# Patient Record
Sex: MALE | Race: WHITE | NOT HISPANIC OR LATINO | URBAN - METROPOLITAN AREA
[De-identification: names, ages, dates, MRNs, and addresses within clinical notes are randomized per-mention and may not be internally consistent; named-entity substitution may affect disease eponyms.]

---

## 2020-01-01 ENCOUNTER — INPATIENT (INPATIENT)
Facility: HOSPITAL | Age: 80
LOS: 17 days | End: 2020-08-22
Attending: INTERNAL MEDICINE | Admitting: INTERNAL MEDICINE
Payer: MEDICARE

## 2020-01-01 VITALS
RESPIRATION RATE: 40 BRPM | WEIGHT: 164.91 LBS | HEART RATE: 115 BPM | SYSTOLIC BLOOD PRESSURE: 128 MMHG | TEMPERATURE: 103 F | OXYGEN SATURATION: 97 % | DIASTOLIC BLOOD PRESSURE: 69 MMHG

## 2020-01-01 VITALS — OXYGEN SATURATION: 100 %

## 2020-01-01 DIAGNOSIS — Z95.0 PRESENCE OF CARDIAC PACEMAKER: Chronic | ICD-10-CM

## 2020-01-01 LAB
A1C WITH ESTIMATED AVERAGE GLUCOSE RESULT: 6.6 % — HIGH (ref 4–5.6)
ABO RH CONFIRMATION: SIGNIFICANT CHANGE UP
ALBUMIN SERPL ELPH-MCNC: 2.6 G/DL — LOW (ref 3.5–5.2)
ALBUMIN SERPL ELPH-MCNC: 2.7 G/DL — LOW (ref 3.5–5.2)
ALBUMIN SERPL ELPH-MCNC: 2.7 G/DL — LOW (ref 3.5–5.2)
ALBUMIN SERPL ELPH-MCNC: 2.8 G/DL — LOW (ref 3.5–5.2)
ALBUMIN SERPL ELPH-MCNC: 2.8 G/DL — LOW (ref 3.5–5.2)
ALBUMIN SERPL ELPH-MCNC: 2.9 G/DL — LOW (ref 3.5–5.2)
ALBUMIN SERPL ELPH-MCNC: 2.9 G/DL — LOW (ref 3.5–5.2)
ALBUMIN SERPL ELPH-MCNC: 3 G/DL — LOW (ref 3.5–5.2)
ALBUMIN SERPL ELPH-MCNC: 3.1 G/DL — LOW (ref 3.5–5.2)
ALBUMIN SERPL ELPH-MCNC: 3.2 G/DL — LOW (ref 3.5–5.2)
ALBUMIN SERPL ELPH-MCNC: 3.3 G/DL — LOW (ref 3.5–5.2)
ALBUMIN SERPL ELPH-MCNC: 3.4 G/DL — LOW (ref 3.5–5.2)
ALP SERPL-CCNC: 57 U/L — SIGNIFICANT CHANGE UP (ref 30–115)
ALP SERPL-CCNC: 60 U/L — SIGNIFICANT CHANGE UP (ref 30–115)
ALP SERPL-CCNC: 61 U/L — SIGNIFICANT CHANGE UP (ref 30–115)
ALP SERPL-CCNC: 62 U/L — SIGNIFICANT CHANGE UP (ref 30–115)
ALP SERPL-CCNC: 63 U/L — SIGNIFICANT CHANGE UP (ref 30–115)
ALP SERPL-CCNC: 65 U/L — SIGNIFICANT CHANGE UP (ref 30–115)
ALP SERPL-CCNC: 67 U/L — SIGNIFICANT CHANGE UP (ref 30–115)
ALP SERPL-CCNC: 68 U/L — SIGNIFICANT CHANGE UP (ref 30–115)
ALP SERPL-CCNC: 68 U/L — SIGNIFICANT CHANGE UP (ref 30–115)
ALP SERPL-CCNC: 71 U/L — SIGNIFICANT CHANGE UP (ref 30–115)
ALP SERPL-CCNC: 71 U/L — SIGNIFICANT CHANGE UP (ref 30–115)
ALP SERPL-CCNC: 72 U/L — SIGNIFICANT CHANGE UP (ref 30–115)
ALP SERPL-CCNC: 72 U/L — SIGNIFICANT CHANGE UP (ref 30–115)
ALP SERPL-CCNC: 73 U/L — SIGNIFICANT CHANGE UP (ref 30–115)
ALP SERPL-CCNC: 74 U/L — SIGNIFICANT CHANGE UP (ref 30–115)
ALP SERPL-CCNC: 74 U/L — SIGNIFICANT CHANGE UP (ref 30–115)
ALP SERPL-CCNC: 75 U/L — SIGNIFICANT CHANGE UP (ref 30–115)
ALP SERPL-CCNC: 76 U/L — SIGNIFICANT CHANGE UP (ref 30–115)
ALP SERPL-CCNC: 76 U/L — SIGNIFICANT CHANGE UP (ref 30–115)
ALP SERPL-CCNC: 78 U/L — SIGNIFICANT CHANGE UP (ref 30–115)
ALP SERPL-CCNC: 78 U/L — SIGNIFICANT CHANGE UP (ref 30–115)
ALP SERPL-CCNC: 79 U/L — SIGNIFICANT CHANGE UP (ref 30–115)
ALP SERPL-CCNC: 79 U/L — SIGNIFICANT CHANGE UP (ref 30–115)
ALP SERPL-CCNC: 80 U/L — SIGNIFICANT CHANGE UP (ref 30–115)
ALP SERPL-CCNC: 81 U/L — SIGNIFICANT CHANGE UP (ref 30–115)
ALP SERPL-CCNC: 89 U/L — SIGNIFICANT CHANGE UP (ref 30–115)
ALT FLD-CCNC: 16 U/L — SIGNIFICANT CHANGE UP (ref 0–41)
ALT FLD-CCNC: 18 U/L — SIGNIFICANT CHANGE UP (ref 0–41)
ALT FLD-CCNC: 19 U/L — SIGNIFICANT CHANGE UP (ref 0–41)
ALT FLD-CCNC: 21 U/L — SIGNIFICANT CHANGE UP (ref 0–41)
ALT FLD-CCNC: 23 U/L — SIGNIFICANT CHANGE UP (ref 0–41)
ALT FLD-CCNC: 25 U/L — SIGNIFICANT CHANGE UP (ref 0–41)
ALT FLD-CCNC: 28 U/L — SIGNIFICANT CHANGE UP (ref 0–41)
ALT FLD-CCNC: 31 U/L — SIGNIFICANT CHANGE UP (ref 0–41)
ALT FLD-CCNC: 31 U/L — SIGNIFICANT CHANGE UP (ref 0–41)
ALT FLD-CCNC: 32 U/L — SIGNIFICANT CHANGE UP (ref 0–41)
ALT FLD-CCNC: 32 U/L — SIGNIFICANT CHANGE UP (ref 0–41)
ALT FLD-CCNC: 33 U/L — SIGNIFICANT CHANGE UP (ref 0–41)
ALT FLD-CCNC: 34 U/L — SIGNIFICANT CHANGE UP (ref 0–41)
ALT FLD-CCNC: 38 U/L — SIGNIFICANT CHANGE UP (ref 0–41)
ALT FLD-CCNC: 38 U/L — SIGNIFICANT CHANGE UP (ref 0–41)
ALT FLD-CCNC: 41 U/L — SIGNIFICANT CHANGE UP (ref 0–41)
ALT FLD-CCNC: 41 U/L — SIGNIFICANT CHANGE UP (ref 0–41)
ALT FLD-CCNC: 42 U/L — HIGH (ref 0–41)
ALT FLD-CCNC: 43 U/L — HIGH (ref 0–41)
ALT FLD-CCNC: 45 U/L — HIGH (ref 0–41)
ALT FLD-CCNC: 47 U/L — HIGH (ref 0–41)
ALT FLD-CCNC: 49 U/L — HIGH (ref 0–41)
ALT FLD-CCNC: 4935 U/L — HIGH (ref 0–41)
ALT FLD-CCNC: 50 U/L — HIGH (ref 0–41)
ALT FLD-CCNC: 50 U/L — HIGH (ref 0–41)
ALT FLD-CCNC: 51 U/L — HIGH (ref 0–41)
ANION GAP SERPL CALC-SCNC: 10 MMOL/L — SIGNIFICANT CHANGE UP (ref 7–14)
ANION GAP SERPL CALC-SCNC: 11 MMOL/L — SIGNIFICANT CHANGE UP (ref 7–14)
ANION GAP SERPL CALC-SCNC: 12 MMOL/L — SIGNIFICANT CHANGE UP (ref 7–14)
ANION GAP SERPL CALC-SCNC: 13 MMOL/L — SIGNIFICANT CHANGE UP (ref 7–14)
ANION GAP SERPL CALC-SCNC: 14 MMOL/L — SIGNIFICANT CHANGE UP (ref 7–14)
ANION GAP SERPL CALC-SCNC: 16 MMOL/L — HIGH (ref 7–14)
ANION GAP SERPL CALC-SCNC: 27 MMOL/L — HIGH (ref 7–14)
ANION GAP SERPL CALC-SCNC: 4 MMOL/L — LOW (ref 7–14)
ANION GAP SERPL CALC-SCNC: 6 MMOL/L — LOW (ref 7–14)
ANION GAP SERPL CALC-SCNC: 8 MMOL/L — SIGNIFICANT CHANGE UP (ref 7–14)
ANISOCYTOSIS BLD QL: SLIGHT — SIGNIFICANT CHANGE UP
APTT BLD: 36.8 SEC — SIGNIFICANT CHANGE UP (ref 27–39.2)
APTT BLD: 47.6 SEC — HIGH (ref 27–39.2)
AST SERPL-CCNC: 14 U/L — SIGNIFICANT CHANGE UP (ref 0–41)
AST SERPL-CCNC: 15 U/L — SIGNIFICANT CHANGE UP (ref 0–41)
AST SERPL-CCNC: 16 U/L — SIGNIFICANT CHANGE UP (ref 0–41)
AST SERPL-CCNC: 19 U/L — SIGNIFICANT CHANGE UP (ref 0–41)
AST SERPL-CCNC: 20 U/L — SIGNIFICANT CHANGE UP (ref 0–41)
AST SERPL-CCNC: 21 U/L — SIGNIFICANT CHANGE UP (ref 0–41)
AST SERPL-CCNC: 21 U/L — SIGNIFICANT CHANGE UP (ref 0–41)
AST SERPL-CCNC: 24 U/L — SIGNIFICANT CHANGE UP (ref 0–41)
AST SERPL-CCNC: 25 U/L — SIGNIFICANT CHANGE UP (ref 0–41)
AST SERPL-CCNC: 27 U/L — SIGNIFICANT CHANGE UP (ref 0–41)
AST SERPL-CCNC: 28 U/L — SIGNIFICANT CHANGE UP (ref 0–41)
AST SERPL-CCNC: 29 U/L — SIGNIFICANT CHANGE UP (ref 0–41)
AST SERPL-CCNC: 31 U/L — SIGNIFICANT CHANGE UP (ref 0–41)
AST SERPL-CCNC: 33 U/L — SIGNIFICANT CHANGE UP (ref 0–41)
AST SERPL-CCNC: 34 U/L — SIGNIFICANT CHANGE UP (ref 0–41)
AST SERPL-CCNC: 3556 U/L — HIGH (ref 0–41)
AST SERPL-CCNC: 49 U/L — HIGH (ref 0–41)
AST SERPL-CCNC: 49 U/L — HIGH (ref 0–41)
AST SERPL-CCNC: 51 U/L — HIGH (ref 0–41)
AST SERPL-CCNC: 54 U/L — HIGH (ref 0–41)
AST SERPL-CCNC: 54 U/L — HIGH (ref 0–41)
BASOPHILS # BLD AUTO: 0.02 K/UL — SIGNIFICANT CHANGE UP (ref 0–0.2)
BASOPHILS # BLD AUTO: 0.03 K/UL — SIGNIFICANT CHANGE UP (ref 0–0.2)
BASOPHILS # BLD AUTO: 0.04 K/UL — SIGNIFICANT CHANGE UP (ref 0–0.2)
BASOPHILS # BLD AUTO: 0.04 K/UL — SIGNIFICANT CHANGE UP (ref 0–0.2)
BASOPHILS # BLD AUTO: 0.08 K/UL — SIGNIFICANT CHANGE UP (ref 0–0.2)
BASOPHILS # BLD AUTO: 0.2 K/UL — SIGNIFICANT CHANGE UP (ref 0–0.2)
BASOPHILS # BLD AUTO: 0.31 K/UL — HIGH (ref 0–0.2)
BASOPHILS NFR BLD AUTO: 0.1 % — SIGNIFICANT CHANGE UP (ref 0–1)
BASOPHILS NFR BLD AUTO: 0.1 % — SIGNIFICANT CHANGE UP (ref 0–1)
BASOPHILS NFR BLD AUTO: 0.2 % — SIGNIFICANT CHANGE UP (ref 0–1)
BASOPHILS NFR BLD AUTO: 0.3 % — SIGNIFICANT CHANGE UP (ref 0–1)
BASOPHILS NFR BLD AUTO: 0.3 % — SIGNIFICANT CHANGE UP (ref 0–1)
BASOPHILS NFR BLD AUTO: 0.5 % — SIGNIFICANT CHANGE UP (ref 0–1)
BASOPHILS NFR BLD AUTO: 0.9 % — SIGNIFICANT CHANGE UP (ref 0–1)
BASOPHILS NFR BLD AUTO: 1.7 % — HIGH (ref 0–1)
BILIRUB DIRECT SERPL-MCNC: 0.2 MG/DL — SIGNIFICANT CHANGE UP (ref 0–0.2)
BILIRUB INDIRECT FLD-MCNC: 0.1 MG/DL — LOW (ref 0.2–1.2)
BILIRUB INDIRECT FLD-MCNC: 0.2 MG/DL — SIGNIFICANT CHANGE UP (ref 0.2–1.2)
BILIRUB INDIRECT FLD-MCNC: 0.2 MG/DL — SIGNIFICANT CHANGE UP (ref 0.2–1.2)
BILIRUB INDIRECT FLD-MCNC: 0.3 MG/DL — SIGNIFICANT CHANGE UP (ref 0.2–1.2)
BILIRUB INDIRECT FLD-MCNC: 0.4 MG/DL — SIGNIFICANT CHANGE UP (ref 0.2–1.2)
BILIRUB INDIRECT FLD-MCNC: 0.6 MG/DL — SIGNIFICANT CHANGE UP (ref 0.2–1.2)
BILIRUB INDIRECT FLD-MCNC: 0.6 MG/DL — SIGNIFICANT CHANGE UP (ref 0.2–1.2)
BILIRUB SERPL-MCNC: 0.3 MG/DL — SIGNIFICANT CHANGE UP (ref 0.2–1.2)
BILIRUB SERPL-MCNC: 0.4 MG/DL — SIGNIFICANT CHANGE UP (ref 0.2–1.2)
BILIRUB SERPL-MCNC: 0.5 MG/DL — SIGNIFICANT CHANGE UP (ref 0.2–1.2)
BILIRUB SERPL-MCNC: 0.6 MG/DL — SIGNIFICANT CHANGE UP (ref 0.2–1.2)
BILIRUB SERPL-MCNC: 0.7 MG/DL — SIGNIFICANT CHANGE UP (ref 0.2–1.2)
BILIRUB SERPL-MCNC: 0.7 MG/DL — SIGNIFICANT CHANGE UP (ref 0.2–1.2)
BILIRUB SERPL-MCNC: 0.8 MG/DL — SIGNIFICANT CHANGE UP (ref 0.2–1.2)
BILIRUB SERPL-MCNC: 0.9 MG/DL — SIGNIFICANT CHANGE UP (ref 0.2–1.2)
BILIRUB SERPL-MCNC: 1 MG/DL — SIGNIFICANT CHANGE UP (ref 0.2–1.2)
BILIRUB SERPL-MCNC: 1 MG/DL — SIGNIFICANT CHANGE UP (ref 0.2–1.2)
BILIRUB SERPL-MCNC: 1.1 MG/DL — SIGNIFICANT CHANGE UP (ref 0.2–1.2)
BILIRUB SERPL-MCNC: 1.3 MG/DL — HIGH (ref 0.2–1.2)
BLD GP AB SCN SERPL QL: SIGNIFICANT CHANGE UP
BUN SERPL-MCNC: 14 MG/DL — SIGNIFICANT CHANGE UP (ref 10–20)
BUN SERPL-MCNC: 15 MG/DL — SIGNIFICANT CHANGE UP (ref 10–20)
BUN SERPL-MCNC: 18 MG/DL — SIGNIFICANT CHANGE UP (ref 10–20)
BUN SERPL-MCNC: 21 MG/DL — HIGH (ref 10–20)
BUN SERPL-MCNC: 21 MG/DL — HIGH (ref 10–20)
BUN SERPL-MCNC: 22 MG/DL — HIGH (ref 10–20)
BUN SERPL-MCNC: 23 MG/DL — HIGH (ref 10–20)
BUN SERPL-MCNC: 24 MG/DL — HIGH (ref 10–20)
BUN SERPL-MCNC: 25 MG/DL — HIGH (ref 10–20)
BUN SERPL-MCNC: 28 MG/DL — HIGH (ref 10–20)
BUN SERPL-MCNC: 31 MG/DL — HIGH (ref 10–20)
BUN SERPL-MCNC: 32 MG/DL — HIGH (ref 10–20)
BURR CELLS BLD QL SMEAR: PRESENT — SIGNIFICANT CHANGE UP
BURR CELLS BLD QL SMEAR: PRESENT — SIGNIFICANT CHANGE UP
CALCIUM SERPL-MCNC: 7.7 MG/DL — LOW (ref 8.5–10.1)
CALCIUM SERPL-MCNC: 8.2 MG/DL — LOW (ref 8.5–10.1)
CALCIUM SERPL-MCNC: 8.4 MG/DL — LOW (ref 8.5–10.1)
CALCIUM SERPL-MCNC: 8.5 MG/DL — SIGNIFICANT CHANGE UP (ref 8.5–10.1)
CALCIUM SERPL-MCNC: 8.6 MG/DL — SIGNIFICANT CHANGE UP (ref 8.5–10.1)
CALCIUM SERPL-MCNC: 8.7 MG/DL — SIGNIFICANT CHANGE UP (ref 8.5–10.1)
CALCIUM SERPL-MCNC: 8.8 MG/DL — SIGNIFICANT CHANGE UP (ref 8.5–10.1)
CHLORIDE SERPL-SCNC: 101 MMOL/L — SIGNIFICANT CHANGE UP (ref 98–110)
CHLORIDE SERPL-SCNC: 102 MMOL/L — SIGNIFICANT CHANGE UP (ref 98–110)
CHLORIDE SERPL-SCNC: 104 MMOL/L — SIGNIFICANT CHANGE UP (ref 98–110)
CHLORIDE SERPL-SCNC: 104 MMOL/L — SIGNIFICANT CHANGE UP (ref 98–110)
CHLORIDE SERPL-SCNC: 105 MMOL/L — SIGNIFICANT CHANGE UP (ref 98–110)
CHLORIDE SERPL-SCNC: 106 MMOL/L — SIGNIFICANT CHANGE UP (ref 98–110)
CHLORIDE SERPL-SCNC: 106 MMOL/L — SIGNIFICANT CHANGE UP (ref 98–110)
CHLORIDE SERPL-SCNC: 107 MMOL/L — SIGNIFICANT CHANGE UP (ref 98–110)
CHLORIDE SERPL-SCNC: 108 MMOL/L — SIGNIFICANT CHANGE UP (ref 98–110)
CHLORIDE SERPL-SCNC: 108 MMOL/L — SIGNIFICANT CHANGE UP (ref 98–110)
CHLORIDE SERPL-SCNC: 110 MMOL/L — SIGNIFICANT CHANGE UP (ref 98–110)
CHLORIDE SERPL-SCNC: 111 MMOL/L — HIGH (ref 98–110)
CHLORIDE SERPL-SCNC: 92 MMOL/L — LOW (ref 98–110)
CHLORIDE SERPL-SCNC: 94 MMOL/L — LOW (ref 98–110)
CHLORIDE SERPL-SCNC: 98 MMOL/L — SIGNIFICANT CHANGE UP (ref 98–110)
CHLORIDE SERPL-SCNC: 99 MMOL/L — SIGNIFICANT CHANGE UP (ref 98–110)
CHOLEST SERPL-MCNC: 149 MG/DL — SIGNIFICANT CHANGE UP (ref 100–200)
CO2 SERPL-SCNC: 11 MMOL/L — LOW (ref 17–32)
CO2 SERPL-SCNC: 17 MMOL/L — SIGNIFICANT CHANGE UP (ref 17–32)
CO2 SERPL-SCNC: 18 MMOL/L — SIGNIFICANT CHANGE UP (ref 17–32)
CO2 SERPL-SCNC: 19 MMOL/L — SIGNIFICANT CHANGE UP (ref 17–32)
CO2 SERPL-SCNC: 19 MMOL/L — SIGNIFICANT CHANGE UP (ref 17–32)
CO2 SERPL-SCNC: 20 MMOL/L — SIGNIFICANT CHANGE UP (ref 17–32)
CO2 SERPL-SCNC: 20 MMOL/L — SIGNIFICANT CHANGE UP (ref 17–32)
CO2 SERPL-SCNC: 21 MMOL/L — SIGNIFICANT CHANGE UP (ref 17–32)
CO2 SERPL-SCNC: 21 MMOL/L — SIGNIFICANT CHANGE UP (ref 17–32)
CO2 SERPL-SCNC: 22 MMOL/L — SIGNIFICANT CHANGE UP (ref 17–32)
CO2 SERPL-SCNC: 23 MMOL/L — SIGNIFICANT CHANGE UP (ref 17–32)
CO2 SERPL-SCNC: 25 MMOL/L — SIGNIFICANT CHANGE UP (ref 17–32)
CO2 SERPL-SCNC: 27 MMOL/L — SIGNIFICANT CHANGE UP (ref 17–32)
CO2 SERPL-SCNC: 28 MMOL/L — SIGNIFICANT CHANGE UP (ref 17–32)
CO2 SERPL-SCNC: 29 MMOL/L — SIGNIFICANT CHANGE UP (ref 17–32)
CREAT SERPL-MCNC: 0.8 MG/DL — SIGNIFICANT CHANGE UP (ref 0.7–1.5)
CREAT SERPL-MCNC: 0.9 MG/DL — SIGNIFICANT CHANGE UP (ref 0.7–1.5)
CREAT SERPL-MCNC: 1 MG/DL — SIGNIFICANT CHANGE UP (ref 0.7–1.5)
CREAT SERPL-MCNC: 1.1 MG/DL — SIGNIFICANT CHANGE UP (ref 0.7–1.5)
CREAT SERPL-MCNC: 2.4 MG/DL — HIGH (ref 0.7–1.5)
CRP SERPL-MCNC: 0.11 MG/DL — SIGNIFICANT CHANGE UP (ref 0–0.4)
CRP SERPL-MCNC: 0.31 MG/DL — SIGNIFICANT CHANGE UP (ref 0–0.4)
CRP SERPL-MCNC: 0.4 MG/DL — SIGNIFICANT CHANGE UP (ref 0–0.4)
CRP SERPL-MCNC: 0.93 MG/DL — HIGH (ref 0–0.4)
CRP SERPL-MCNC: 19.62 MG/DL — HIGH (ref 0–0.4)
CRP SERPL-MCNC: 21.96 MG/DL — HIGH (ref 0–0.4)
CRP SERPL-MCNC: 23.41 MG/DL — HIGH (ref 0–0.4)
CULTURE RESULTS: SIGNIFICANT CHANGE UP
CULTURE RESULTS: SIGNIFICANT CHANGE UP
D DIMER BLD IA.RAPID-MCNC: 1132 NG/ML DDU — HIGH (ref 0–230)
D DIMER BLD IA.RAPID-MCNC: 1960 NG/ML DDU — HIGH (ref 0–230)
D DIMER BLD IA.RAPID-MCNC: 3614 NG/ML DDU — HIGH (ref 0–230)
D DIMER BLD IA.RAPID-MCNC: 374 NG/ML DDU — HIGH (ref 0–230)
D DIMER BLD IA.RAPID-MCNC: 414 NG/ML DDU — HIGH (ref 0–230)
D DIMER BLD IA.RAPID-MCNC: 435 NG/ML DDU — HIGH (ref 0–230)
D DIMER BLD IA.RAPID-MCNC: HIGH NG/ML DDU (ref 0–230)
EOSINOPHIL # BLD AUTO: 0 K/UL — SIGNIFICANT CHANGE UP (ref 0–0.7)
EOSINOPHIL # BLD AUTO: 0.04 K/UL — SIGNIFICANT CHANGE UP (ref 0–0.7)
EOSINOPHIL # BLD AUTO: 0.08 K/UL — SIGNIFICANT CHANGE UP (ref 0–0.7)
EOSINOPHIL # BLD AUTO: 0.09 K/UL — SIGNIFICANT CHANGE UP (ref 0–0.7)
EOSINOPHIL # BLD AUTO: 0.1 K/UL — SIGNIFICANT CHANGE UP (ref 0–0.7)
EOSINOPHIL # BLD AUTO: 0.13 K/UL — SIGNIFICANT CHANGE UP (ref 0–0.7)
EOSINOPHIL # BLD AUTO: 0.2 K/UL — SIGNIFICANT CHANGE UP (ref 0–0.7)
EOSINOPHIL # BLD AUTO: 0.22 K/UL — SIGNIFICANT CHANGE UP (ref 0–0.7)
EOSINOPHIL # BLD AUTO: 0.39 K/UL — SIGNIFICANT CHANGE UP (ref 0–0.7)
EOSINOPHIL # BLD AUTO: 0.46 K/UL — SIGNIFICANT CHANGE UP (ref 0–0.7)
EOSINOPHIL NFR BLD AUTO: 0 % — SIGNIFICANT CHANGE UP (ref 0–8)
EOSINOPHIL NFR BLD AUTO: 0.2 % — SIGNIFICANT CHANGE UP (ref 0–8)
EOSINOPHIL NFR BLD AUTO: 0.6 % — SIGNIFICANT CHANGE UP (ref 0–8)
EOSINOPHIL NFR BLD AUTO: 0.7 % — SIGNIFICANT CHANGE UP (ref 0–8)
EOSINOPHIL NFR BLD AUTO: 0.8 % — SIGNIFICANT CHANGE UP (ref 0–8)
EOSINOPHIL NFR BLD AUTO: 0.8 % — SIGNIFICANT CHANGE UP (ref 0–8)
EOSINOPHIL NFR BLD AUTO: 1.1 % — SIGNIFICANT CHANGE UP (ref 0–8)
EOSINOPHIL NFR BLD AUTO: 1.2 % — SIGNIFICANT CHANGE UP (ref 0–8)
EOSINOPHIL NFR BLD AUTO: 3.6 % — SIGNIFICANT CHANGE UP (ref 0–8)
EOSINOPHIL NFR BLD AUTO: 3.9 % — SIGNIFICANT CHANGE UP (ref 0–8)
ERYTHROCYTE [SEDIMENTATION RATE] IN BLOOD: 58 MM/HR — HIGH (ref 0–10)
ERYTHROCYTE [SEDIMENTATION RATE] IN BLOOD: 7 MM/HR — SIGNIFICANT CHANGE UP (ref 0–10)
ESTIMATED AVERAGE GLUCOSE: 143 MG/DL — HIGH (ref 68–114)
FERRITIN SERPL-MCNC: 1318 NG/ML — HIGH (ref 30–400)
FERRITIN SERPL-MCNC: 1570 NG/ML — HIGH (ref 30–400)
FERRITIN SERPL-MCNC: 1625 NG/ML — HIGH (ref 30–400)
FERRITIN SERPL-MCNC: 1716 NG/ML — HIGH (ref 30–400)
FIBRINOGEN PPP-MCNC: 216 MG/DL — SIGNIFICANT CHANGE UP (ref 204.4–570.6)
FIBRINOGEN PPP-MCNC: 328 MG/DL — SIGNIFICANT CHANGE UP (ref 204.4–570.6)
FIBRINOGEN PPP-MCNC: 339 MG/DL — SIGNIFICANT CHANGE UP (ref 204.4–570.6)
FIBRINOGEN PPP-MCNC: 345 MG/DL — SIGNIFICANT CHANGE UP (ref 204.4–570.6)
FIBRINOGEN PPP-MCNC: 691 MG/DL — HIGH (ref 204.4–570.6)
GAS PNL BLDA: SIGNIFICANT CHANGE UP
GIANT PLATELETS BLD QL SMEAR: PRESENT — SIGNIFICANT CHANGE UP
GIANT PLATELETS BLD QL SMEAR: PRESENT — SIGNIFICANT CHANGE UP
GLUCOSE BLDC GLUCOMTR-MCNC: 103 MG/DL — HIGH (ref 70–99)
GLUCOSE BLDC GLUCOMTR-MCNC: 105 MG/DL — HIGH (ref 70–99)
GLUCOSE BLDC GLUCOMTR-MCNC: 105 MG/DL — HIGH (ref 70–99)
GLUCOSE BLDC GLUCOMTR-MCNC: 106 MG/DL — HIGH (ref 70–99)
GLUCOSE BLDC GLUCOMTR-MCNC: 111 MG/DL — HIGH (ref 70–99)
GLUCOSE BLDC GLUCOMTR-MCNC: 111 MG/DL — HIGH (ref 70–99)
GLUCOSE BLDC GLUCOMTR-MCNC: 114 MG/DL — HIGH (ref 70–99)
GLUCOSE BLDC GLUCOMTR-MCNC: 115 MG/DL — HIGH (ref 70–99)
GLUCOSE BLDC GLUCOMTR-MCNC: 115 MG/DL — HIGH (ref 70–99)
GLUCOSE BLDC GLUCOMTR-MCNC: 116 MG/DL — HIGH (ref 70–99)
GLUCOSE BLDC GLUCOMTR-MCNC: 117 MG/DL — HIGH (ref 70–99)
GLUCOSE BLDC GLUCOMTR-MCNC: 117 MG/DL — HIGH (ref 70–99)
GLUCOSE BLDC GLUCOMTR-MCNC: 120 MG/DL — HIGH (ref 70–99)
GLUCOSE BLDC GLUCOMTR-MCNC: 121 MG/DL — HIGH (ref 70–99)
GLUCOSE BLDC GLUCOMTR-MCNC: 124 MG/DL — HIGH (ref 70–99)
GLUCOSE BLDC GLUCOMTR-MCNC: 124 MG/DL — HIGH (ref 70–99)
GLUCOSE BLDC GLUCOMTR-MCNC: 125 MG/DL — HIGH (ref 70–99)
GLUCOSE BLDC GLUCOMTR-MCNC: 127 MG/DL — HIGH (ref 70–99)
GLUCOSE BLDC GLUCOMTR-MCNC: 129 MG/DL — HIGH (ref 70–99)
GLUCOSE BLDC GLUCOMTR-MCNC: 132 MG/DL — HIGH (ref 70–99)
GLUCOSE BLDC GLUCOMTR-MCNC: 133 MG/DL — HIGH (ref 70–99)
GLUCOSE BLDC GLUCOMTR-MCNC: 135 MG/DL — HIGH (ref 70–99)
GLUCOSE BLDC GLUCOMTR-MCNC: 139 MG/DL — HIGH (ref 70–99)
GLUCOSE BLDC GLUCOMTR-MCNC: 139 MG/DL — HIGH (ref 70–99)
GLUCOSE BLDC GLUCOMTR-MCNC: 140 MG/DL — HIGH (ref 70–99)
GLUCOSE BLDC GLUCOMTR-MCNC: 140 MG/DL — HIGH (ref 70–99)
GLUCOSE BLDC GLUCOMTR-MCNC: 151 MG/DL — HIGH (ref 70–99)
GLUCOSE BLDC GLUCOMTR-MCNC: 152 MG/DL — HIGH (ref 70–99)
GLUCOSE BLDC GLUCOMTR-MCNC: 155 MG/DL — HIGH (ref 70–99)
GLUCOSE BLDC GLUCOMTR-MCNC: 159 MG/DL — HIGH (ref 70–99)
GLUCOSE BLDC GLUCOMTR-MCNC: 161 MG/DL — HIGH (ref 70–99)
GLUCOSE BLDC GLUCOMTR-MCNC: 162 MG/DL — HIGH (ref 70–99)
GLUCOSE BLDC GLUCOMTR-MCNC: 162 MG/DL — HIGH (ref 70–99)
GLUCOSE BLDC GLUCOMTR-MCNC: 164 MG/DL — HIGH (ref 70–99)
GLUCOSE BLDC GLUCOMTR-MCNC: 166 MG/DL — HIGH (ref 70–99)
GLUCOSE BLDC GLUCOMTR-MCNC: 167 MG/DL — HIGH (ref 70–99)
GLUCOSE BLDC GLUCOMTR-MCNC: 168 MG/DL — HIGH (ref 70–99)
GLUCOSE BLDC GLUCOMTR-MCNC: 172 MG/DL — HIGH (ref 70–99)
GLUCOSE BLDC GLUCOMTR-MCNC: 177 MG/DL — HIGH (ref 70–99)
GLUCOSE BLDC GLUCOMTR-MCNC: 178 MG/DL — HIGH (ref 70–99)
GLUCOSE BLDC GLUCOMTR-MCNC: 179 MG/DL — HIGH (ref 70–99)
GLUCOSE BLDC GLUCOMTR-MCNC: 184 MG/DL — HIGH (ref 70–99)
GLUCOSE BLDC GLUCOMTR-MCNC: 185 MG/DL — HIGH (ref 70–99)
GLUCOSE BLDC GLUCOMTR-MCNC: 187 MG/DL — HIGH (ref 70–99)
GLUCOSE BLDC GLUCOMTR-MCNC: 189 MG/DL — HIGH (ref 70–99)
GLUCOSE BLDC GLUCOMTR-MCNC: 191 MG/DL — HIGH (ref 70–99)
GLUCOSE BLDC GLUCOMTR-MCNC: 193 MG/DL — HIGH (ref 70–99)
GLUCOSE BLDC GLUCOMTR-MCNC: 200 MG/DL — HIGH (ref 70–99)
GLUCOSE BLDC GLUCOMTR-MCNC: 204 MG/DL — HIGH (ref 70–99)
GLUCOSE BLDC GLUCOMTR-MCNC: 206 MG/DL — HIGH (ref 70–99)
GLUCOSE BLDC GLUCOMTR-MCNC: 206 MG/DL — HIGH (ref 70–99)
GLUCOSE BLDC GLUCOMTR-MCNC: 208 MG/DL — HIGH (ref 70–99)
GLUCOSE BLDC GLUCOMTR-MCNC: 210 MG/DL — HIGH (ref 70–99)
GLUCOSE BLDC GLUCOMTR-MCNC: 215 MG/DL — HIGH (ref 70–99)
GLUCOSE BLDC GLUCOMTR-MCNC: 215 MG/DL — HIGH (ref 70–99)
GLUCOSE BLDC GLUCOMTR-MCNC: 219 MG/DL — HIGH (ref 70–99)
GLUCOSE BLDC GLUCOMTR-MCNC: 226 MG/DL — HIGH (ref 70–99)
GLUCOSE BLDC GLUCOMTR-MCNC: 229 MG/DL — HIGH (ref 70–99)
GLUCOSE BLDC GLUCOMTR-MCNC: 241 MG/DL — HIGH (ref 70–99)
GLUCOSE BLDC GLUCOMTR-MCNC: 244 MG/DL — HIGH (ref 70–99)
GLUCOSE BLDC GLUCOMTR-MCNC: 258 MG/DL — HIGH (ref 70–99)
GLUCOSE BLDC GLUCOMTR-MCNC: 287 MG/DL — HIGH (ref 70–99)
GLUCOSE BLDC GLUCOMTR-MCNC: 378 MG/DL — HIGH (ref 70–99)
GLUCOSE BLDC GLUCOMTR-MCNC: 82 MG/DL — SIGNIFICANT CHANGE UP (ref 70–99)
GLUCOSE SERPL-MCNC: 109 MG/DL — HIGH (ref 70–99)
GLUCOSE SERPL-MCNC: 113 MG/DL — HIGH (ref 70–99)
GLUCOSE SERPL-MCNC: 115 MG/DL — HIGH (ref 70–99)
GLUCOSE SERPL-MCNC: 133 MG/DL — HIGH (ref 70–99)
GLUCOSE SERPL-MCNC: 137 MG/DL — HIGH (ref 70–99)
GLUCOSE SERPL-MCNC: 139 MG/DL — HIGH (ref 70–99)
GLUCOSE SERPL-MCNC: 143 MG/DL — HIGH (ref 70–99)
GLUCOSE SERPL-MCNC: 143 MG/DL — HIGH (ref 70–99)
GLUCOSE SERPL-MCNC: 150 MG/DL — HIGH (ref 70–99)
GLUCOSE SERPL-MCNC: 154 MG/DL — HIGH (ref 70–99)
GLUCOSE SERPL-MCNC: 157 MG/DL — HIGH (ref 70–99)
GLUCOSE SERPL-MCNC: 161 MG/DL — HIGH (ref 70–99)
GLUCOSE SERPL-MCNC: 165 MG/DL — HIGH (ref 70–99)
GLUCOSE SERPL-MCNC: 186 MG/DL — HIGH (ref 70–99)
GLUCOSE SERPL-MCNC: 186 MG/DL — HIGH (ref 70–99)
GLUCOSE SERPL-MCNC: 188 MG/DL — HIGH (ref 70–99)
GLUCOSE SERPL-MCNC: 194 MG/DL — HIGH (ref 70–99)
GLUCOSE SERPL-MCNC: 223 MG/DL — HIGH (ref 70–99)
GLUCOSE SERPL-MCNC: 224 MG/DL — HIGH (ref 70–99)
GLUCOSE SERPL-MCNC: 232 MG/DL — HIGH (ref 70–99)
HCT VFR BLD CALC: 24.6 % — LOW (ref 42–52)
HCT VFR BLD CALC: 34.2 % — LOW (ref 42–52)
HCT VFR BLD CALC: 38 % — LOW (ref 42–52)
HCT VFR BLD CALC: 41.6 % — LOW (ref 42–52)
HCT VFR BLD CALC: 41.8 % — LOW (ref 42–52)
HCT VFR BLD CALC: 42.2 % — SIGNIFICANT CHANGE UP (ref 42–52)
HCT VFR BLD CALC: 42.6 % — SIGNIFICANT CHANGE UP (ref 42–52)
HCT VFR BLD CALC: 42.8 % — SIGNIFICANT CHANGE UP (ref 42–52)
HCT VFR BLD CALC: 43.1 % — SIGNIFICANT CHANGE UP (ref 42–52)
HCT VFR BLD CALC: 43.2 % — SIGNIFICANT CHANGE UP (ref 42–52)
HCT VFR BLD CALC: 43.5 % — SIGNIFICANT CHANGE UP (ref 42–52)
HCT VFR BLD CALC: 43.9 % — SIGNIFICANT CHANGE UP (ref 42–52)
HCT VFR BLD CALC: 45.4 % — SIGNIFICANT CHANGE UP (ref 42–52)
HCT VFR BLD CALC: 46 % — SIGNIFICANT CHANGE UP (ref 42–52)
HCT VFR BLD CALC: 46.5 % — SIGNIFICANT CHANGE UP (ref 42–52)
HCT VFR BLD CALC: 47.5 % — SIGNIFICANT CHANGE UP (ref 42–52)
HDLC SERPL-MCNC: 24 MG/DL — LOW
HGB BLD-MCNC: 11.6 G/DL — LOW (ref 14–18)
HGB BLD-MCNC: 12.5 G/DL — LOW (ref 14–18)
HGB BLD-MCNC: 13.3 G/DL — LOW (ref 14–18)
HGB BLD-MCNC: 13.5 G/DL — LOW (ref 14–18)
HGB BLD-MCNC: 13.9 G/DL — LOW (ref 14–18)
HGB BLD-MCNC: 14 G/DL — SIGNIFICANT CHANGE UP (ref 14–18)
HGB BLD-MCNC: 14.1 G/DL — SIGNIFICANT CHANGE UP (ref 14–18)
HGB BLD-MCNC: 14.3 G/DL — SIGNIFICANT CHANGE UP (ref 14–18)
HGB BLD-MCNC: 14.3 G/DL — SIGNIFICANT CHANGE UP (ref 14–18)
HGB BLD-MCNC: 14.7 G/DL — SIGNIFICANT CHANGE UP (ref 14–18)
HGB BLD-MCNC: 14.9 G/DL — SIGNIFICANT CHANGE UP (ref 14–18)
HGB BLD-MCNC: 15.1 G/DL — SIGNIFICANT CHANGE UP (ref 14–18)
HGB BLD-MCNC: 15.1 G/DL — SIGNIFICANT CHANGE UP (ref 14–18)
HGB BLD-MCNC: 15.2 G/DL — SIGNIFICANT CHANGE UP (ref 14–18)
HGB BLD-MCNC: 15.5 G/DL — SIGNIFICANT CHANGE UP (ref 14–18)
HGB BLD-MCNC: 8 G/DL — LOW (ref 14–18)
IMM GRANULOCYTES NFR BLD AUTO: 0.5 % — HIGH (ref 0.1–0.3)
IMM GRANULOCYTES NFR BLD AUTO: 0.6 % — HIGH (ref 0.1–0.3)
IMM GRANULOCYTES NFR BLD AUTO: 0.7 % — HIGH (ref 0.1–0.3)
IMM GRANULOCYTES NFR BLD AUTO: 0.8 % — HIGH (ref 0.1–0.3)
IMM GRANULOCYTES NFR BLD AUTO: 0.8 % — HIGH (ref 0.1–0.3)
IMM GRANULOCYTES NFR BLD AUTO: 1 % — HIGH (ref 0.1–0.3)
IMM GRANULOCYTES NFR BLD AUTO: 1.1 % — HIGH (ref 0.1–0.3)
IMM GRANULOCYTES NFR BLD AUTO: 1.5 % — HIGH (ref 0.1–0.3)
IMM GRANULOCYTES NFR BLD AUTO: 1.5 % — HIGH (ref 0.1–0.3)
IMM GRANULOCYTES NFR BLD AUTO: 1.8 % — HIGH (ref 0.1–0.3)
IMM GRANULOCYTES NFR BLD AUTO: 3.3 % — HIGH (ref 0.1–0.3)
INR BLD: 1.53 RATIO — HIGH (ref 0.65–1.3)
INR BLD: 1.86 RATIO — HIGH (ref 0.65–1.3)
LACTATE SERPL-SCNC: 17.3 MMOL/L — CRITICAL HIGH (ref 0.7–2)
LACTATE SERPL-SCNC: 2.2 MMOL/L — HIGH (ref 0.7–2)
LDH SERPL L TO P-CCNC: 337 U/L — HIGH (ref 50–242)
LDH SERPL L TO P-CCNC: 367 U/L — HIGH (ref 50–242)
LDH SERPL L TO P-CCNC: 416 U/L — HIGH (ref 50–242)
LEGIONELLA AG UR QL: NEGATIVE — SIGNIFICANT CHANGE UP
LIPID PNL WITH DIRECT LDL SERPL: 88 MG/DL — SIGNIFICANT CHANGE UP (ref 4–129)
LYMPHOCYTES # BLD AUTO: 0.74 K/UL — LOW (ref 1.2–3.4)
LYMPHOCYTES # BLD AUTO: 1.06 K/UL — LOW (ref 1.2–3.4)
LYMPHOCYTES # BLD AUTO: 1.07 K/UL — LOW (ref 1.2–3.4)
LYMPHOCYTES # BLD AUTO: 1.14 K/UL — LOW (ref 1.2–3.4)
LYMPHOCYTES # BLD AUTO: 1.16 K/UL — LOW (ref 1.2–3.4)
LYMPHOCYTES # BLD AUTO: 1.17 K/UL — LOW (ref 1.2–3.4)
LYMPHOCYTES # BLD AUTO: 1.17 K/UL — LOW (ref 1.2–3.4)
LYMPHOCYTES # BLD AUTO: 1.22 K/UL — SIGNIFICANT CHANGE UP (ref 1.2–3.4)
LYMPHOCYTES # BLD AUTO: 1.23 K/UL — SIGNIFICANT CHANGE UP (ref 1.2–3.4)
LYMPHOCYTES # BLD AUTO: 1.38 K/UL — SIGNIFICANT CHANGE UP (ref 1.2–3.4)
LYMPHOCYTES # BLD AUTO: 1.44 K/UL — SIGNIFICANT CHANGE UP (ref 1.2–3.4)
LYMPHOCYTES # BLD AUTO: 10.5 % — LOW (ref 20.5–51.1)
LYMPHOCYTES # BLD AUTO: 10.8 % — LOW (ref 20.5–51.1)
LYMPHOCYTES # BLD AUTO: 10.8 % — LOW (ref 20.5–51.1)
LYMPHOCYTES # BLD AUTO: 14.8 % — LOW (ref 20.5–51.1)
LYMPHOCYTES # BLD AUTO: 2.57 K/UL — SIGNIFICANT CHANGE UP (ref 1.2–3.4)
LYMPHOCYTES # BLD AUTO: 3.63 K/UL — HIGH (ref 1.2–3.4)
LYMPHOCYTES # BLD AUTO: 5.5 % — LOW (ref 20.5–51.1)
LYMPHOCYTES # BLD AUTO: 6.2 % — LOW (ref 20.5–51.1)
LYMPHOCYTES # BLD AUTO: 6.9 % — LOW (ref 20.5–51.1)
LYMPHOCYTES # BLD AUTO: 6.9 % — LOW (ref 20.5–51.1)
LYMPHOCYTES # BLD AUTO: 7.5 % — LOW (ref 20.5–51.1)
LYMPHOCYTES # BLD AUTO: 8 % — LOW (ref 20.5–51.1)
LYMPHOCYTES # BLD AUTO: 8.8 % — LOW (ref 20.5–51.1)
LYMPHOCYTES # BLD AUTO: 9.3 % — LOW (ref 20.5–51.1)
LYMPHOCYTES # BLD AUTO: 9.6 % — LOW (ref 20.5–51.1)
MACROCYTES BLD QL: SLIGHT — SIGNIFICANT CHANGE UP
MACROCYTES BLD QL: SLIGHT — SIGNIFICANT CHANGE UP
MAGNESIUM SERPL-MCNC: 2.2 MG/DL — SIGNIFICANT CHANGE UP (ref 1.8–2.4)
MAGNESIUM SERPL-MCNC: 2.3 MG/DL — SIGNIFICANT CHANGE UP (ref 1.8–2.4)
MAGNESIUM SERPL-MCNC: 2.4 MG/DL — SIGNIFICANT CHANGE UP (ref 1.8–2.4)
MAGNESIUM SERPL-MCNC: 2.6 MG/DL — HIGH (ref 1.8–2.4)
MAGNESIUM SERPL-MCNC: 2.9 MG/DL — HIGH (ref 1.8–2.4)
MANUAL SMEAR VERIFICATION: SIGNIFICANT CHANGE UP
MCHC RBC-ENTMCNC: 28.8 PG — SIGNIFICANT CHANGE UP (ref 27–31)
MCHC RBC-ENTMCNC: 29.1 PG — SIGNIFICANT CHANGE UP (ref 27–31)
MCHC RBC-ENTMCNC: 29.1 PG — SIGNIFICANT CHANGE UP (ref 27–31)
MCHC RBC-ENTMCNC: 29.2 PG — SIGNIFICANT CHANGE UP (ref 27–31)
MCHC RBC-ENTMCNC: 29.3 PG — SIGNIFICANT CHANGE UP (ref 27–31)
MCHC RBC-ENTMCNC: 29.3 PG — SIGNIFICANT CHANGE UP (ref 27–31)
MCHC RBC-ENTMCNC: 29.4 PG — SIGNIFICANT CHANGE UP (ref 27–31)
MCHC RBC-ENTMCNC: 29.5 PG — SIGNIFICANT CHANGE UP (ref 27–31)
MCHC RBC-ENTMCNC: 29.5 PG — SIGNIFICANT CHANGE UP (ref 27–31)
MCHC RBC-ENTMCNC: 29.6 PG — SIGNIFICANT CHANGE UP (ref 27–31)
MCHC RBC-ENTMCNC: 29.7 PG — SIGNIFICANT CHANGE UP (ref 27–31)
MCHC RBC-ENTMCNC: 29.8 PG — SIGNIFICANT CHANGE UP (ref 27–31)
MCHC RBC-ENTMCNC: 29.8 PG — SIGNIFICANT CHANGE UP (ref 27–31)
MCHC RBC-ENTMCNC: 30 PG — SIGNIFICANT CHANGE UP (ref 27–31)
MCHC RBC-ENTMCNC: 30.1 PG — SIGNIFICANT CHANGE UP (ref 27–31)
MCHC RBC-ENTMCNC: 30.8 PG — SIGNIFICANT CHANGE UP (ref 27–31)
MCHC RBC-ENTMCNC: 31.9 G/DL — LOW (ref 32–37)
MCHC RBC-ENTMCNC: 32 G/DL — SIGNIFICANT CHANGE UP (ref 32–37)
MCHC RBC-ENTMCNC: 32.3 G/DL — SIGNIFICANT CHANGE UP (ref 32–37)
MCHC RBC-ENTMCNC: 32.4 G/DL — SIGNIFICANT CHANGE UP (ref 32–37)
MCHC RBC-ENTMCNC: 32.5 G/DL — SIGNIFICANT CHANGE UP (ref 32–37)
MCHC RBC-ENTMCNC: 32.6 G/DL — SIGNIFICANT CHANGE UP (ref 32–37)
MCHC RBC-ENTMCNC: 32.6 G/DL — SIGNIFICANT CHANGE UP (ref 32–37)
MCHC RBC-ENTMCNC: 32.8 G/DL — SIGNIFICANT CHANGE UP (ref 32–37)
MCHC RBC-ENTMCNC: 32.8 G/DL — SIGNIFICANT CHANGE UP (ref 32–37)
MCHC RBC-ENTMCNC: 32.9 G/DL — SIGNIFICANT CHANGE UP (ref 32–37)
MCHC RBC-ENTMCNC: 33.2 G/DL — SIGNIFICANT CHANGE UP (ref 32–37)
MCHC RBC-ENTMCNC: 33.2 G/DL — SIGNIFICANT CHANGE UP (ref 32–37)
MCHC RBC-ENTMCNC: 33.5 G/DL — SIGNIFICANT CHANGE UP (ref 32–37)
MCHC RBC-ENTMCNC: 33.9 G/DL — SIGNIFICANT CHANGE UP (ref 32–37)
MCV RBC AUTO: 87.8 FL — SIGNIFICANT CHANGE UP (ref 80–94)
MCV RBC AUTO: 88.7 FL — SIGNIFICANT CHANGE UP (ref 80–94)
MCV RBC AUTO: 88.8 FL — SIGNIFICANT CHANGE UP (ref 80–94)
MCV RBC AUTO: 89 FL — SIGNIFICANT CHANGE UP (ref 80–94)
MCV RBC AUTO: 89.1 FL — SIGNIFICANT CHANGE UP (ref 80–94)
MCV RBC AUTO: 89.2 FL — SIGNIFICANT CHANGE UP (ref 80–94)
MCV RBC AUTO: 89.7 FL — SIGNIFICANT CHANGE UP (ref 80–94)
MCV RBC AUTO: 89.8 FL — SIGNIFICANT CHANGE UP (ref 80–94)
MCV RBC AUTO: 89.9 FL — SIGNIFICANT CHANGE UP (ref 80–94)
MCV RBC AUTO: 90.3 FL — SIGNIFICANT CHANGE UP (ref 80–94)
MCV RBC AUTO: 90.5 FL — SIGNIFICANT CHANGE UP (ref 80–94)
MCV RBC AUTO: 90.7 FL — SIGNIFICANT CHANGE UP (ref 80–94)
MCV RBC AUTO: 91.1 FL — SIGNIFICANT CHANGE UP (ref 80–94)
MCV RBC AUTO: 91.2 FL — SIGNIFICANT CHANGE UP (ref 80–94)
MCV RBC AUTO: 91.4 FL — SIGNIFICANT CHANGE UP (ref 80–94)
MCV RBC AUTO: 91.4 FL — SIGNIFICANT CHANGE UP (ref 80–94)
MCV RBC AUTO: 91.6 FL — SIGNIFICANT CHANGE UP (ref 80–94)
MCV RBC AUTO: 94.6 FL — HIGH (ref 80–94)
METAMYELOCYTES # FLD: 1 % — HIGH (ref 0–0)
MONOCYTES # BLD AUTO: 0.05 K/UL — LOW (ref 0.1–0.6)
MONOCYTES # BLD AUTO: 0.11 K/UL — SIGNIFICANT CHANGE UP (ref 0.1–0.6)
MONOCYTES # BLD AUTO: 0.35 K/UL — SIGNIFICANT CHANGE UP (ref 0.1–0.6)
MONOCYTES # BLD AUTO: 0.44 K/UL — SIGNIFICANT CHANGE UP (ref 0.1–0.6)
MONOCYTES # BLD AUTO: 0.48 K/UL — SIGNIFICANT CHANGE UP (ref 0.1–0.6)
MONOCYTES # BLD AUTO: 0.54 K/UL — SIGNIFICANT CHANGE UP (ref 0.1–0.6)
MONOCYTES # BLD AUTO: 0.68 K/UL — HIGH (ref 0.1–0.6)
MONOCYTES # BLD AUTO: 0.69 K/UL — HIGH (ref 0.1–0.6)
MONOCYTES # BLD AUTO: 0.69 K/UL — HIGH (ref 0.1–0.6)
MONOCYTES # BLD AUTO: 0.84 K/UL — HIGH (ref 0.1–0.6)
MONOCYTES # BLD AUTO: 0.88 K/UL — HIGH (ref 0.1–0.6)
MONOCYTES # BLD AUTO: 0.97 K/UL — HIGH (ref 0.1–0.6)
MONOCYTES # BLD AUTO: 1.52 K/UL — HIGH (ref 0.1–0.6)
MONOCYTES NFR BLD AUTO: 0.4 % — LOW (ref 1.7–9.3)
MONOCYTES NFR BLD AUTO: 0.9 % — LOW (ref 1.7–9.3)
MONOCYTES NFR BLD AUTO: 2.5 % — SIGNIFICANT CHANGE UP (ref 1.7–9.3)
MONOCYTES NFR BLD AUTO: 3 % — SIGNIFICANT CHANGE UP (ref 1.7–9.3)
MONOCYTES NFR BLD AUTO: 4 % — SIGNIFICANT CHANGE UP (ref 1.7–9.3)
MONOCYTES NFR BLD AUTO: 4.1 % — SIGNIFICANT CHANGE UP (ref 1.7–9.3)
MONOCYTES NFR BLD AUTO: 4.2 % — SIGNIFICANT CHANGE UP (ref 1.7–9.3)
MONOCYTES NFR BLD AUTO: 4.3 % — SIGNIFICANT CHANGE UP (ref 1.7–9.3)
MONOCYTES NFR BLD AUTO: 4.4 % — SIGNIFICANT CHANGE UP (ref 1.7–9.3)
MONOCYTES NFR BLD AUTO: 4.6 % — SIGNIFICANT CHANGE UP (ref 1.7–9.3)
MONOCYTES NFR BLD AUTO: 5 % — SIGNIFICANT CHANGE UP (ref 1.7–9.3)
MONOCYTES NFR BLD AUTO: 5.4 % — SIGNIFICANT CHANGE UP (ref 1.7–9.3)
MONOCYTES NFR BLD AUTO: 5.7 % — SIGNIFICANT CHANGE UP (ref 1.7–9.3)
MRSA PCR RESULT.: NEGATIVE — SIGNIFICANT CHANGE UP
MYELOCYTES NFR BLD: 0.9 % — HIGH (ref 0–0)
MYELOCYTES NFR BLD: 4.4 % — HIGH (ref 0–0)
NEUTROPHILS # BLD AUTO: 10.41 K/UL — HIGH (ref 1.4–6.5)
NEUTROPHILS # BLD AUTO: 11 K/UL — HIGH (ref 1.4–6.5)
NEUTROPHILS # BLD AUTO: 13.27 K/UL — HIGH (ref 1.4–6.5)
NEUTROPHILS # BLD AUTO: 14.71 K/UL — HIGH (ref 1.4–6.5)
NEUTROPHILS # BLD AUTO: 15.14 K/UL — HIGH (ref 1.4–6.5)
NEUTROPHILS # BLD AUTO: 15.7 K/UL — HIGH (ref 1.4–6.5)
NEUTROPHILS # BLD AUTO: 15.71 K/UL — HIGH (ref 1.4–6.5)
NEUTROPHILS # BLD AUTO: 18.73 K/UL — HIGH (ref 1.4–6.5)
NEUTROPHILS # BLD AUTO: 27.56 K/UL — HIGH (ref 1.4–6.5)
NEUTROPHILS # BLD AUTO: 8.64 K/UL — HIGH (ref 1.4–6.5)
NEUTROPHILS # BLD AUTO: 9.32 K/UL — HIGH (ref 1.4–6.5)
NEUTROPHILS # BLD AUTO: 9.48 K/UL — HIGH (ref 1.4–6.5)
NEUTROPHILS # BLD AUTO: 9.83 K/UL — HIGH (ref 1.4–6.5)
NEUTROPHILS NFR BLD AUTO: 76.6 % — HIGH (ref 42.2–75.2)
NEUTROPHILS NFR BLD AUTO: 79.7 % — HIGH (ref 42.2–75.2)
NEUTROPHILS NFR BLD AUTO: 79.8 % — HIGH (ref 42.2–75.2)
NEUTROPHILS NFR BLD AUTO: 80.1 % — HIGH (ref 42.2–75.2)
NEUTROPHILS NFR BLD AUTO: 82.8 % — HIGH (ref 42.2–75.2)
NEUTROPHILS NFR BLD AUTO: 84.6 % — HIGH (ref 42.2–75.2)
NEUTROPHILS NFR BLD AUTO: 85.2 % — HIGH (ref 42.2–75.2)
NEUTROPHILS NFR BLD AUTO: 85.5 % — HIGH (ref 42.2–75.2)
NEUTROPHILS NFR BLD AUTO: 86.7 % — HIGH (ref 42.2–75.2)
NEUTROPHILS NFR BLD AUTO: 86.8 % — HIGH (ref 42.2–75.2)
NEUTROPHILS NFR BLD AUTO: 88.6 % — HIGH (ref 42.2–75.2)
NEUTROPHILS NFR BLD AUTO: 89.3 % — HIGH (ref 42.2–75.2)
NEUTROPHILS NFR BLD AUTO: 92.4 % — HIGH (ref 42.2–75.2)
NEUTS BAND # BLD: 1 % — SIGNIFICANT CHANGE UP (ref 0–6)
NRBC # BLD: 0 /100 WBCS — SIGNIFICANT CHANGE UP (ref 0–0)
NRBC # BLD: 0 /100 — SIGNIFICANT CHANGE UP (ref 0–0)
NT-PROBNP SERPL-SCNC: 1361 PG/ML — HIGH (ref 0–300)
PHOSPHATE SERPL-MCNC: 12.1 MG/DL — HIGH (ref 2.1–4.9)
PHOSPHATE SERPL-MCNC: 2.6 MG/DL — SIGNIFICANT CHANGE UP (ref 2.1–4.9)
PHOSPHATE SERPL-MCNC: 2.8 MG/DL — SIGNIFICANT CHANGE UP (ref 2.1–4.9)
PHOSPHATE SERPL-MCNC: 3.2 MG/DL — SIGNIFICANT CHANGE UP (ref 2.1–4.9)
PHOSPHATE SERPL-MCNC: 3.2 MG/DL — SIGNIFICANT CHANGE UP (ref 2.1–4.9)
PHOSPHATE SERPL-MCNC: 3.4 MG/DL — SIGNIFICANT CHANGE UP (ref 2.1–4.9)
PLAT MORPH BLD: NORMAL — SIGNIFICANT CHANGE UP
PLATELET # BLD AUTO: 157 K/UL — SIGNIFICANT CHANGE UP (ref 130–400)
PLATELET # BLD AUTO: 187 K/UL — SIGNIFICANT CHANGE UP (ref 130–400)
PLATELET # BLD AUTO: 191 K/UL — SIGNIFICANT CHANGE UP (ref 130–400)
PLATELET # BLD AUTO: 192 K/UL — SIGNIFICANT CHANGE UP (ref 130–400)
PLATELET # BLD AUTO: 195 K/UL — SIGNIFICANT CHANGE UP (ref 130–400)
PLATELET # BLD AUTO: 218 K/UL — SIGNIFICANT CHANGE UP (ref 130–400)
PLATELET # BLD AUTO: 219 K/UL — SIGNIFICANT CHANGE UP (ref 130–400)
PLATELET # BLD AUTO: 226 K/UL — SIGNIFICANT CHANGE UP (ref 130–400)
PLATELET # BLD AUTO: 254 K/UL — SIGNIFICANT CHANGE UP (ref 130–400)
PLATELET # BLD AUTO: 291 K/UL — SIGNIFICANT CHANGE UP (ref 130–400)
PLATELET # BLD AUTO: 308 K/UL — SIGNIFICANT CHANGE UP (ref 130–400)
PLATELET # BLD AUTO: 308 K/UL — SIGNIFICANT CHANGE UP (ref 130–400)
PLATELET # BLD AUTO: 330 K/UL — SIGNIFICANT CHANGE UP (ref 130–400)
PLATELET # BLD AUTO: 350 K/UL — SIGNIFICANT CHANGE UP (ref 130–400)
PLATELET # BLD AUTO: 371 K/UL — SIGNIFICANT CHANGE UP (ref 130–400)
PLATELET # BLD AUTO: 377 K/UL — SIGNIFICANT CHANGE UP (ref 130–400)
PLATELET # BLD AUTO: 378 K/UL — SIGNIFICANT CHANGE UP (ref 130–400)
PLATELET # BLD AUTO: 380 K/UL — SIGNIFICANT CHANGE UP (ref 130–400)
POIKILOCYTOSIS BLD QL AUTO: SIGNIFICANT CHANGE UP
POIKILOCYTOSIS BLD QL AUTO: SLIGHT — SIGNIFICANT CHANGE UP
POLYCHROMASIA BLD QL SMEAR: SIGNIFICANT CHANGE UP
POTASSIUM SERPL-MCNC: 4.3 MMOL/L — SIGNIFICANT CHANGE UP (ref 3.5–5)
POTASSIUM SERPL-MCNC: 4.4 MMOL/L — SIGNIFICANT CHANGE UP (ref 3.5–5)
POTASSIUM SERPL-MCNC: 4.4 MMOL/L — SIGNIFICANT CHANGE UP (ref 3.5–5)
POTASSIUM SERPL-MCNC: 4.5 MMOL/L — SIGNIFICANT CHANGE UP (ref 3.5–5)
POTASSIUM SERPL-MCNC: 4.6 MMOL/L — SIGNIFICANT CHANGE UP (ref 3.5–5)
POTASSIUM SERPL-MCNC: 4.6 MMOL/L — SIGNIFICANT CHANGE UP (ref 3.5–5)
POTASSIUM SERPL-MCNC: 4.7 MMOL/L — SIGNIFICANT CHANGE UP (ref 3.5–5)
POTASSIUM SERPL-MCNC: 4.8 MMOL/L — SIGNIFICANT CHANGE UP (ref 3.5–5)
POTASSIUM SERPL-MCNC: 4.9 MMOL/L — SIGNIFICANT CHANGE UP (ref 3.5–5)
POTASSIUM SERPL-MCNC: 5 MMOL/L — SIGNIFICANT CHANGE UP (ref 3.5–5)
POTASSIUM SERPL-MCNC: 5.1 MMOL/L — HIGH (ref 3.5–5)
POTASSIUM SERPL-MCNC: 5.1 MMOL/L — HIGH (ref 3.5–5)
POTASSIUM SERPL-MCNC: 5.2 MMOL/L — HIGH (ref 3.5–5)
POTASSIUM SERPL-MCNC: 5.3 MMOL/L — HIGH (ref 3.5–5)
POTASSIUM SERPL-MCNC: 5.4 MMOL/L — HIGH (ref 3.5–5)
POTASSIUM SERPL-SCNC: 4.3 MMOL/L — SIGNIFICANT CHANGE UP (ref 3.5–5)
POTASSIUM SERPL-SCNC: 4.4 MMOL/L — SIGNIFICANT CHANGE UP (ref 3.5–5)
POTASSIUM SERPL-SCNC: 4.4 MMOL/L — SIGNIFICANT CHANGE UP (ref 3.5–5)
POTASSIUM SERPL-SCNC: 4.5 MMOL/L — SIGNIFICANT CHANGE UP (ref 3.5–5)
POTASSIUM SERPL-SCNC: 4.6 MMOL/L — SIGNIFICANT CHANGE UP (ref 3.5–5)
POTASSIUM SERPL-SCNC: 4.6 MMOL/L — SIGNIFICANT CHANGE UP (ref 3.5–5)
POTASSIUM SERPL-SCNC: 4.7 MMOL/L — SIGNIFICANT CHANGE UP (ref 3.5–5)
POTASSIUM SERPL-SCNC: 4.8 MMOL/L — SIGNIFICANT CHANGE UP (ref 3.5–5)
POTASSIUM SERPL-SCNC: 4.9 MMOL/L — SIGNIFICANT CHANGE UP (ref 3.5–5)
POTASSIUM SERPL-SCNC: 5 MMOL/L — SIGNIFICANT CHANGE UP (ref 3.5–5)
POTASSIUM SERPL-SCNC: 5.1 MMOL/L — HIGH (ref 3.5–5)
POTASSIUM SERPL-SCNC: 5.1 MMOL/L — HIGH (ref 3.5–5)
POTASSIUM SERPL-SCNC: 5.2 MMOL/L — HIGH (ref 3.5–5)
POTASSIUM SERPL-SCNC: 5.3 MMOL/L — HIGH (ref 3.5–5)
POTASSIUM SERPL-SCNC: 5.4 MMOL/L — HIGH (ref 3.5–5)
PROCALCITONIN SERPL-MCNC: 0.02 NG/ML — SIGNIFICANT CHANGE UP (ref 0.02–0.1)
PROCALCITONIN SERPL-MCNC: 0.03 NG/ML — SIGNIFICANT CHANGE UP (ref 0.02–0.1)
PROCALCITONIN SERPL-MCNC: 0.03 NG/ML — SIGNIFICANT CHANGE UP (ref 0.02–0.1)
PROCALCITONIN SERPL-MCNC: 0.04 NG/ML — SIGNIFICANT CHANGE UP (ref 0.02–0.1)
PROCALCITONIN SERPL-MCNC: 0.06 NG/ML — SIGNIFICANT CHANGE UP (ref 0.02–0.1)
PROCALCITONIN SERPL-MCNC: 0.13 NG/ML — HIGH (ref 0.02–0.1)
PROCALCITONIN SERPL-MCNC: 0.14 NG/ML — HIGH (ref 0.02–0.1)
PROCALCITONIN SERPL-MCNC: 0.16 NG/ML — HIGH (ref 0.02–0.1)
PROCALCITONIN SERPL-MCNC: 0.18 NG/ML — HIGH (ref 0.02–0.1)
PROCALCITONIN SERPL-MCNC: 0.24 NG/ML — HIGH (ref 0.02–0.1)
PROT SERPL-MCNC: 4.2 G/DL — LOW (ref 6–8)
PROT SERPL-MCNC: 5.3 G/DL — LOW (ref 6–8)
PROT SERPL-MCNC: 5.3 G/DL — LOW (ref 6–8)
PROT SERPL-MCNC: 5.5 G/DL — LOW (ref 6–8)
PROT SERPL-MCNC: 5.6 G/DL — LOW (ref 6–8)
PROT SERPL-MCNC: 5.7 G/DL — LOW (ref 6–8)
PROT SERPL-MCNC: 5.7 G/DL — LOW (ref 6–8)
PROT SERPL-MCNC: 5.8 G/DL — LOW (ref 6–8)
PROT SERPL-MCNC: 5.8 G/DL — LOW (ref 6–8)
PROT SERPL-MCNC: 5.9 G/DL — LOW (ref 6–8)
PROT SERPL-MCNC: 5.9 G/DL — LOW (ref 6–8)
PROT SERPL-MCNC: 6 G/DL — SIGNIFICANT CHANGE UP (ref 6–8)
PROT SERPL-MCNC: 6.1 G/DL — SIGNIFICANT CHANGE UP (ref 6–8)
PROT SERPL-MCNC: 6.2 G/DL — SIGNIFICANT CHANGE UP (ref 6–8)
PROT SERPL-MCNC: 6.3 G/DL — SIGNIFICANT CHANGE UP (ref 6–8)
PROT SERPL-MCNC: 6.4 G/DL — SIGNIFICANT CHANGE UP (ref 6–8)
PROT SERPL-MCNC: 6.8 G/DL — SIGNIFICANT CHANGE UP (ref 6–8)
PROTHROM AB SERPL-ACNC: 17.6 SEC — HIGH (ref 9.95–12.87)
PROTHROM AB SERPL-ACNC: 21.4 SEC — HIGH (ref 9.95–12.87)
RBC # BLD: 2.6 M/UL — LOW (ref 4.7–6.1)
RBC # BLD: 3.85 M/UL — LOW (ref 4.7–6.1)
RBC # BLD: 4.2 M/UL — LOW (ref 4.7–6.1)
RBC # BLD: 4.54 M/UL — LOW (ref 4.7–6.1)
RBC # BLD: 4.59 M/UL — LOW (ref 4.7–6.1)
RBC # BLD: 4.74 M/UL — SIGNIFICANT CHANGE UP (ref 4.7–6.1)
RBC # BLD: 4.76 M/UL — SIGNIFICANT CHANGE UP (ref 4.7–6.1)
RBC # BLD: 4.76 M/UL — SIGNIFICANT CHANGE UP (ref 4.7–6.1)
RBC # BLD: 4.77 M/UL — SIGNIFICANT CHANGE UP (ref 4.7–6.1)
RBC # BLD: 4.83 M/UL — SIGNIFICANT CHANGE UP (ref 4.7–6.1)
RBC # BLD: 4.85 M/UL — SIGNIFICANT CHANGE UP (ref 4.7–6.1)
RBC # BLD: 4.86 M/UL — SIGNIFICANT CHANGE UP (ref 4.7–6.1)
RBC # BLD: 4.98 M/UL — SIGNIFICANT CHANGE UP (ref 4.7–6.1)
RBC # BLD: 5.07 M/UL — SIGNIFICANT CHANGE UP (ref 4.7–6.1)
RBC # BLD: 5.09 M/UL — SIGNIFICANT CHANGE UP (ref 4.7–6.1)
RBC # BLD: 5.1 M/UL — SIGNIFICANT CHANGE UP (ref 4.7–6.1)
RBC # BLD: 5.17 M/UL — SIGNIFICANT CHANGE UP (ref 4.7–6.1)
RBC # BLD: 5.29 M/UL — SIGNIFICANT CHANGE UP (ref 4.7–6.1)
RBC # FLD: 13.2 % — SIGNIFICANT CHANGE UP (ref 11.5–14.5)
RBC # FLD: 13.3 % — SIGNIFICANT CHANGE UP (ref 11.5–14.5)
RBC # FLD: 13.4 % — SIGNIFICANT CHANGE UP (ref 11.5–14.5)
RBC # FLD: 13.5 % — SIGNIFICANT CHANGE UP (ref 11.5–14.5)
RBC # FLD: 13.6 % — SIGNIFICANT CHANGE UP (ref 11.5–14.5)
RBC # FLD: 13.7 % — SIGNIFICANT CHANGE UP (ref 11.5–14.5)
RBC # FLD: 13.8 % — SIGNIFICANT CHANGE UP (ref 11.5–14.5)
RBC # FLD: 13.8 % — SIGNIFICANT CHANGE UP (ref 11.5–14.5)
RBC # FLD: 13.9 % — SIGNIFICANT CHANGE UP (ref 11.5–14.5)
RBC BLD AUTO: ABNORMAL
RBC BLD AUTO: ABNORMAL
RBC BLD AUTO: NORMAL — SIGNIFICANT CHANGE UP
S PNEUM AG UR QL: NEGATIVE — SIGNIFICANT CHANGE UP
SARS-COV-2 IGG SERPL QL IA: POSITIVE
SARS-COV-2 IGM SERPL IA-ACNC: 10.62 RATIO — HIGH
SARS-COV-2 RNA SPEC QL NAA+PROBE: DETECTED
SARS-COV-2 RNA SPEC QL NAA+PROBE: SIGNIFICANT CHANGE UP
SODIUM SERPL-SCNC: 129 MMOL/L — LOW (ref 135–146)
SODIUM SERPL-SCNC: 130 MMOL/L — LOW (ref 135–146)
SODIUM SERPL-SCNC: 134 MMOL/L — LOW (ref 135–146)
SODIUM SERPL-SCNC: 135 MMOL/L — SIGNIFICANT CHANGE UP (ref 135–146)
SODIUM SERPL-SCNC: 136 MMOL/L — SIGNIFICANT CHANGE UP (ref 135–146)
SODIUM SERPL-SCNC: 137 MMOL/L — SIGNIFICANT CHANGE UP (ref 135–146)
SODIUM SERPL-SCNC: 138 MMOL/L — SIGNIFICANT CHANGE UP (ref 135–146)
SODIUM SERPL-SCNC: 139 MMOL/L — SIGNIFICANT CHANGE UP (ref 135–146)
SODIUM SERPL-SCNC: 139 MMOL/L — SIGNIFICANT CHANGE UP (ref 135–146)
SODIUM SERPL-SCNC: 140 MMOL/L — SIGNIFICANT CHANGE UP (ref 135–146)
SODIUM SERPL-SCNC: 141 MMOL/L — SIGNIFICANT CHANGE UP (ref 135–146)
SODIUM SERPL-SCNC: 143 MMOL/L — SIGNIFICANT CHANGE UP (ref 135–146)
SPECIMEN SOURCE: SIGNIFICANT CHANGE UP
SPECIMEN SOURCE: SIGNIFICANT CHANGE UP
TOTAL CHOLESTEROL/HDL RATIO MEASUREMENT: 6.2 RATIO — HIGH (ref 4–5.5)
TRIGL SERPL-MCNC: 132 MG/DL — SIGNIFICANT CHANGE UP (ref 10–149)
TSH SERPL-MCNC: 3.43 UIU/ML — SIGNIFICANT CHANGE UP (ref 0.27–4.2)
VARIANT LYMPHS # BLD: 0.9 % — SIGNIFICANT CHANGE UP (ref 0–5)
WBC # BLD: 10.84 K/UL — HIGH (ref 4.8–10.8)
WBC # BLD: 10.86 K/UL — HIGH (ref 4.8–10.8)
WBC # BLD: 11.25 K/UL — HIGH (ref 4.8–10.8)
WBC # BLD: 11.53 K/UL — HIGH (ref 4.8–10.8)
WBC # BLD: 11.75 K/UL — HIGH (ref 4.8–10.8)
WBC # BLD: 11.84 K/UL — HIGH (ref 4.8–10.8)
WBC # BLD: 11.9 K/UL — HIGH (ref 4.8–10.8)
WBC # BLD: 11.99 K/UL — HIGH (ref 4.8–10.8)
WBC # BLD: 12.13 K/UL — HIGH (ref 4.8–10.8)
WBC # BLD: 15.95 K/UL — HIGH (ref 4.8–10.8)
WBC # BLD: 16.97 K/UL — HIGH (ref 4.8–10.8)
WBC # BLD: 17.32 K/UL — HIGH (ref 4.8–10.8)
WBC # BLD: 17.71 K/UL — HIGH (ref 4.8–10.8)
WBC # BLD: 17.91 K/UL — HIGH (ref 4.8–10.8)
WBC # BLD: 18.37 K/UL — HIGH (ref 4.8–10.8)
WBC # BLD: 20.99 K/UL — HIGH (ref 4.8–10.8)
WBC # BLD: 34.54 K/UL — HIGH (ref 4.8–10.8)
WBC # BLD: 6.64 K/UL — SIGNIFICANT CHANGE UP (ref 4.8–10.8)
WBC # FLD AUTO: 10.84 K/UL — HIGH (ref 4.8–10.8)
WBC # FLD AUTO: 10.86 K/UL — HIGH (ref 4.8–10.8)
WBC # FLD AUTO: 11.25 K/UL — HIGH (ref 4.8–10.8)
WBC # FLD AUTO: 11.53 K/UL — HIGH (ref 4.8–10.8)
WBC # FLD AUTO: 11.75 K/UL — HIGH (ref 4.8–10.8)
WBC # FLD AUTO: 11.84 K/UL — HIGH (ref 4.8–10.8)
WBC # FLD AUTO: 11.9 K/UL — HIGH (ref 4.8–10.8)
WBC # FLD AUTO: 11.99 K/UL — HIGH (ref 4.8–10.8)
WBC # FLD AUTO: 12.13 K/UL — HIGH (ref 4.8–10.8)
WBC # FLD AUTO: 15.95 K/UL — HIGH (ref 4.8–10.8)
WBC # FLD AUTO: 16.97 K/UL — HIGH (ref 4.8–10.8)
WBC # FLD AUTO: 17.32 K/UL — HIGH (ref 4.8–10.8)
WBC # FLD AUTO: 17.71 K/UL — HIGH (ref 4.8–10.8)
WBC # FLD AUTO: 17.91 K/UL — HIGH (ref 4.8–10.8)
WBC # FLD AUTO: 18.37 K/UL — HIGH (ref 4.8–10.8)
WBC # FLD AUTO: 20.99 K/UL — HIGH (ref 4.8–10.8)
WBC # FLD AUTO: 34.54 K/UL — HIGH (ref 4.8–10.8)
WBC # FLD AUTO: 6.64 K/UL — SIGNIFICANT CHANGE UP (ref 4.8–10.8)

## 2020-01-01 PROCEDURE — 93306 TTE W/DOPPLER COMPLETE: CPT | Mod: 26

## 2020-01-01 PROCEDURE — 93970 EXTREMITY STUDY: CPT | Mod: 26

## 2020-01-01 PROCEDURE — 71045 X-RAY EXAM CHEST 1 VIEW: CPT | Mod: 26

## 2020-01-01 PROCEDURE — 99222 1ST HOSP IP/OBS MODERATE 55: CPT

## 2020-01-01 PROCEDURE — 37191 INS ENDOVAS VENA CAVA FILTR: CPT

## 2020-01-01 PROCEDURE — 99233 SBSQ HOSP IP/OBS HIGH 50: CPT

## 2020-01-01 PROCEDURE — 71045 X-RAY EXAM CHEST 1 VIEW: CPT | Mod: 26,77

## 2020-01-01 PROCEDURE — 99223 1ST HOSP IP/OBS HIGH 75: CPT

## 2020-01-01 PROCEDURE — 99291 CRITICAL CARE FIRST HOUR: CPT

## 2020-01-01 PROCEDURE — 93010 ELECTROCARDIOGRAM REPORT: CPT

## 2020-01-01 RX ORDER — SODIUM ZIRCONIUM CYCLOSILICATE 10 G/10G
5 POWDER, FOR SUSPENSION ORAL DAILY
Refills: 0 | Status: DISCONTINUED | OUTPATIENT
Start: 2020-01-01 | End: 2020-01-01

## 2020-01-01 RX ORDER — DEXTROSE 50 % IN WATER 50 %
25 SYRINGE (ML) INTRAVENOUS ONCE
Refills: 0 | Status: COMPLETED | OUTPATIENT
Start: 2020-01-01 | End: 2020-01-01

## 2020-01-01 RX ORDER — VANCOMYCIN HCL 1 G
VIAL (EA) INTRAVENOUS
Refills: 0 | Status: DISCONTINUED | OUTPATIENT
Start: 2020-01-01 | End: 2020-01-01

## 2020-01-01 RX ORDER — VANCOMYCIN HCL 1 G
1000 VIAL (EA) INTRAVENOUS EVERY 8 HOURS
Refills: 0 | Status: DISCONTINUED | OUTPATIENT
Start: 2020-01-01 | End: 2020-01-01

## 2020-01-01 RX ORDER — TOCILIZUMAB 20 MG/ML
400 INJECTION, SOLUTION, CONCENTRATE INTRAVENOUS ONCE
Refills: 0 | Status: COMPLETED | OUTPATIENT
Start: 2020-01-01 | End: 2020-01-01

## 2020-01-01 RX ORDER — CLONAZEPAM 1 MG
0.5 TABLET ORAL ONCE
Refills: 0 | Status: DISCONTINUED | OUTPATIENT
Start: 2020-01-01 | End: 2020-01-01

## 2020-01-01 RX ORDER — ALPRAZOLAM 0.25 MG
0.25 TABLET ORAL ONCE
Refills: 0 | Status: DISCONTINUED | OUTPATIENT
Start: 2020-01-01 | End: 2020-01-01

## 2020-01-01 RX ORDER — MORPHINE SULFATE 50 MG/1
2 CAPSULE, EXTENDED RELEASE ORAL ONCE
Refills: 0 | Status: DISCONTINUED | OUTPATIENT
Start: 2020-01-01 | End: 2020-01-01

## 2020-01-01 RX ORDER — NOREPINEPHRINE BITARTRATE/D5W 8 MG/250ML
0.05 PLASTIC BAG, INJECTION (ML) INTRAVENOUS
Qty: 8 | Refills: 0 | Status: DISCONTINUED | OUTPATIENT
Start: 2020-01-01 | End: 2020-01-01

## 2020-01-01 RX ORDER — VANCOMYCIN HCL 1 G
1000 VIAL (EA) INTRAVENOUS ONCE
Refills: 0 | Status: COMPLETED | OUTPATIENT
Start: 2020-01-01 | End: 2020-01-01

## 2020-01-01 RX ORDER — SODIUM CHLORIDE 9 MG/ML
1000 INJECTION, SOLUTION INTRAVENOUS
Refills: 0 | Status: DISCONTINUED | OUTPATIENT
Start: 2020-01-01 | End: 2020-01-01

## 2020-01-01 RX ORDER — CEFTRIAXONE 500 MG/1
1000 INJECTION, POWDER, FOR SOLUTION INTRAMUSCULAR; INTRAVENOUS ONCE
Refills: 0 | Status: COMPLETED | OUTPATIENT
Start: 2020-01-01 | End: 2020-01-01

## 2020-01-01 RX ORDER — REMDESIVIR 5 MG/ML
200 INJECTION INTRAVENOUS EVERY 24 HOURS
Refills: 0 | Status: COMPLETED | OUTPATIENT
Start: 2020-01-01 | End: 2020-01-01

## 2020-01-01 RX ORDER — CASPOFUNGIN ACETATE 7 MG/ML
INJECTION, POWDER, LYOPHILIZED, FOR SOLUTION INTRAVENOUS
Refills: 0 | Status: DISCONTINUED | OUTPATIENT
Start: 2020-01-01 | End: 2020-01-01

## 2020-01-01 RX ORDER — MEROPENEM 1 G/30ML
1000 INJECTION INTRAVENOUS ONCE
Refills: 0 | Status: COMPLETED | OUTPATIENT
Start: 2020-01-01 | End: 2020-01-01

## 2020-01-01 RX ORDER — INSULIN LISPRO 100/ML
VIAL (ML) SUBCUTANEOUS
Refills: 0 | Status: DISCONTINUED | OUTPATIENT
Start: 2020-01-01 | End: 2020-01-01

## 2020-01-01 RX ORDER — CEFTRIAXONE 500 MG/1
1000 INJECTION, POWDER, FOR SOLUTION INTRAMUSCULAR; INTRAVENOUS EVERY 24 HOURS
Refills: 0 | Status: DISCONTINUED | OUTPATIENT
Start: 2020-01-01 | End: 2020-01-01

## 2020-01-01 RX ORDER — POLYETHYLENE GLYCOL 3350 17 G/17G
17 POWDER, FOR SOLUTION ORAL DAILY
Refills: 0 | Status: DISCONTINUED | OUTPATIENT
Start: 2020-01-01 | End: 2020-01-01

## 2020-01-01 RX ORDER — REMDESIVIR 5 MG/ML
INJECTION INTRAVENOUS
Refills: 0 | Status: COMPLETED | OUTPATIENT
Start: 2020-01-01 | End: 2020-01-01

## 2020-01-01 RX ORDER — VANCOMYCIN HCL 1 G
1000 VIAL (EA) INTRAVENOUS EVERY 12 HOURS
Refills: 0 | Status: DISCONTINUED | OUTPATIENT
Start: 2020-01-01 | End: 2020-01-01

## 2020-01-01 RX ORDER — MEROPENEM 1 G/30ML
1000 INJECTION INTRAVENOUS EVERY 8 HOURS
Refills: 0 | Status: DISCONTINUED | OUTPATIENT
Start: 2020-01-01 | End: 2020-01-01

## 2020-01-01 RX ORDER — FENTANYL CITRATE 50 UG/ML
0.5 INJECTION INTRAVENOUS
Qty: 2500 | Refills: 0 | Status: DISCONTINUED | OUTPATIENT
Start: 2020-01-01 | End: 2020-01-01

## 2020-01-01 RX ORDER — FUROSEMIDE 40 MG
40 TABLET ORAL ONCE
Refills: 0 | Status: COMPLETED | OUTPATIENT
Start: 2020-01-01 | End: 2020-01-01

## 2020-01-01 RX ORDER — DEXAMETHASONE 0.5 MG/5ML
6 ELIXIR ORAL DAILY
Refills: 0 | Status: DISCONTINUED | OUTPATIENT
Start: 2020-01-01 | End: 2020-01-01

## 2020-01-01 RX ORDER — ENOXAPARIN SODIUM 100 MG/ML
70 INJECTION SUBCUTANEOUS EVERY 12 HOURS
Refills: 0 | Status: DISCONTINUED | OUTPATIENT
Start: 2020-01-01 | End: 2020-01-01

## 2020-01-01 RX ORDER — ACETAMINOPHEN 500 MG
650 TABLET ORAL ONCE
Refills: 0 | Status: COMPLETED | OUTPATIENT
Start: 2020-01-01 | End: 2020-01-01

## 2020-01-01 RX ORDER — ONDANSETRON 8 MG/1
4 TABLET, FILM COATED ORAL ONCE
Refills: 0 | Status: COMPLETED | OUTPATIENT
Start: 2020-01-01 | End: 2020-01-01

## 2020-01-01 RX ORDER — REMDESIVIR 5 MG/ML
100 INJECTION INTRAVENOUS EVERY 24 HOURS
Refills: 0 | Status: COMPLETED | OUTPATIENT
Start: 2020-01-01 | End: 2020-01-01

## 2020-01-01 RX ORDER — DEXTROSE 50 % IN WATER 50 %
12.5 SYRINGE (ML) INTRAVENOUS ONCE
Refills: 0 | Status: DISCONTINUED | OUTPATIENT
Start: 2020-01-01 | End: 2020-01-01

## 2020-01-01 RX ORDER — PANTOPRAZOLE SODIUM 20 MG/1
0 TABLET, DELAYED RELEASE ORAL
Qty: 0 | Refills: 0 | DISCHARGE

## 2020-01-01 RX ORDER — ATENOLOL 25 MG/1
1 TABLET ORAL
Qty: 0 | Refills: 0 | DISCHARGE

## 2020-01-01 RX ORDER — HYDROCORTISONE 20 MG
100 TABLET ORAL EVERY 8 HOURS
Refills: 0 | Status: DISCONTINUED | OUTPATIENT
Start: 2020-01-01 | End: 2020-01-01

## 2020-01-01 RX ORDER — HYDROMORPHONE HYDROCHLORIDE 2 MG/ML
2 INJECTION INTRAMUSCULAR; INTRAVENOUS; SUBCUTANEOUS ONCE
Refills: 0 | Status: DISCONTINUED | OUTPATIENT
Start: 2020-01-01 | End: 2020-01-01

## 2020-01-01 RX ORDER — MEROPENEM 1 G/30ML
INJECTION INTRAVENOUS
Refills: 0 | Status: DISCONTINUED | OUTPATIENT
Start: 2020-01-01 | End: 2020-01-01

## 2020-01-01 RX ORDER — ACETAMINOPHEN 500 MG
975 TABLET ORAL ONCE
Refills: 0 | Status: COMPLETED | OUTPATIENT
Start: 2020-01-01 | End: 2020-01-01

## 2020-01-01 RX ORDER — CEFTRIAXONE 500 MG/1
INJECTION, POWDER, FOR SOLUTION INTRAMUSCULAR; INTRAVENOUS
Refills: 0 | Status: DISCONTINUED | OUTPATIENT
Start: 2020-01-01 | End: 2020-01-01

## 2020-01-01 RX ORDER — INSULIN HUMAN 100 [IU]/ML
10 INJECTION, SOLUTION SUBCUTANEOUS ONCE
Refills: 0 | Status: COMPLETED | OUTPATIENT
Start: 2020-01-01 | End: 2020-01-01

## 2020-01-01 RX ORDER — REMDESIVIR 5 MG/ML
INJECTION INTRAVENOUS
Refills: 0 | Status: DISCONTINUED | OUTPATIENT
Start: 2020-01-01 | End: 2020-01-01

## 2020-01-01 RX ORDER — GUAIFENESIN/DEXTROMETHORPHAN 600MG-30MG
5 TABLET, EXTENDED RELEASE 12 HR ORAL EVERY 6 HOURS
Refills: 0 | Status: DISCONTINUED | OUTPATIENT
Start: 2020-01-01 | End: 2020-01-01

## 2020-01-01 RX ORDER — CASPOFUNGIN ACETATE 7 MG/ML
50 INJECTION, POWDER, LYOPHILIZED, FOR SOLUTION INTRAVENOUS EVERY 24 HOURS
Refills: 0 | Status: DISCONTINUED | OUTPATIENT
Start: 2020-08-23 | End: 2020-01-01

## 2020-01-01 RX ORDER — CASPOFUNGIN ACETATE 7 MG/ML
50 INJECTION, POWDER, LYOPHILIZED, FOR SOLUTION INTRAVENOUS ONCE
Refills: 0 | Status: COMPLETED | OUTPATIENT
Start: 2020-01-01 | End: 2020-01-01

## 2020-01-01 RX ORDER — DEXTROSE 50 % IN WATER 50 %
15 SYRINGE (ML) INTRAVENOUS ONCE
Refills: 0 | Status: DISCONTINUED | OUTPATIENT
Start: 2020-01-01 | End: 2020-01-01

## 2020-01-01 RX ORDER — PANTOPRAZOLE SODIUM 20 MG/1
40 TABLET, DELAYED RELEASE ORAL
Refills: 0 | Status: DISCONTINUED | OUTPATIENT
Start: 2020-01-01 | End: 2020-01-01

## 2020-01-01 RX ORDER — MIDAZOLAM HYDROCHLORIDE 1 MG/ML
0.02 INJECTION, SOLUTION INTRAMUSCULAR; INTRAVENOUS
Qty: 100 | Refills: 0 | Status: DISCONTINUED | OUTPATIENT
Start: 2020-01-01 | End: 2020-01-01

## 2020-01-01 RX ORDER — SODIUM BICARBONATE 1 MEQ/ML
150 SYRINGE (ML) INTRAVENOUS ONCE
Refills: 0 | Status: DISCONTINUED | OUTPATIENT
Start: 2020-01-01 | End: 2020-01-01

## 2020-01-01 RX ORDER — CHOLECALCIFEROL (VITAMIN D3) 125 MCG
0 CAPSULE ORAL
Qty: 0 | Refills: 0 | DISCHARGE

## 2020-01-01 RX ORDER — DEXTROSE 50 % IN WATER 50 %
25 SYRINGE (ML) INTRAVENOUS ONCE
Refills: 0 | Status: DISCONTINUED | OUTPATIENT
Start: 2020-01-01 | End: 2020-01-01

## 2020-01-01 RX ORDER — DEXAMETHASONE 0.5 MG/5ML
12 ELIXIR ORAL ONCE
Refills: 0 | Status: COMPLETED | OUTPATIENT
Start: 2020-01-01 | End: 2020-01-01

## 2020-01-01 RX ORDER — ALBUTEROL 90 UG/1
2 AEROSOL, METERED ORAL EVERY 6 HOURS
Refills: 0 | Status: DISCONTINUED | OUTPATIENT
Start: 2020-01-01 | End: 2020-01-01

## 2020-01-01 RX ORDER — ATENOLOL 25 MG/1
25 TABLET ORAL DAILY
Refills: 0 | Status: DISCONTINUED | OUTPATIENT
Start: 2020-01-01 | End: 2020-01-01

## 2020-01-01 RX ORDER — GLUCAGON INJECTION, SOLUTION 0.5 MG/.1ML
1 INJECTION, SOLUTION SUBCUTANEOUS ONCE
Refills: 0 | Status: DISCONTINUED | OUTPATIENT
Start: 2020-01-01 | End: 2020-01-01

## 2020-01-01 RX ORDER — HYDROMORPHONE HYDROCHLORIDE 2 MG/ML
1 INJECTION INTRAMUSCULAR; INTRAVENOUS; SUBCUTANEOUS ONCE
Refills: 0 | Status: DISCONTINUED | OUTPATIENT
Start: 2020-01-01 | End: 2020-01-01

## 2020-01-01 RX ORDER — SODIUM CHLORIDE 9 MG/ML
500 INJECTION, SOLUTION INTRAVENOUS ONCE
Refills: 0 | Status: COMPLETED | OUTPATIENT
Start: 2020-01-01 | End: 2020-01-01

## 2020-01-01 RX ORDER — ENOXAPARIN SODIUM 100 MG/ML
75 INJECTION SUBCUTANEOUS ONCE
Refills: 0 | Status: COMPLETED | OUTPATIENT
Start: 2020-01-01 | End: 2020-01-01

## 2020-01-01 RX ORDER — APIXABAN 2.5 MG/1
0 TABLET, FILM COATED ORAL
Qty: 0 | Refills: 0 | DISCHARGE

## 2020-01-01 RX ADMIN — Medication 250 MILLIGRAM(S): at 06:07

## 2020-01-01 RX ADMIN — MORPHINE SULFATE 2 MILLIGRAM(S): 50 CAPSULE, EXTENDED RELEASE ORAL at 04:24

## 2020-01-01 RX ADMIN — Medication 2: at 05:14

## 2020-01-01 RX ADMIN — ATENOLOL 25 MILLIGRAM(S): 25 TABLET ORAL at 06:32

## 2020-01-01 RX ADMIN — ENOXAPARIN SODIUM 70 MILLIGRAM(S): 100 INJECTION SUBCUTANEOUS at 20:58

## 2020-01-01 RX ADMIN — MEROPENEM 100 MILLIGRAM(S): 1 INJECTION INTRAVENOUS at 14:33

## 2020-01-01 RX ADMIN — ENOXAPARIN SODIUM 70 MILLIGRAM(S): 100 INJECTION SUBCUTANEOUS at 11:53

## 2020-01-01 RX ADMIN — Medication 650 MILLIGRAM(S): at 09:30

## 2020-01-01 RX ADMIN — Medication 6 MILLIGRAM(S): at 05:51

## 2020-01-01 RX ADMIN — Medication 2: at 07:39

## 2020-01-01 RX ADMIN — ATENOLOL 25 MILLIGRAM(S): 25 TABLET ORAL at 11:53

## 2020-01-01 RX ADMIN — PANTOPRAZOLE SODIUM 40 MILLIGRAM(S): 20 TABLET, DELAYED RELEASE ORAL at 05:07

## 2020-01-01 RX ADMIN — Medication 4: at 12:44

## 2020-01-01 RX ADMIN — Medication 5 MILLILITER(S): at 12:52

## 2020-01-01 RX ADMIN — Medication 5 MILLILITER(S): at 04:25

## 2020-01-01 RX ADMIN — Medication 6 MILLIGRAM(S): at 05:08

## 2020-01-01 RX ADMIN — Medication 6 MILLIGRAM(S): at 06:02

## 2020-01-01 RX ADMIN — ENOXAPARIN SODIUM 75 MILLIGRAM(S): 100 INJECTION SUBCUTANEOUS at 22:54

## 2020-01-01 RX ADMIN — Medication 5 MILLILITER(S): at 22:26

## 2020-01-01 RX ADMIN — POLYETHYLENE GLYCOL 3350 17 GRAM(S): 17 POWDER, FOR SOLUTION ORAL at 11:46

## 2020-01-01 RX ADMIN — Medication 5 MILLILITER(S): at 22:24

## 2020-01-01 RX ADMIN — Medication 0.25 MILLIGRAM(S): at 02:07

## 2020-01-01 RX ADMIN — Medication 110 MILLIGRAM(S): at 06:05

## 2020-01-01 RX ADMIN — REMDESIVIR 500 MILLIGRAM(S): 5 INJECTION INTRAVENOUS at 05:51

## 2020-01-01 RX ADMIN — Medication 110 MILLIGRAM(S): at 17:49

## 2020-01-01 RX ADMIN — ENOXAPARIN SODIUM 70 MILLIGRAM(S): 100 INJECTION SUBCUTANEOUS at 11:50

## 2020-01-01 RX ADMIN — Medication 6 MILLIGRAM(S): at 05:47

## 2020-01-01 RX ADMIN — Medication 110 MILLIGRAM(S): at 11:40

## 2020-01-01 RX ADMIN — Medication 5 MILLILITER(S): at 11:04

## 2020-01-01 RX ADMIN — Medication 975 MILLIGRAM(S): at 03:00

## 2020-01-01 RX ADMIN — Medication 4: at 11:58

## 2020-01-01 RX ADMIN — Medication 5 MILLILITER(S): at 23:00

## 2020-01-01 RX ADMIN — Medication 5 MILLILITER(S): at 05:49

## 2020-01-01 RX ADMIN — ENOXAPARIN SODIUM 70 MILLIGRAM(S): 100 INJECTION SUBCUTANEOUS at 06:31

## 2020-01-01 RX ADMIN — Medication 100 MILLIGRAM(S): at 12:54

## 2020-01-01 RX ADMIN — Medication 25 MILLILITER(S): at 09:00

## 2020-01-01 RX ADMIN — PANTOPRAZOLE SODIUM 40 MILLIGRAM(S): 20 TABLET, DELAYED RELEASE ORAL at 05:51

## 2020-01-01 RX ADMIN — PANTOPRAZOLE SODIUM 40 MILLIGRAM(S): 20 TABLET, DELAYED RELEASE ORAL at 06:23

## 2020-01-01 RX ADMIN — Medication 650 MILLIGRAM(S): at 06:18

## 2020-01-01 RX ADMIN — ENOXAPARIN SODIUM 70 MILLIGRAM(S): 100 INJECTION SUBCUTANEOUS at 12:52

## 2020-01-01 RX ADMIN — Medication 2: at 17:14

## 2020-01-01 RX ADMIN — ENOXAPARIN SODIUM 70 MILLIGRAM(S): 100 INJECTION SUBCUTANEOUS at 22:14

## 2020-01-01 RX ADMIN — ENOXAPARIN SODIUM 70 MILLIGRAM(S): 100 INJECTION SUBCUTANEOUS at 23:14

## 2020-01-01 RX ADMIN — Medication 5 MILLILITER(S): at 06:22

## 2020-01-01 RX ADMIN — Medication 2: at 18:34

## 2020-01-01 RX ADMIN — Medication 110 MILLIGRAM(S): at 05:47

## 2020-01-01 RX ADMIN — MEROPENEM 100 MILLIGRAM(S): 1 INJECTION INTRAVENOUS at 05:15

## 2020-01-01 RX ADMIN — ATENOLOL 25 MILLIGRAM(S): 25 TABLET ORAL at 05:26

## 2020-01-01 RX ADMIN — ATENOLOL 25 MILLIGRAM(S): 25 TABLET ORAL at 04:49

## 2020-01-01 RX ADMIN — PANTOPRAZOLE SODIUM 40 MILLIGRAM(S): 20 TABLET, DELAYED RELEASE ORAL at 05:05

## 2020-01-01 RX ADMIN — HYDROMORPHONE HYDROCHLORIDE 2 MILLIGRAM(S): 2 INJECTION INTRAMUSCULAR; INTRAVENOUS; SUBCUTANEOUS at 14:38

## 2020-01-01 RX ADMIN — ATENOLOL 25 MILLIGRAM(S): 25 TABLET ORAL at 05:47

## 2020-01-01 RX ADMIN — ATENOLOL 25 MILLIGRAM(S): 25 TABLET ORAL at 06:07

## 2020-01-01 RX ADMIN — Medication 2: at 11:51

## 2020-01-01 RX ADMIN — Medication 6: at 12:01

## 2020-01-01 RX ADMIN — Medication 4: at 12:09

## 2020-01-01 RX ADMIN — Medication 5 MILLILITER(S): at 06:06

## 2020-01-01 RX ADMIN — ONDANSETRON 4 MILLIGRAM(S): 8 TABLET, FILM COATED ORAL at 04:19

## 2020-01-01 RX ADMIN — Medication 4: at 11:39

## 2020-01-01 RX ADMIN — Medication 5 MILLILITER(S): at 11:46

## 2020-01-01 RX ADMIN — Medication 250 MILLIGRAM(S): at 21:22

## 2020-01-01 RX ADMIN — Medication 2: at 11:49

## 2020-01-01 RX ADMIN — ENOXAPARIN SODIUM 70 MILLIGRAM(S): 100 INJECTION SUBCUTANEOUS at 11:33

## 2020-01-01 RX ADMIN — Medication 5 MILLILITER(S): at 17:02

## 2020-01-01 RX ADMIN — ENOXAPARIN SODIUM 70 MILLIGRAM(S): 100 INJECTION SUBCUTANEOUS at 11:56

## 2020-01-01 RX ADMIN — Medication 5 MILLILITER(S): at 23:14

## 2020-01-01 RX ADMIN — ENOXAPARIN SODIUM 70 MILLIGRAM(S): 100 INJECTION SUBCUTANEOUS at 23:12

## 2020-01-01 RX ADMIN — ENOXAPARIN SODIUM 70 MILLIGRAM(S): 100 INJECTION SUBCUTANEOUS at 11:45

## 2020-01-01 RX ADMIN — SODIUM CHLORIDE 500 MILLILITER(S): 9 INJECTION, SOLUTION INTRAVENOUS at 09:57

## 2020-01-01 RX ADMIN — PANTOPRAZOLE SODIUM 40 MILLIGRAM(S): 20 TABLET, DELAYED RELEASE ORAL at 06:32

## 2020-01-01 RX ADMIN — Medication 5 MILLILITER(S): at 12:30

## 2020-01-01 RX ADMIN — ENOXAPARIN SODIUM 70 MILLIGRAM(S): 100 INJECTION SUBCUTANEOUS at 17:14

## 2020-01-01 RX ADMIN — POLYETHYLENE GLYCOL 3350 17 GRAM(S): 17 POWDER, FOR SOLUTION ORAL at 12:30

## 2020-01-01 RX ADMIN — ENOXAPARIN SODIUM 70 MILLIGRAM(S): 100 INJECTION SUBCUTANEOUS at 18:24

## 2020-01-01 RX ADMIN — REMDESIVIR 500 MILLIGRAM(S): 5 INJECTION INTRAVENOUS at 06:04

## 2020-01-01 RX ADMIN — Medication 6 MILLIGRAM(S): at 04:22

## 2020-01-01 RX ADMIN — CEFTRIAXONE 100 MILLIGRAM(S): 500 INJECTION, POWDER, FOR SOLUTION INTRAMUSCULAR; INTRAVENOUS at 13:40

## 2020-01-01 RX ADMIN — ALBUTEROL 2 PUFF(S): 90 AEROSOL, METERED ORAL at 00:42

## 2020-01-01 RX ADMIN — POLYETHYLENE GLYCOL 3350 17 GRAM(S): 17 POWDER, FOR SOLUTION ORAL at 12:52

## 2020-01-01 RX ADMIN — Medication 5 MILLILITER(S): at 17:04

## 2020-01-01 RX ADMIN — PANTOPRAZOLE SODIUM 40 MILLIGRAM(S): 20 TABLET, DELAYED RELEASE ORAL at 05:15

## 2020-01-01 RX ADMIN — POLYETHYLENE GLYCOL 3350 17 GRAM(S): 17 POWDER, FOR SOLUTION ORAL at 11:45

## 2020-01-01 RX ADMIN — Medication 5 MILLILITER(S): at 23:02

## 2020-01-01 RX ADMIN — ATENOLOL 25 MILLIGRAM(S): 25 TABLET ORAL at 06:02

## 2020-01-01 RX ADMIN — Medication 60 MILLIGRAM(S): at 18:42

## 2020-01-01 RX ADMIN — ENOXAPARIN SODIUM 70 MILLIGRAM(S): 100 INJECTION SUBCUTANEOUS at 12:10

## 2020-01-01 RX ADMIN — ENOXAPARIN SODIUM 70 MILLIGRAM(S): 100 INJECTION SUBCUTANEOUS at 12:43

## 2020-01-01 RX ADMIN — Medication 6 MILLIGRAM(S): at 08:59

## 2020-01-01 RX ADMIN — REMDESIVIR 500 MILLIGRAM(S): 5 INJECTION INTRAVENOUS at 06:19

## 2020-01-01 RX ADMIN — Medication 2: at 17:42

## 2020-01-01 RX ADMIN — ATENOLOL 25 MILLIGRAM(S): 25 TABLET ORAL at 06:06

## 2020-01-01 RX ADMIN — MORPHINE SULFATE 2 MILLIGRAM(S): 50 CAPSULE, EXTENDED RELEASE ORAL at 04:00

## 2020-01-01 RX ADMIN — Medication 2: at 17:00

## 2020-01-01 RX ADMIN — Medication 5 MILLILITER(S): at 06:31

## 2020-01-01 RX ADMIN — POLYETHYLENE GLYCOL 3350 17 GRAM(S): 17 POWDER, FOR SOLUTION ORAL at 11:04

## 2020-01-01 RX ADMIN — ENOXAPARIN SODIUM 70 MILLIGRAM(S): 100 INJECTION SUBCUTANEOUS at 21:42

## 2020-01-01 RX ADMIN — PANTOPRAZOLE SODIUM 40 MILLIGRAM(S): 20 TABLET, DELAYED RELEASE ORAL at 05:03

## 2020-01-01 RX ADMIN — ATENOLOL 25 MILLIGRAM(S): 25 TABLET ORAL at 05:51

## 2020-01-01 RX ADMIN — ALBUTEROL 2 PUFF(S): 90 AEROSOL, METERED ORAL at 00:07

## 2020-01-01 RX ADMIN — Medication 2: at 07:55

## 2020-01-01 RX ADMIN — Medication 5 MILLILITER(S): at 05:05

## 2020-01-01 RX ADMIN — INSULIN HUMAN 10 UNIT(S): 100 INJECTION, SOLUTION SUBCUTANEOUS at 09:00

## 2020-01-01 RX ADMIN — Medication 2: at 12:29

## 2020-01-01 RX ADMIN — Medication 650 MILLIGRAM(S): at 06:40

## 2020-01-01 RX ADMIN — ENOXAPARIN SODIUM 70 MILLIGRAM(S): 100 INJECTION SUBCUTANEOUS at 10:26

## 2020-01-01 RX ADMIN — ENOXAPARIN SODIUM 70 MILLIGRAM(S): 100 INJECTION SUBCUTANEOUS at 21:24

## 2020-01-01 RX ADMIN — ATENOLOL 25 MILLIGRAM(S): 25 TABLET ORAL at 05:15

## 2020-01-01 RX ADMIN — MEROPENEM 100 MILLIGRAM(S): 1 INJECTION INTRAVENOUS at 19:28

## 2020-01-01 RX ADMIN — ENOXAPARIN SODIUM 70 MILLIGRAM(S): 100 INJECTION SUBCUTANEOUS at 11:40

## 2020-01-01 RX ADMIN — PANTOPRAZOLE SODIUM 40 MILLIGRAM(S): 20 TABLET, DELAYED RELEASE ORAL at 06:19

## 2020-01-01 RX ADMIN — ENOXAPARIN SODIUM 70 MILLIGRAM(S): 100 INJECTION SUBCUTANEOUS at 11:58

## 2020-01-01 RX ADMIN — ATENOLOL 25 MILLIGRAM(S): 25 TABLET ORAL at 06:23

## 2020-01-01 RX ADMIN — Medication 110 MILLIGRAM(S): at 17:11

## 2020-01-01 RX ADMIN — SODIUM ZIRCONIUM CYCLOSILICATE 5 GRAM(S): 10 POWDER, FOR SUSPENSION ORAL at 11:49

## 2020-01-01 RX ADMIN — ENOXAPARIN SODIUM 70 MILLIGRAM(S): 100 INJECTION SUBCUTANEOUS at 05:48

## 2020-01-01 RX ADMIN — Medication 5 MILLILITER(S): at 17:42

## 2020-01-01 RX ADMIN — Medication 4: at 11:56

## 2020-01-01 RX ADMIN — ENOXAPARIN SODIUM 70 MILLIGRAM(S): 100 INJECTION SUBCUTANEOUS at 22:15

## 2020-01-01 RX ADMIN — REMDESIVIR 500 MILLIGRAM(S): 5 INJECTION INTRAVENOUS at 05:25

## 2020-01-01 RX ADMIN — PANTOPRAZOLE SODIUM 40 MILLIGRAM(S): 20 TABLET, DELAYED RELEASE ORAL at 06:31

## 2020-01-01 RX ADMIN — Medication 5 MILLILITER(S): at 17:37

## 2020-01-01 RX ADMIN — CEFTRIAXONE 100 MILLIGRAM(S): 500 INJECTION, POWDER, FOR SOLUTION INTRAMUSCULAR; INTRAVENOUS at 13:11

## 2020-01-01 RX ADMIN — Medication 0.5 MILLIGRAM(S): at 07:55

## 2020-01-01 RX ADMIN — ATENOLOL 25 MILLIGRAM(S): 25 TABLET ORAL at 05:50

## 2020-01-01 RX ADMIN — Medication 12 MILLIGRAM(S): at 22:53

## 2020-01-01 RX ADMIN — TOCILIZUMAB 100 MILLIGRAM(S): 20 INJECTION, SOLUTION, CONCENTRATE INTRAVENOUS at 18:24

## 2020-01-01 RX ADMIN — REMDESIVIR 500 MILLIGRAM(S): 5 INJECTION INTRAVENOUS at 05:27

## 2020-01-01 RX ADMIN — ENOXAPARIN SODIUM 70 MILLIGRAM(S): 100 INJECTION SUBCUTANEOUS at 23:02

## 2020-01-01 RX ADMIN — CASPOFUNGIN ACETATE 260 MILLIGRAM(S): 7 INJECTION, POWDER, LYOPHILIZED, FOR SOLUTION INTRAVENOUS at 12:52

## 2020-01-01 RX ADMIN — Medication 2: at 17:40

## 2020-01-01 RX ADMIN — ENOXAPARIN SODIUM 70 MILLIGRAM(S): 100 INJECTION SUBCUTANEOUS at 12:21

## 2020-01-01 RX ADMIN — Medication 2: at 12:20

## 2020-01-01 RX ADMIN — PANTOPRAZOLE SODIUM 40 MILLIGRAM(S): 20 TABLET, DELAYED RELEASE ORAL at 05:48

## 2020-01-01 RX ADMIN — Medication 5 MILLILITER(S): at 23:01

## 2020-01-01 RX ADMIN — Medication 5 MILLILITER(S): at 12:01

## 2020-01-01 RX ADMIN — Medication 250 MILLIGRAM(S): at 05:46

## 2020-01-01 RX ADMIN — Medication 2: at 11:33

## 2020-01-01 RX ADMIN — ATENOLOL 25 MILLIGRAM(S): 25 TABLET ORAL at 06:31

## 2020-01-01 RX ADMIN — Medication 650 MILLIGRAM(S): at 10:00

## 2020-01-01 RX ADMIN — Medication 4: at 13:27

## 2020-01-01 RX ADMIN — Medication 40 MILLIGRAM(S): at 15:58

## 2020-01-01 RX ADMIN — Medication 110 MILLIGRAM(S): at 04:22

## 2020-01-01 RX ADMIN — Medication 5 MILLILITER(S): at 18:42

## 2020-01-01 RX ADMIN — Medication 6 MILLIGRAM(S): at 06:05

## 2020-01-01 RX ADMIN — Medication 5 MILLILITER(S): at 11:58

## 2020-01-01 RX ADMIN — Medication 250 MILLIGRAM(S): at 17:13

## 2020-01-01 RX ADMIN — Medication 250 MILLIGRAM(S): at 18:24

## 2020-01-01 RX ADMIN — Medication 2: at 09:00

## 2020-01-01 RX ADMIN — ENOXAPARIN SODIUM 70 MILLIGRAM(S): 100 INJECTION SUBCUTANEOUS at 21:27

## 2020-01-01 RX ADMIN — ENOXAPARIN SODIUM 70 MILLIGRAM(S): 100 INJECTION SUBCUTANEOUS at 22:44

## 2020-01-01 RX ADMIN — PANTOPRAZOLE SODIUM 40 MILLIGRAM(S): 20 TABLET, DELAYED RELEASE ORAL at 12:01

## 2020-01-01 RX ADMIN — Medication 5 MILLILITER(S): at 00:24

## 2020-01-01 RX ADMIN — Medication 4: at 11:46

## 2020-01-01 RX ADMIN — Medication 5 MILLILITER(S): at 05:03

## 2020-01-01 RX ADMIN — REMDESIVIR 500 MILLIGRAM(S): 5 INJECTION INTRAVENOUS at 05:46

## 2020-01-01 RX ADMIN — ENOXAPARIN SODIUM 70 MILLIGRAM(S): 100 INJECTION SUBCUTANEOUS at 22:26

## 2020-01-01 RX ADMIN — Medication 975 MILLIGRAM(S): at 22:04

## 2020-01-01 RX ADMIN — Medication 6 MILLIGRAM(S): at 05:50

## 2020-01-01 RX ADMIN — Medication 6 MILLIGRAM(S): at 05:15

## 2020-01-01 RX ADMIN — PANTOPRAZOLE SODIUM 40 MILLIGRAM(S): 20 TABLET, DELAYED RELEASE ORAL at 06:02

## 2020-01-01 RX ADMIN — Medication 60 MILLIGRAM(S): at 05:03

## 2020-01-01 RX ADMIN — ENOXAPARIN SODIUM 70 MILLIGRAM(S): 100 INJECTION SUBCUTANEOUS at 12:30

## 2020-01-01 RX ADMIN — ATENOLOL 25 MILLIGRAM(S): 25 TABLET ORAL at 05:02

## 2020-01-01 RX ADMIN — PANTOPRAZOLE SODIUM 40 MILLIGRAM(S): 20 TABLET, DELAYED RELEASE ORAL at 04:24

## 2020-01-01 RX ADMIN — ATENOLOL 25 MILLIGRAM(S): 25 TABLET ORAL at 04:24

## 2020-01-01 RX ADMIN — REMDESIVIR 500 MILLIGRAM(S): 5 INJECTION INTRAVENOUS at 03:28

## 2020-01-01 RX ADMIN — ATENOLOL 25 MILLIGRAM(S): 25 TABLET ORAL at 05:05

## 2020-01-01 RX ADMIN — ATENOLOL 25 MILLIGRAM(S): 25 TABLET ORAL at 05:08

## 2020-01-01 RX ADMIN — Medication 10: at 12:31

## 2020-01-01 RX ADMIN — REMDESIVIR 500 MILLIGRAM(S): 5 INJECTION INTRAVENOUS at 06:02

## 2020-01-01 RX ADMIN — HYDROMORPHONE HYDROCHLORIDE 2 MILLIGRAM(S): 2 INJECTION INTRAMUSCULAR; INTRAVENOUS; SUBCUTANEOUS at 15:05

## 2020-01-01 RX ADMIN — REMDESIVIR 500 MILLIGRAM(S): 5 INJECTION INTRAVENOUS at 05:52

## 2020-01-01 RX ADMIN — Medication 6 MILLIGRAM(S): at 06:20

## 2020-01-01 RX ADMIN — ENOXAPARIN SODIUM 70 MILLIGRAM(S): 100 INJECTION SUBCUTANEOUS at 23:40

## 2020-01-01 RX ADMIN — ENOXAPARIN SODIUM 70 MILLIGRAM(S): 100 INJECTION SUBCUTANEOUS at 12:01

## 2020-01-01 RX ADMIN — Medication 4: at 17:37

## 2020-01-01 RX ADMIN — ENOXAPARIN SODIUM 70 MILLIGRAM(S): 100 INJECTION SUBCUTANEOUS at 21:53

## 2020-01-01 RX ADMIN — CEFTRIAXONE 100 MILLIGRAM(S): 500 INJECTION, POWDER, FOR SOLUTION INTRAMUSCULAR; INTRAVENOUS at 13:01

## 2020-01-01 RX ADMIN — PANTOPRAZOLE SODIUM 40 MILLIGRAM(S): 20 TABLET, DELAYED RELEASE ORAL at 05:26

## 2020-01-01 RX ADMIN — ENOXAPARIN SODIUM 70 MILLIGRAM(S): 100 INJECTION SUBCUTANEOUS at 12:20

## 2020-01-01 RX ADMIN — ATENOLOL 25 MILLIGRAM(S): 25 TABLET ORAL at 06:19

## 2020-01-01 RX ADMIN — Medication 6 MILLIGRAM(S): at 05:27

## 2020-01-01 RX ADMIN — Medication 4: at 07:40

## 2020-01-01 RX ADMIN — POLYETHYLENE GLYCOL 3350 17 GRAM(S): 17 POWDER, FOR SOLUTION ORAL at 12:02

## 2020-01-01 RX ADMIN — PANTOPRAZOLE SODIUM 40 MILLIGRAM(S): 20 TABLET, DELAYED RELEASE ORAL at 04:50

## 2020-01-01 RX ADMIN — ENOXAPARIN SODIUM 70 MILLIGRAM(S): 100 INJECTION SUBCUTANEOUS at 21:23

## 2020-01-01 RX ADMIN — PANTOPRAZOLE SODIUM 40 MILLIGRAM(S): 20 TABLET, DELAYED RELEASE ORAL at 06:06

## 2020-01-01 RX ADMIN — REMDESIVIR 500 MILLIGRAM(S): 5 INJECTION INTRAVENOUS at 06:16

## 2020-01-01 RX ADMIN — ENOXAPARIN SODIUM 70 MILLIGRAM(S): 100 INJECTION SUBCUTANEOUS at 22:11

## 2020-08-04 NOTE — CONSULT NOTE ADULT - SUBJECTIVE AND OBJECTIVE BOX
Patient is a 80y old  Male who presents with a chief complaint of progressive SOB x 10 days. Pt was diagnosed COVID positive 10 days ago w/ progressive SOB w/ fever. CXR shows bilateral   infiltrates.           PAST MEDICAL & SURGICAL HISTORY:  Bradycardia  Hypertension  Artificial pacemaker    Allergies    No Known Allergies    Intolerances      FAMILY HISTORY:    Home Medications:  atenolol 25 mg oral tablet: 1 tab(s) orally once a day (04 Aug 2020 21:58)  doxycycline hyclate 100 mg oral capsule: 1 cap(s) orally 2 times a day (04 Aug 2020 21:58)  Eliquis 5 mg oral tablet:  (04 Aug 2020 21:58)  pantoprazole 40 mg oral delayed release tablet:  (04 Aug 2020 21:58)  Vitamin D3:  (04 Aug 2020 21:58)    Occupation:  Alochol: Denied  Smoking: Denied  Drug Use: Denied  Marital Status:         ROS: as in HPI; All other systems reviewed are negative    ICU Vital Signs Last 24 Hrs  T(C): 37.2 (04 Aug 2020 22:57), Max: 39.7 (04 Aug 2020 20:34)  T(F): 98.9 (04 Aug 2020 22:57), Max: 103.4 (04 Aug 2020 20:34)  HR: 101 (04 Aug 2020 22:57) (98 - 115)  BP: 101/69 (04 Aug 2020 22:57) (101/65 - 128/69)  BP(mean): --  ABP: --  ABP(mean): --  RR: 20 (04 Aug 2020 22:57) (20 - 40)  SpO2: 97% (04 Aug 2020 22:57) (97% - 98%)        Physical Examination:    General: elderly W male pt 's on hi flow O2 saturating in 92% , in mild dyspnea    HEENT: Pupils equal, reactive to light.  Symmetric.    PULM: diminished br so bilaterally, no significant sputum production    CVS: Regular rate and rhythm, no murmurs, rubs, or gallops    ABD: Soft, nondistended, nontender, normoactive bowel sounds, no masses    EXT: No edema, nontender    SKIN: Warm and well perfused, no rashes noted.          ABG - ( 04 Aug 2020 21:22 )  pH, Arterial: 7.46  pH, Blood: x     /  pCO2: 28    /  pO2: 142   / HCO3: 20    / Base Excess: -2.3  /  SaO2: 99                  I&O's Detail        LABS:                        14.0   17.32 )-----------( 308      ( 04 Aug 2020 21:00 )             42.2     04 Aug 2020 21:00    137    |  106    |  18     ----------------------------<  186    5.0     |  18     |  1.0      Ca    8.5        04 Aug 2020 21:00    TPro  6.8    /  Alb  2.7    /  TBili  0.7    /  DBili  x      /  AST  51     /  ALT  50     /  AlkPhos  89     04 Aug 2020 21:00  Amylase x     lipase x              CAPILLARY BLOOD GLUCOSE            Culture        MEDICATIONS  (STANDING):    MEDICATIONS  (PRN):        RADIOLOGY: ***     CXR bilat. patchy pneumonia          IMPRESSION:  COVID-19 infection      PLAN:  Discussed w/ ICU MD Dhaliwal, plan to transfer to ICU @ [facility].   start- IV steroids, full dose anticoag.   convalesant plasma, remdesivir         CRITICAL CARE TIME SPENT: ***

## 2020-08-04 NOTE — ED PROVIDER NOTE - CLINICAL SUMMARY MEDICAL DECISION MAKING FREE TEXT BOX
Patient doing much better after defervescence, high flow O2, Xray with extensive disease -- seen by ICU, accepted for admission. Received full dose lovenox, dex. Will admit to MICU at [place] Patient doing much better after defervescence, high flow O2, Xray with extensive disease -- seen by ICU, accepted for admission. Received full dose lovenox, dex. Will admit to MICU at [facility]

## 2020-08-04 NOTE — ED PROVIDER NOTE - ATTENDING CONTRIBUTION TO CARE
80 y M history of ppm placement, diagnosed with Covid on Friday July 25 after traveling to [place], [place] early July, here for assessment of acutely worsening dyspnea, elevated temp, persistent cough over the last 4 days.     Patient arrived hypoxic with RA O2 69%, RR 26, , T 103.4  Mild to moderate respiratory distress, speaking in full sentences, coarse crackles, tachy with regular rhythm, soft, NT, ND abdomen, no rash.    Patient placed on high flow O2 with improvement of sat, received rectal acetaminophen.  Labs notable for WBC 17, D dimer 14k, normal Cr, renal function.    Will consult ICU, will need transfer to [facility] for admission 80 y M history of ppm placement, diagnosed with Covid on Friday July 25 after traveling to [place], [place] early July, here for assessment of acutely worsening dyspnea, elevated temp, persistent cough over the last 4 days.     Patient arrived hypoxic with RA O2 69%, RR 26, , T 103.4  Mild to moderate respiratory distress, speaking in full sentences, coarse crackles, tachy with regular rhythm, soft, NT, ND abdomen, no rash.    Patient placed on high flow O2 with improvement of sat, received rectal acetaminophen.  Labs notable for WBC 17, D dimer 14k, normal Cr, liver function.    Will consult ICU, will need transfer to [facility] for admission 80 y M history of ppm placement, diagnosed with Covid on Sat July 25 after traveling to [place], [place] early July, here for assessment of acutely worsening dyspnea, elevated temp, persistent cough over the last 4 days.     Patient arrived hypoxic with RA O2 69%, RR 26, , T 103.4  Mild to moderate respiratory distress, speaking in full sentences, coarse crackles, tachy with regular rhythm, soft, NT, ND abdomen, no rash.    Patient placed on high flow O2 with improvement of sat, received rectal acetaminophen.  Labs notable for WBC 17, D dimer 14k, normal Cr, liver function.    Will consult ICU, will need transfer to [facility] for admission

## 2020-08-04 NOTE — ED PROVIDER NOTE - PHYSICAL EXAMINATION
I am unable to obtain a comprehensive history, review of systems, past medical history, and/or physical exam due to constraints imposed by the urgency of the patient's clinical condition and/or mental status.     Physical Exam    Vital Signs: I have reviewed the initial vital signs.  Constitutional: well-nourished, appears stated age, Severe Resp acute distress  Eyes: Conjunctiva pink, Sclera clear, PERRLA, EOMI.  Cardiovascular: S1 and S2, Tachy regular rate, regular rhythm, well-perfused extremities, radial pulses equal and 2+  Respiratory: +tachypnea with decrease breath sounds and crackles b/l Resp distress  Gastrointestinal: soft, non-tender abdomen, no pulsatile mass, normal bowl sounds  Musculoskeletal: supple neck, no lower extremity edema, no midline tenderness  Integumentary: warm, dry, no rash  Neurologic: awake, alert, cranial nerves II-XII grossly intact, extremities’ motor and sensory functions grossly intact

## 2020-08-04 NOTE — ED ADULT NURSE NOTE - PMH
Yuliya Granados, a  at 26w0d with an ANNA of 2017, by Last Menstrual Period, was seen at [facility] LABOR DELIVERY for a nonstress test.    Chief Complaint   Patient presents with   • Abdominal Pain     pt c/o upper r quadrant pain for the last few days, pt states she has been swollen for the past 4 days. pt denies any vaginal bleeding or leaking of fluids. pt reports positive fetal movement.                Reactive   Bradycardia    Hypertension

## 2020-08-04 NOTE — ED PROVIDER NOTE - CRITICAL CARE PROVIDED
consult w/ pt's family directly relating to pts condition/interpretation of diagnostic studies/consultation with other physicians/telephone consultation with the patient's family/direct patient care (not related to procedure)

## 2020-08-05 NOTE — CONSULT NOTE ADULT - ASSESSMENT
IMPRESSION:    Acute Hypoxemic Respiratory Failure 2/2 COVID-19 PNA  Elevated D-Dimer- R/O VTE On Lovenox   S/P PPM    PLAN:    CNS: Avoid CNS depressants.      HEENT: Oral care    PULMONARY: HFNC to keep sat >94%. Solumedrol 40 q12 HOB @ 45 degrees. Aspiration Precautions . CXR in am     CARDIOVASCULAR: CE x 1. BNP level. Avoid volume overload . Keep I O    GI: GI prophylaxis.  Feeding NPO    RENAL:  Follow up lytes.  Correct as needed    INFECTIOUS DISEASE:  Procalcitonin, Fibrinogen . Follow up cultures. ABX : Rocephin and Doxycyline. Remdesivir as per ID.Urine legionella and urine strep. Nasal MRSA. Covid-19 Abs.    HEMATOLOGICAL:  Lovenox q12, B/L Vascular venous LE duplex     ENDOCRINE:  Follow up FS.  Insulin protocol if needed.    MUSCULOSKELETAL: Bedrest    Saleem: No  Lines: Peripheral IV  Code status: Full code  Disposition: MICU monitoring  Plan of care d/w patient IMPRESSION:    Acute Hypoxemic Respiratory Failure   Sepsis present on admission   COVID-19 PNA  S/P PPM    PLAN:    CNS: Avoid CNS depressants.      HEENT: Oral care    PULMONARY: keep sat >88%.   HOB @ 45 degrees. Aspiration Precautions . CXR today.  HFNCO2 for now.      CARDIOVASCULAR: CE x 1. BNP level. Avoid volume overload . Keep I = O.  ECHO     GI: GI prophylaxis.  NPO for today     RENAL:  Follow up lytes.  Correct as needed.  Lokelma.  Lytes Q12     INFECTIOUS DISEASE:  Procalcitonin, Fibrinogen, Ferritin, ESR, CRP, Follow up cultures. ABX : Rocephin, VAnc, and Doxycyline. Nasal MRSA.  Remdesivir and TOCI.  Urine legionella and urine strep. Covid-19 Abs.  Repeat CIVID PCR.  Might benefit from plasma     HEMATOLOGICAL:  Lovenox q12, B/L Vascular venous LE duplex     ENDOCRINE:  Follow up FS.  Insulin protocol if needed.    MUSCULOSKELETAL: Bedrest    Saleem: No  Lines: Peripheral IV  Code status: Full code  Disposition: CEU   Plan of care d/w patient

## 2020-08-05 NOTE — CONSULT NOTE ADULT - ASSESSMENT
ASSESSMENT  80y M HTN, HLD, CAD?, conduction abnormality sp Dual chamber pacemaker admitted with COVID, tested + as an outpatient about 1 week prior.       IMPRESSION  # COVID-19 PNA with sepsis on admission    CXR bilateral airspace opacities     WBC 17 on admission  #Cytokine Release Syndrome   D-Dimer Assay, Quantitative: 00521 ng/mL DDU (08-04-20 @ 21:00)  #Transaminitis     RECOMMENDATIONS  - D/C VANC if MRSA PCR NEGATIVE   - if procalcitonin unremarkable then D/C antibiotics  - Remdesivir 200mg x1 then 100mg daily x 5 days pending clinical course. Monitor Cr, LFTS daily  - Decadron 6mg daily IV  - Tocilizumab 400mg x1, and recheck d-dimer, crp, ferritin in 48h   - Will consent for convalescent plasma, needs confirmatory T&S  - Therapeutic A/C  - Recommend prone positioning if possible     If any questions, please call or send a message on [employer] Teams  Spectra [phone]

## 2020-08-05 NOTE — H&P ADULT - NSHPPHYSICALEXAM_GEN_ALL_CORE
General: A/ox 3, Fatigued  Neck: Supple, NO JVD  Cardiac: S1 S2 heard regular, No audible murmurs  Pulmonary: Bilateral midlung field to base of lung dry crackles, no wheezing.   Abdomen: Soft, Non -tender, +BS   Extremities: No Rashes, No edema  Neuro: A/o x 3, No focal deficits  Psch: normal mood , normal affect

## 2020-08-05 NOTE — H&P ADULT - ATTENDING COMMENTS
80 year old male admitted for Acute hypoxic respiratory failure secondary to the Viral pneumonia secondary to the COVID - 19.   COVID positive inhouse and out patient also.   Currently requiring Oxygen via NRBM. In respiratory failure . At ICU care now  Remdesivir and Dexamethasone   ID to follow

## 2020-08-05 NOTE — H&P ADULT - HISTORY OF PRESENT ILLNESS
80 year old male patient, transferred from [facility] for COVID-19 LRTI.    Known hypertensive, dyslipidemic, type II diabetic, CAD?, conduction abnormality sp Dual chamber pacemaker.     Current history goes back to 8 days ago when the patient began experiencing fever, cough and worsening dyspnea at rest.   Patient diagnosed with COVID-19 LRTI as out patient. Presented 1 day ptp to [facility] for worsening dyspnea, admitted to [facility] for further management.

## 2020-08-05 NOTE — CONSULT NOTE ADULT - SUBJECTIVE AND OBJECTIVE BOX
JOURDAN, ISMET  80y, Male  Allergy: No Known Allergies      CHIEF COMPLAINT: COVID PNEUMONIA (05 Aug 2020 08:30)      LOS  1d    HPI:  80 year old male patient, transferred from [facility] for COVID-19.    Known hypertensive, dyslipidemic, type II diabetic, CAD?, conduction abnormality sp Dual chamber pacemaker.     Current history goes back to 8 days ago when the patient began experiencing fever, cough and worsening dyspnea at rest.   Patient diagnosed with COVID-19 LRTI as out patient. Presented 1 day ptp to [facility] for worsening dyspnea, admitted to [facility] for further management. (05 Aug 2020 03:14)      INFECTIOUS DISEASE HISTORY:  Currently reports cough  initial PCR NEGATIVE now POSITIVE  outpatient test POSITIVE    PAST MEDICAL & SURGICAL HISTORY:  Bradycardia  Hypertension  Artificial pacemaker      FAMILY HISTORY  non-contributory     SOCIAL HISTORY  Social History:        ROS  General: Denies rigors, nightsweats  HEENT: Denies headache, rhinorrhea, sore throat, eye pain  CV: Denies CP, palpitations  PULM: as noted above   GI: Denies hematemesis, hematochezia, melena  : Denies discharge, hematuria  MSK: Denies arthralgias, myalgias  SKIN: Denies rash, lesions  NEURO: Denies paresthesias, weakness  PSYCH: Denies depression, anxiety     VITALS:  T(F): 97.1, Max: 103.4 (08-04-20 @ 20:34)  HR: 75  BP: 105/62  RR: 17Vital Signs Last 24 Hrs  T(C): 36.2 (05 Aug 2020 08:00), Max: 39.7 (04 Aug 2020 20:34)  T(F): 97.1 (05 Aug 2020 08:00), Max: 103.4 (04 Aug 2020 20:34)  HR: 75 (05 Aug 2020 12:00) (72 - 115)  BP: 105/62 (05 Aug 2020 12:00) (90/51 - 128/69)  BP(mean): --  RR: 17 (05 Aug 2020 12:00) (17 - 40)  SpO2: 94% (05 Aug 2020 12:00) (94% - 100%)    PHYSICAL EXAM:  Gen: NRB  HEENT: Normocephalic, atraumatic  Neck: supple, no lymphadenopathy  CV: Regular rate & regular rhythm  Lungs: decreased BS at bases, no fremitus  Abdomen: Soft, BS present  Ext: Warm, well perfused  Neuro: non focal, awake  Skin: no rash, no erythema  Lines: no phlebitis     TESTS & MEASUREMENTS:                        13.5   11.99 )-----------( 254      ( 05 Aug 2020 05:24 )             41.8     08-05    139  |  108  |  25<H>  ----------------------------<  186<H>  4.4   |  19  |  0.9    Ca    8.5      05 Aug 2020 11:13    TPro  6.4  /  Alb  2.6<L>  /  TBili  0.5  /  DBili  0.2  /  AST  49<H>  /  ALT  43<H>  /  AlkPhos  80  08-05    eGFR if Non African American: 82 mL/min/1.73M2 (08-05-20 @ 11:13)  eGFR if : 94 mL/min/1.73M2 (08-05-20 @ 11:13)  eGFR if Non African American: 72 mL/min/1.73M2 (08-05-20 @ 07:00)  eGFR if : 83 mL/min/1.73M2 (08-05-20 @ 07:00)  eGFR if Non African American: 72 mL/min/1.73M2 (08-05-20 @ 05:24)  eGFR if : 83 mL/min/1.73M2 (08-05-20 @ 05:24)  eGFR if Non African American: 72 mL/min/1.73M2 (08-04-20 @ 21:00)  eGFR if : 83 mL/min/1.73M2 (08-04-20 @ 21:00)    LIVER FUNCTIONS - ( 05 Aug 2020 07:00 )  Alb: 2.6 g/dL / Pro: 6.4 g/dL / ALK PHOS: 80 U/L / ALT: 43 U/L / AST: 49 U/L / GGT: x                 Lactate, Blood: 2.2 mmol/L (08-05-20 @ 11:13)      INFECTIOUS DISEASES TESTING  COVID-19 PCR: Detected (08-05-20 @ 09:56)  COVID-19 PCR: NotDetec (08-04-20 @ 21:00)      RADIOLOGY & ADDITIONAL TESTS:  I have personally reviewed the last Chest xray  CXR      CT      CARDIOLOGY TESTING  12 Lead ECG:   Ventricular Rate 118 BPM    Atrial Rate 118 BPM    P-R Interval 126 ms    QRS Duration 90 ms    Q-T Interval 324 ms    QTC Calculation(Bezet) 454 ms    P Axis 43 degrees    R Axis 5 degrees    T Axis 18 degrees    Diagnosis Line Sinus tachycardia  Poor R wave progression  Confirmed by BRADEN GENTILE MD [id number]) on 8/5/2020 12:03:30 PM (08-04-20 @ 21:09)      MEDICATIONS  ATENolol  Tablet 25 Oral daily  cefTRIAXone   IVPB     dexAMETHasone  Injectable 6 IV Push daily  dextrose 5%. 1000 IV Continuous <Continuous>  dextrose 50% Injectable 12.5 IV Push once  dextrose 50% Injectable 25 IV Push once  dextrose 50% Injectable 25 IV Push once  doxycycline IVPB     doxycycline IVPB 100 IV Intermittent every 12 hours  enoxaparin Injectable 70 SubCutaneous every 12 hours  insulin lispro (HumaLOG) corrective regimen sliding scale  SubCutaneous three times a day before meals  pantoprazole    Tablet 40 Oral before breakfast  remdesivir  IVPB  IV Intermittent   sodium zirconium cyclosilicate 5 Oral daily  vancomycin  IVPB 1000 IV Intermittent every 12 hours      Weight  Weight (kg): 74.8 (08-04-20 @ 20:34)    ANTIBIOTICS:  cefTRIAXone   IVPB      doxycycline IVPB      doxycycline IVPB 100 milliGRAM(s) IV Intermittent every 12 hours  remdesivir  IVPB   IV Intermittent   vancomycin  IVPB 1000 milliGRAM(s) IV Intermittent every 12 hours      ALLERGIES:  No Known Allergies

## 2020-08-05 NOTE — CONSULT NOTE ADULT - SUBJECTIVE AND OBJECTIVE BOX
Patient is a 80y old  Male who presents with a chief complaint of COVID LRTI (05 Aug 2020 03:14)      HPI:  80 year old male patient, transferred from [facility] for COVID-19 LRTI.Known hypertensive, dyslipidemic, type II diabetic, CAD?, conduction abnormality sp Dual chamber pacemaker.   Current history goes back to 8 days ago when the patient began experiencing fever, cough and worsening dyspnea at rest.   Patient diagnosed with COVID-19 LRTI as out patient. Presented 1 day ptp to [facility] for worsening dyspnea, admitted to [facility] for further management. (05 Aug 2020 03:14)  This morning patient reports improvement in SOB .On NRB sat 94%      PAST MEDICAL & SURGICAL HISTORY:  Bradycardia  Hypertension  Artificial pacemaker      SOCIAL HX:   Smoking denies                         ETOH   denies                         Other    FAMILY HISTORY:  :  No known cardiovascular family hisotry     Review Of Systems:     All ROS are negative except per HPI       Allergies    No Known Allergies    Intolerances          PHYSICAL EXAM    ICU Vital Signs Last 24 Hrs  T(C): 36.7 (05 Aug 2020 05:30), Max: 39.7 (04 Aug 2020 20:34)  T(F): 98.1 (05 Aug 2020 05:30), Max: 103.4 (04 Aug 2020 20:34)  HR: 76 (05 Aug 2020 06:00) (72 - 115)  BP: 98/55 (05 Aug 2020 06:00) (90/51 - 128/69)  BP(mean): --  ABP: --  ABP(mean): --  RR: 20 (05 Aug 2020 06:00) (20 - 40)  SpO2: 100% on NRB (05 Aug 2020 06:00) (95% - 100%)      CONSTITUTIONAL:  Well nourished.   NAD    ENT:   Airway patent,   Mouth with normal mucosa.   No thrush      CARDIAC:   Normal rate,   Regular rhythm.    No edema      Vascular:   normal systolic impulse  no bruits    RESPIRATORY:   No wheezing  b/l crackles   Not tachypneic,  No use of accessory muscles    GASTROINTESTINAL:  Abdomen soft,   Non-tender,   No guarding,   + BS      NEUROLOGICAL:   Alert and oriented   No motor deficits.    SKIN:   Skin normal color for race,   No evidence of rash.              08-04-20 @ 07:01  -  08-05-20 @ 07:00  --------------------------------------------------------  IN:    IV PiggyBack: 250 mL    Oral Fluid: 100 mL  Total IN: 350 mL    OUT:    Voided: 250 mL  Total OUT: 250 mL    Total NET: 100 mL          LABS:                          13.5   11.99 )-----------( 254      ( 05 Aug 2020 05:24 )             41.8                                               08-05             13.5   11.99 )-----------( 254      ( 08-05 @ 05:24 )             41.8                14.0   17.32 )-----------( 308      ( 08-04 @ 21:00 )             42.2       143  |  111<H>  |  22<H>  ----------------------------<  224<H>  5.4<H>   |  20  |  1.0    05 Aug 2020 05:24    143    |  111<H>  |  22<H>  ----------------------------<  224<H>  5.4<H>   |  20     |  1.0    04 Aug 2020 21:00    137    |  106    |  18     ----------------------------<  186<H>  5.0     |  18     |  1.0      Ca    8.7        05 Aug 2020 05:24  Ca    8.5        04 Aug 2020 21:00    TPro  6.3    /  Alb  2.6<L>  /  TBili  0.5    /  DBili  x      /  AST  49<H>  /  ALT  43<H>  /  AlkPhos  78     05 Aug 2020 05:24  TPro  6.8    /  Alb  2.7<L>  /  TBili  0.7    /  DBili  x      /  AST  51<H>  /  ALT  50<H>  /  AlkPhos  89     04 Aug 2020 21:00    Ca    8.7      05 Aug 2020 05:24    TPro  6.3  /  Alb  2.6<L>  /  TBili  0.5  /  DBili  x   /  AST  49<H>  /  ALT  43<H>  /  AlkPhos  78  08-05      PT/INR - ( 05 Aug 2020 05:24 )   PT: 17.60 sec;   INR: 1.53 ratio         PTT - ( 05 Aug 2020 05:24 )  PTT:36.8 sec                                                                                     LIVER FUNCTIONS - ( 05 Aug 2020 05:24 )  Alb: 2.6 g/dL / Pro: 6.3 g/dL / ALK PHOS: 78 U/L / ALT: 43 U/L / AST: 49 U/L / GGT: x                                                                                                                                   ABG - ( 04 Aug 2020 21:22 )  pH, Arterial: 7.46  pH, Blood: x     /  pCO2: 28    /  pO2: 142   / HCO3: 20    / Base Excess: -2.3  /  SaO2: 99                                                                                              ECHO not done    CXR interpreted by me:  B/L Multifocal Consolidation  .PPM +    MEDICATIONS  (STANDING):  ATENolol  Tablet 25 milliGRAM(s) Oral daily  dexAMETHasone  Injectable 6 milliGRAM(s) IV Push daily  dextrose 5%. 1000 milliLiter(s) (50 mL/Hr) IV Continuous <Continuous>  dextrose 50% Injectable 12.5 Gram(s) IV Push once  dextrose 50% Injectable 25 Gram(s) IV Push once  dextrose 50% Injectable 25 Gram(s) IV Push once  dextrose 50% Injectable 25 milliLiter(s) IV Push once  enoxaparin Injectable 70 milliGRAM(s) SubCutaneous every 12 hours  insulin lispro (HumaLOG) corrective regimen sliding scale   SubCutaneous three times a day before meals  insulin regular  human recombinant. 10 Unit(s) IV Push once  pantoprazole    Tablet 40 milliGRAM(s) Oral before breakfast  remdesivir  IVPB   IV Intermittent     MEDICATIONS  (PRN):  dextrose 40% Gel 15 Gram(s) Oral once PRN Blood Glucose LESS THAN 70 milliGRAM(s)/deciliter  glucagon  Injectable 1 milliGRAM(s) IntraMuscular once PRN Glucose LESS THAN 70 milligrams/deciliter Patient is a 80y old  Male who presents with a chief complaint of COVID LRTI (05 Aug 2020 03:14)      HPI:  80 year old male patient, transferred from [facility] for COVID-19 LRTI.Known hypertensive, dyslipidemic, type II diabetic, CAD?, conduction abnormality sp Dual chamber pacemaker.   Current history goes back to 8 days ago when the patient began experiencing fever, cough and worsening dyspnea at rest.   Patient diagnosed with COVID-19 LRTI as out patient. Presented 1 day ptp to [facility] for worsening dyspnea, admitted to [facility] for further management. (05 Aug 2020 03:14)  This morning patient reports improvement in SOB .On NRB sat 94%      PAST MEDICAL & SURGICAL HISTORY:  Bradycardia  Hypertension  Artificial pacemaker      SOCIAL HX:   Smoking denies                         ETOH   denies                         Other    FAMILY HISTORY:  :  No known cardiovascular family hisotry     Review Of Systems:     All ROS are negative except per HPI       Allergies    No Known Allergies    Intolerances          PHYSICAL EXAM    ICU Vital Signs Last 24 Hrs  T(C): 36.7 (05 Aug 2020 05:30), Max: 39.7 (04 Aug 2020 20:34)  T(F): 98.1 (05 Aug 2020 05:30), Max: 103.4 (04 Aug 2020 20:34)  HR: 76 (05 Aug 2020 06:00) (72 - 115)  BP: 98/55 (05 Aug 2020 06:00) (90/51 - 128/69)  BP(mean): --  ABP: --  ABP(mean): --  RR: 20 (05 Aug 2020 06:00) (20 - 40)  SpO2: 100% on NRB (05 Aug 2020 06:00) (95% - 100%)      CONSTITUTIONAL:  Well nourished.   in Mild respiratory distress     ENT:   Airway patent,   Mouth with normal mucosa.   No thrush      CARDIAC:   Normal rate,   Regular rhythm.    No edema      Vascular:   normal systolic impulse  no bruits    RESPIRATORY:   No wheezing  b/l crackles   tachypneic,  No use of accessory muscles  Slight abdominal breathing     GASTROINTESTINAL:  Abdomen soft,   Non-tender,   No guarding,   + BS      NEUROLOGICAL:   Alert and oriented   No motor deficits.    SKIN:   Skin normal color for race,   No evidence of rash.    Psych  No risk to self or other           08-04-20 @ 07:01  -  08-05-20 @ 07:00  --------------------------------------------------------  IN:    IV PiggyBack: 250 mL    Oral Fluid: 100 mL  Total IN: 350 mL    OUT:    Voided: 250 mL  Total OUT: 250 mL    Total NET: 100 mL          LABS:                          13.5   11.99 )-----------( 254      ( 05 Aug 2020 05:24 )             41.8                                               08-05             13.5   11.99 )-----------( 254      ( 08-05 @ 05:24 )             41.8                14.0   17.32 )-----------( 308      ( 08-04 @ 21:00 )             42.2       143  |  111<H>  |  22<H>  ----------------------------<  224<H>  5.4<H>   |  20  |  1.0    05 Aug 2020 05:24    143    |  111<H>  |  22<H>  ----------------------------<  224<H>  5.4<H>   |  20     |  1.0    04 Aug 2020 21:00    137    |  106    |  18     ----------------------------<  186<H>  5.0     |  18     |  1.0      Ca    8.7        05 Aug 2020 05:24  Ca    8.5        04 Aug 2020 21:00    TPro  6.3    /  Alb  2.6<L>  /  TBili  0.5    /  DBili  x      /  AST  49<H>  /  ALT  43<H>  /  AlkPhos  78     05 Aug 2020 05:24  TPro  6.8    /  Alb  2.7<L>  /  TBili  0.7    /  DBili  x      /  AST  51<H>  /  ALT  50<H>  /  AlkPhos  89     04 Aug 2020 21:00    Ca    8.7      05 Aug 2020 05:24    TPro  6.3  /  Alb  2.6<L>  /  TBili  0.5  /  DBili  x   /  AST  49<H>  /  ALT  43<H>  /  AlkPhos  78  08-05      PT/INR - ( 05 Aug 2020 05:24 )   PT: 17.60 sec;   INR: 1.53 ratio         PTT - ( 05 Aug 2020 05:24 )  PTT:36.8 sec                                                                                     LIVER FUNCTIONS - ( 05 Aug 2020 05:24 )  Alb: 2.6 g/dL / Pro: 6.3 g/dL / ALK PHOS: 78 U/L / ALT: 43 U/L / AST: 49 U/L / GGT: x                                                                                                                                   ABG - ( 04 Aug 2020 21:22 )  pH, Arterial: 7.46  pH, Blood: x     /  pCO2: 28    /  pO2: 142   / HCO3: 20    / Base Excess: -2.3  /  SaO2: 99                                                                                              ECHO not done    CXR interpreted by me:  B/L Multifocal Consolidation  .PPM +    MEDICATIONS  (STANDING):  ATENolol  Tablet 25 milliGRAM(s) Oral daily  dexAMETHasone  Injectable 6 milliGRAM(s) IV Push daily  dextrose 5%. 1000 milliLiter(s) (50 mL/Hr) IV Continuous <Continuous>  dextrose 50% Injectable 12.5 Gram(s) IV Push once  dextrose 50% Injectable 25 Gram(s) IV Push once  dextrose 50% Injectable 25 Gram(s) IV Push once  dextrose 50% Injectable 25 milliLiter(s) IV Push once  enoxaparin Injectable 70 milliGRAM(s) SubCutaneous every 12 hours  insulin lispro (HumaLOG) corrective regimen sliding scale   SubCutaneous three times a day before meals  insulin regular  human recombinant. 10 Unit(s) IV Push once  pantoprazole    Tablet 40 milliGRAM(s) Oral before breakfast  remdesivir  IVPB   IV Intermittent     MEDICATIONS  (PRN):  dextrose 40% Gel 15 Gram(s) Oral once PRN Blood Glucose LESS THAN 70 milliGRAM(s)/deciliter  glucagon  Injectable 1 milliGRAM(s) IntraMuscular once PRN Glucose LESS THAN 70 milligrams/deciliter

## 2020-08-05 NOTE — H&P ADULT - NSHPLABSRESULTS_GEN_ALL_CORE
T(C): 35.8 (08-05-20 @ 03:00), Max: 39.7 (08-04-20 @ 20:34)  HR: 85 (08-05-20 @ 03:00) (85 - 115)  BP: 99/65 (08-05-20 @ 03:00) (98/56 - 128/69)  RR: 24 (08-05-20 @ 03:00) (20 - 40)  SpO2: 96% (08-05-20 @ 03:00) (95% - 98%)    LABS:                        14.0   17.32 )-----------( 308      ( 04 Aug 2020 21:00 )             42.2     08-04    137  |  106  |  18  ----------------------------<  186<H>  5.0   |  18  |  1.0    Ca    8.5      04 Aug 2020 21:00    TPro  6.8  /  Alb  2.7<L>  /  TBili  0.7  /  DBili  x   /  AST  51<H>  /  ALT  50<H>  /  AlkPhos  89  08-04      Alanine Aminotransferase (ALT/SGPT): 50 U/L (08-04-20 @ 21:00)  Aspartate Aminotransferase (AST/SGOT): 51 U/L (08-04-20 @ 21:00)

## 2020-08-05 NOTE — CHART NOTE - NSCHARTNOTEFT_GEN_A_CORE
Patient meets eligibility for convalescent plasma trial. An informed consent was obtained and will be kept in the research chart for the patient. Investigational convalescent plasma (2 units) to be infused over 2 hours each. No premedication is required unless the patient had a previous transfusion reaction to blood products. If you have any Qs you can call Dr Bobby ([phone]) or Research contacts: [phone]. A communication order to nursing staff to transfuse plasma is also placed.

## 2020-08-05 NOTE — CHART NOTE - NSCHARTNOTEFT_GEN_A_CORE
Spoke with Dr. Tara Gomes, patient's PMD, who confirmed that Mr. Valentine tested positive for SARS-COV-2 on 7/22/20 (document faxed and placed in chart).     Also, called patient's pharmacy: [facility] [place], and confirmed his medications:  1- Eliquis 5 mg BID  2- Pantoprazole 40 mg daily  3- Atenolol 5mg daily  4- Doxycycline 100 BID  5- Vitamin D Spoke with Dr. Tara Gomes, patient's PMD, who confirmed that Mr. Valentine tested positive for SARS-COV-2 on 7/22/20 (document faxed and placed in chart).   She also noted that patient moved from [place], and was diagnosed with DVT 9-12 months ago, and thus he is on Eliquis, that was not stopped as it is not clear if is provoked or not provoked DVT.     Also, called patient's pharmacy: [facility] [place], and confirmed his medications:  1- Eliquis 5 mg BID  2- Pantoprazole 40 mg daily  3- Atenolol 5mg daily  4- Doxycycline 100 BID  5- Vitamin D Spoke with Dr. Tara Gomes, patient's PMD, who confirmed that Mr. Valentine tested positive for SARS-COV-2 on 7/22/20 (document faxed and placed in chart).   She also noted that patient moved from [place], and was diagnosed with lower extremity DVT, 9-12 months ago, and was placed on Eliquis. Eliquis was not stopped as it is not clear if is provoked or unprovoked DVT.     Also, called patient's pharmacy: [facility] [place], and confirmed his medications:  1- Eliquis 5 mg BID  2- Pantoprazole 40 mg daily  3- Atenolol 5mg daily  4- Doxycycline 100 BID  5- Vitamin D

## 2020-08-05 NOTE — H&P ADULT - ASSESSMENT
80 year old male patient, transferred from [facility] for COVID-19 LRTI.    # COVID-19 LRTI  - Started on Remdesivir 200mg once and then 100mg daily for 9 days  - Follow morning labs in case they want to do plasma exchange  - On Lovenox 70mg bid  - On non-rebreather facemask satting at 96%    # Dyslipidemic  - Patient doesn't know his medications  - Mild LFT elevation  - Kept off statin for now  - Follow morning lipid panel    # Type II diabetic  - Patient doesn't know his medication  - On insulin scale  - Follow morning HbA1c    # Hypertensive  - Continue atenolol    # Dual chamber pacemaker  - Reason for implant uncertain  - ECG sinus rhythm, not requiring pace backup at time of admission    # Diet > DASH/TLC  # DVT > On lovenox therapeutic

## 2020-08-05 NOTE — PATIENT PROFILE ADULT - [facility]
"INTERNAL MEDICINE  Medical Weight Loss - Initial Evaluation    Primary Care Physician: Kehr, Kristen M    ASSESSMENT/PLAN:    1. BMI 50.0-59.9, adult (H)  She has been obese since early childhood complicated by childhood trauma and family stress.  She has significant maladaptive eating patterns which makes any significant changes in her nutritional content of her food difficult.  Will make small regular goals with diet and exercise.  meds to control binge eating.      She is on OCP and she is not sexually active.  Discussed with patient that pregnancy is contraindicated with these medications.      She is a candidate for bariatric surgery in the future if needed.  Patient is not interested at this time.     - Phentermine HCl 8 MG TABS; Take 4 mg by mouth daily Lomaira  Dispense: 30 tablet; Refill: 0  - topiramate (TOPAMAX) 25 MG tablet; Take 1/2 tab daily for 1 week and then 1 tab daily.  Dispense: 30 tablet; Refill: 3    2. Moderate major depression (H)  The current medical regimen is effective;  continue present plan and medications.     3. Other specified hypothyroidism  Not affecting weight     4. OSCAR (generalized anxiety disorder)  The current medical regimen is effective;  continue present plan and medications. Will need to be careful with stimulants.     Patient will begin medical weight loss program. We discussed the need for lifelong dietary changes and a regular exercise program in order to lose weight and in order to maintain weight loss long-term. We discussed a realistic weight goal which is  5-10% of the patients initial weight which has been shown to reduce medical risks and improve overall health. Additional goals will be make thereafter.      We discussed the role of pharmacological agents in the treatment of obesity and the \"off-label\" use of medications in this practice. Discussed that medications must always be used together with lifestyle changes such as improvements in diet choices, portion " control and establishing and maintaining a regular exercise program.     All of patients questions about the weight loss program were answered fully.     Patient Instructions     Call your insurance and ask if Saxenda is covered.    Start Lomaira (phentermine) 1/2 tablet. You can get a coupon on their website.  This has to be taken in the morning.    Start on Topamax, 1/2 tablet daily for 1 week and then 1 tab daily.  Take this at bedtime if it makes you tired.    Eat fresh fruits and vegetables at all meals.    Start doing the 7-minute workout (American [employer] Sports [employer])    Take your blood pressure and pulse in 1 week and send to me via [employer].    Follow up with myself or Bekah Doll in 1 month.      Chief Complaint:   Chief Complaint   Patient presents with     Weight Problem     Initial.     Wt Readings from Last 5 Encounters:   06/18/18 (!) 154 kg (339 lb 8 oz)   04/18/18 (!) (P) 154.7 kg (341 lb)   08/14/17 (!) 154.2 kg (340 lb)   08/08/17 (!) 153.3 kg (338 lb)   11/15/16 (!) 164 kg (361 lb 9.6 oz)     HISTORY OF PRESENT ILLNESS (HPI):    Patient is 28 year old year old female who is here for medical weight loss initial evaluation.       Weight history- When she was 8 years old, she gained 60lbs and grew a lot. Her thyroid tests were normal at the time and her doctor put her and her mother on the same diet. Her mom lost weight but she gained weight. Over the years, her weight has increased steadily. She has tried Tavala supplement for weight loss. Last year, her grandma passed away so she started eating healthier and going to the gym which helped her lose weight. She did not lose weight for the first 3 months due to decreased thyroid function but after increasing levothyroxine, she started losing weight. A couple years ago, she was 400lbs and after she lost weight last year, she was about 320lbs. She has gained about 30lbs back since then. This year, she tried the same diet but has not been  able to lose weight.    Diet - Her mother used to make healthy meals for her because she says she is not a good cook. She does have salad and frozen vegetables but chooses to eat frozen dinners. Cost is not prohibitive for groceries. She has to eat a lot to feel full and will eat again in 1-2 hours after feeling full. Comments she eats out of boredom. Denies eating an entire bag of chips or cookies in one sitting.  Typical daily diet  Breakfast: cereal or eggs/yogurt  Lunch: frozen meal  Dinner: cereal or frozen meal  Snacks: cheese stick, yogurt, sugar free chocolate pudding    Exercise - Minimal exercise currently because she just moved and lives in an unfamiliar area. Her friend moves in in 2 weeks and is active so she hopes to go on walks with her.    Family history - Her mother is overweight and her grandma had weight problems. She does not know about her dad's side but says she gets her thyroid problems from her dad's side.    Mood - Patient has depression and anxiety but mostly depression. Anxiety prevent her from going to new places so she does not go out a lot. Getting enough sleep at night. She used to be stressed and anxious but moving out and seeing her therapist has been beneficial.    Contraception - She is on birth control to regulate her periods and it has been working. Patient is not sexually active. She has some hair growth on her chin which she plucks but denies any bothersome hair growth on her chest or abdomen.     Additional notes:  She had a cholecystectomy in 7th grade due to abdominal pains.   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     She desires to lose weight to Be more active, Get healthier, Improve overall health and Improve self worth.    Lowest adult weight was 320 lbs.  Highest adult weight was 400 lbs.   Patient feels her goal weight is 160 lbs.     Previous weight loss efforts: Exercise    Exercise  Habits: Patient is not currently exercising.  Previous exercise included  walking PAVS=0    Patient has not had weight loss surgery.  Patient has not considered weight loss surgery.   Patient has considered weight loss medication.  Patient has been on weight loss medication Tavala      OBESITY RELATED COMORBIDITIES:   depression    PAST SURGICAL HISTORY:   Past Surgical History:   Procedure Laterality Date     CHOLECYSTECTOMY, LAPOROSCOPIC  12/2009       PAST MEDICAL HISTORY:  Past Medical History:   Diagnosis Date     ASCUS of cervix with negative high risk HPV 08/08/2017     Depressive disorder      Hypothyroidism 2009     Moderate major depression (H)      Morbidly obese (H)        MEDICATIONS:   Current Outpatient Prescriptions   Medication Sig Dispense Refill     desogestrel-ethinyl estradiol (ENSKYCE) 0.15-30 MG-MCG per tablet Take 1 tablet by mouth daily 84 tablet 3     levothyroxine (SYNTHROID/LEVOTHROID) 200 MCG tablet Take 1 tablet (200 mcg) by mouth daily 90 tablet 3     levothyroxine (SYNTHROID/LEVOTHROID) 25 MCG tablet Take 1 tablet (25 mcg) by mouth daily (Add to 25 mcg 200 mcg to equal 225 mcg daily) 90 tablet 1     MELATONIN PO Take 3 mg by mouth At Bedtime       multivitamin, therapeutic with minerals (MULTI-VITAMIN) TABS tablet Take 1 tablet by mouth daily         ALLERGIES:   Allergies   Allergen Reactions     Biaxin [Clarithromycin]      rash       SOCIAL HISTORY:   Social History     Social History     Marital status: Single     Spouse name: N/A     Number of children: N/A     Years of education: N/A     Occupational History     Not on file.     Social History Main Topics     Smoking status: Never Smoker     Smokeless tobacco: Never Used     Alcohol use No     Drug use: No     Sexual activity: No     Other Topics Concern     Not on file     Social History Narrative       FAMILY HISTORY:   Family History   Problem Relation Age of Onset     Asthma Mother      Other Cancer Mother      Depression  "Mother      Obesity Mother      Melanoma Mother      Unknown/Adopted Father      Other Cancer Father      Arthritis Maternal Grandmother      Thyroid Disease Maternal Grandmother      Obesity Maternal Grandmother      CANCER Maternal Grandfather      lung cancer     Unknown/Adopted Paternal Grandmother      Unknown/Adopted Paternal Grandfather      Asthma Brother      Allergies Brother        REVIEW OF SYSTEMS:   ROS: 10 point ROS neg other than the symptoms noted above in the HPI.     This document serves as a record of the services and decisions personally performed and made by Abby Ren MD. It was created on his/her behalf by Maryam Foley, trained medical scribe. The creation of this document is based the provider's statements to the medical scribes.    Sergio Foley 10:44 AM, June 18, 2018    PHYSICAL EXAMINATION:    VITALS: /62 (BP Location: Right arm, Cuff Size: Adult Large)  Pulse 84  Temp 96.8  F (36  C) (Oral)  Ht 1.651 m (5' 5\")  Wt (!) 154 kg (339 lb 8 oz)  LMP 05/28/2018 (Approximate)  SpO2 100%  Breastfeeding? No  BMI 56.5 kg/m2  GENERAL: Patient is a 28 year old year old female is in no acute distress.  Patient is alert and orientated x 4, pleasant and cooperative with exam. Mood and affect are appropriate.  HEENT:  Head is normocephalic, nontraumatic. Oropharynx normal  NECK: is supple without lymphadenopathy, thyroidmegaly, or mass.  Trachea midline.   CARDIOVASCULAR:  Distant heart sounds. Regular rate and rhythm without murmurs, rubs, or gallops.  RESPIRATORY:  Lungs are clear to auscultation bilaterally, respiratory effort is normal.   GASTROINTESTINAL:  Abdomen is obese, soft, nontender, without obvious organomegaly or masses.  Positive bowel sounds throughout. No bruits.  LOWER EXTREMITIES:  No edema, cyanosis, ulceration, or chronic venous stasis noted bilaterally.  NEUROLOGIC:   Gait appears normal  SKIN:  No intertiginous irritation or rash.    1+ posterior tibial pulses " bilateral    PSYCH: mentation appears normal, affect normal/bright    LAB RESULTS:   Office Visit on 04/18/2018   Component Date Value Ref Range Status     TSH 04/18/2018 1.52  0.40 - 4.00 mU/L Final         I spent 33 minutes of time with the patient and >50% of it was in education and counseling regarding weight management.    The information in this document, created by the medical scribe, Maryam Foley, for me, accurately reflects the services I personally performed and the decisions made by me. I have reviewed and approved this document for accuracy prior to leaving the patient care area.    Abby Ren MD  Internal Medicine    American Board of Obesity Medicine Diplomate     Start 10:44 AM  End 11:17 AM     (2) potential problem

## 2020-08-06 NOTE — PROGRESS NOTE ADULT - ASSESSMENT
IMPRESSION:    Acute Hypoxemic Respiratory Failure   Sepsis present on admission   COVID-19 PNA  HO DVT was on Eliquis.   Acute DVT while on AC,  Favor GFF for now since a PE could lead to the patient';s demise   S/P PPM    PLAN:    CNS: Avoid CNS depressants.      HEENT: Oral care    PULMONARY: keep sat >88%.   HOB @ 45 degrees. Aspiration Precautions.  HFNCO2 for now.      CARDIOVASCULAR: Avoid volume overload . Keep I = O.  FU ECHO     GI: GI prophylaxis.  Feeding clears if tolerated      RENAL:  Follow up lytes.  Correct as needed.  STEVE Riddle.      INFECTIOUS DISEASE:  Trend Procalcitonin.  DC Vanc.  Continue ABX.      HEMATOLOGICAL:  Lovenox q12, IR for GFF.  DW Dr. Winters     ENDOCRINE:  Follow up FS.  Insulin protocol if needed.    MUSCULOSKELETAL: Bedrest    Saleem: No  Lines: Peripheral IV  Code status: Full code  Disposition: CEU   Plan of care d/w son

## 2020-08-06 NOTE — PROGRESS NOTE ADULT - SUBJECTIVE AND OBJECTIVE BOX
Patient is a 80y old  Male who presents with a chief complaint of COVID LRTI (06 Aug 2020 15:31)        Over Night Events:  on HFNCO2.  No SOB at rest.  URBINA         ROS:     All ROS are negative except HPI         PHYSICAL EXAM    ICU Vital Signs Last 24 Hrs  T(C): 36.2 (06 Aug 2020 16:23), Max: 36.7 (05 Aug 2020 20:20)  T(F): 97.2 (06 Aug 2020 16:23), Max: 98 (05 Aug 2020 20:20)  HR: 78 (06 Aug 2020 16:23) (73 - 82)  BP: 120/70 (06 Aug 2020 16:23) (105/52 - 130/65)  BP(mean): 90 (06 Aug 2020 16:23) (76 - 90)  ABP: --  ABP(mean): --  RR: 20 (06 Aug 2020 16:23) (18 - 20)  SpO2: 99% (06 Aug 2020 16:23) (85% - 99%)      CONSTITUTIONAL:  Well nourished.  NAD    ENT:   Airway patent,   Mouth with normal mucosa.   No thrush    EYES:   Pupils equal,   Round and reactive to light.    CARDIAC:   Normal rate,   Regular rhythm.    No edema      Vascular:  Normal systolic impulse  No Carotid bruits    RESPIRATORY:   Bilateral crackles   Bilateral BS  Normal chest expansion  Not tachypneic,    GASTROINTESTINAL:  Abdomen soft,   Non-tender,   No guarding,   + BS    MUSCULOSKELETAL:   Range of motion is not limited,  No clubbing, cyanosis    NEUROLOGICAL:   Alert and oriented   No motor  deficits.    SKIN:   Skin normal color for race,   Warm and dry and intact.   No evidence of rash.    PSYCHIATRIC:   Normal mood and affect.   No apparent risk to self or others.    HEMATOLOGICAL:  No cervical  lymphadenopathy.  no inguinal lymphadenopathy      08-05-20 @ 07:01  -  08-06-20 @ 07:00  --------------------------------------------------------  IN:    Plasma: 390 mL  Total IN: 390 mL    OUT:    Voided: 400 mL  Total OUT: 400 mL    Total NET: -10 mL      08-06-20 @ 07:01  -  08-06-20 @ 16:29  --------------------------------------------------------  IN:  Total IN: 0 mL    OUT:    Voided: 400 mL  Total OUT: 400 mL    Total NET: -400 mL          LABS:                            12.5   17.71 )-----------( 350      ( 06 Aug 2020 08:13 )             38.0                                               08-06    140  |  106  |  32<H>  ----------------------------<  223<H>  4.4   |  20  |  0.8    Ca    8.4<L>      06 Aug 2020 08:13  Mg     2.3     08-06    TPro  6.2  /  Alb  2.6<L>  /  TBili  0.5  /  DBili  0.2  /  AST  29  /  ALT  31  /  AlkPhos  76  08-06      PT/INR - ( 05 Aug 2020 05:24 )   PT: 17.60 sec;   INR: 1.53 ratio         PTT - ( 05 Aug 2020 05:24 )  PTT:36.8 sec                                                                                     LIVER FUNCTIONS - ( 06 Aug 2020 08:13 )  Alb: 2.6 g/dL / Pro: 6.2 g/dL / ALK PHOS: 76 U/L / ALT: 31 U/L / AST: 29 U/L / GGT: x                                                                                                                                   ABG - ( 04 Aug 2020 21:22 )  pH, Arterial: 7.46  pH, Blood: x     /  pCO2: 28    /  pO2: 142   / HCO3: 20    / Base Excess: -2.3  /  SaO2: 99                  MEDICATIONS  (STANDING):  ATENolol  Tablet 25 milliGRAM(s) Oral daily  cefTRIAXone   IVPB 1000 milliGRAM(s) IV Intermittent every 24 hours  dexAMETHasone  Injectable 6 milliGRAM(s) IV Push daily  dextrose 5%. 1000 milliLiter(s) (50 mL/Hr) IV Continuous <Continuous>  dextrose 50% Injectable 12.5 Gram(s) IV Push once  dextrose 50% Injectable 25 Gram(s) IV Push once  dextrose 50% Injectable 25 Gram(s) IV Push once  doxycycline IVPB 100 milliGRAM(s) IV Intermittent every 12 hours  enoxaparin Injectable 70 milliGRAM(s) SubCutaneous every 12 hours  insulin lispro (HumaLOG) corrective regimen sliding scale   SubCutaneous three times a day before meals  pantoprazole    Tablet 40 milliGRAM(s) Oral before breakfast  remdesivir  IVPB 100 milliGRAM(s) IV Intermittent every 24 hours  remdesivir  IVPB   IV Intermittent   vancomycin  IVPB 1000 milliGRAM(s) IV Intermittent every 12 hours    MEDICATIONS  (PRN):  dextrose 40% Gel 15 Gram(s) Oral once PRN Blood Glucose LESS THAN 70 milliGRAM(s)/deciliter  glucagon  Injectable 1 milliGRAM(s) IntraMuscular once PRN Glucose LESS THAN 70 milligrams/deciliter      New X-rays reviewed:                                                                                  ECHO    CXR interpreted by me:

## 2020-08-06 NOTE — PROGRESS NOTE ADULT - SUBJECTIVE AND OBJECTIVE BOX
INTERVENTIONAL RADIOLOGY BRIEF-OPERATIVE NOTE    Procedure: IVC filter placement    Pre-Op Diagnosis: Right lower extremity DVT    Post-Op Diagnosis: same    Attending: Landau   Resident: Justyna    Anesthesia (type):  [ ] General Anesthesia  [ ] Sedation  [ ] Spinal Anesthesia  [x] Local/Regional    Contrast: 50cc    Estimated Blood Loss: 5cc    Condition:   [ ] Critical  [ ] Serious  [x] Fair   [ ] Good    Findings/Follow up Plan of Care: s/p placement of IVC filter via left femoral access.  radiograph demonstrated no prior filter. Initial venogram demonstrated normal size of IVC. Post procedure radiograph demonstrates proper infrarenal filter deployment.    Specimens Removed: none    Implants: option elite IVC filter    Complications: none    Disposition:  d/c back to ICU. Left groin checks.      Please call Interventional Radiology [phone] with any questions, concerns, or issues. INTERVENTIONAL RADIOLOGY BRIEF-OPERATIVE NOTE    Procedure: IVC filter placement    Pre-Op Diagnosis: Right lower extremity DVT    Post-Op Diagnosis: same    Attending: Landau   Resident: Justyna    Anesthesia (type):  [ ] General Anesthesia  [ ] Sedation  [ ] Spinal Anesthesia  [x] Local/Regional    Contrast: 50cc    Estimated Blood Loss: 5cc    Condition:   [ ] Critical  [ ] Serious  [x] Fair   [ ] Good    Findings/Follow up Plan of Care: Venogram demonstrated no caval thrombus. s/p placement of IVC filter via left femoral access.  Post procedure venogram demonstrates proper infrarenal filter deployment.    Specimens Removed: none    Implants: option elite IVC filter    Complications: none    Disposition:  d/c back to ICU. Left groin checks.      Please call Interventional Radiology [phone] with any questions, concerns, or issues.

## 2020-08-06 NOTE — PROGRESS NOTE ADULT - SUBJECTIVE AND OBJECTIVE BOX
JOURDAN, ISMET  80y, Male  Allergy: No Known Allergies      LOS  2d    CHIEF COMPLAINT: COVID LRTI (05 Aug 2020 15:01)      INTERVAL EVENTS/HPI  - No acute events overnight, s/p plasma. HFNC  -  LE Duplex: + DVTs  - T(F): , Max: 98 (08-05-20 @ 20:20)  - Tolerating medication  - WBC Count: 11.99 (08-05-20 @ 05:24)  WBC Count: 17.32 (08-04-20 @ 21:00)  - Creatinine, Serum: 0.9 (08-05-20 @ 11:13)  Creatinine, Serum: 1.0 (08-05-20 @ 07:00)       VITALS:  T(F): 97, Max: 98 (08-05-20 @ 20:20)  HR: 77  BP: 107/56  RR: 18Vital Signs Last 24 Hrs  T(C): 36.1 (06 Aug 2020 00:44), Max: 36.7 (05 Aug 2020 20:20)  T(F): 97 (06 Aug 2020 00:44), Max: 98 (05 Aug 2020 20:20)  HR: 77 (06 Aug 2020 05:51) (73 - 82)  BP: 107/56 (06 Aug 2020 05:51) (100/58 - 130/65)  BP(mean): 76 (06 Aug 2020 05:51) (76 - 84)  RR: 18 (05 Aug 2020 19:40) (17 - 18)  SpO2: 97% (06 Aug 2020 05:51) (85% - 97%)      FH: Non-contributory  Social Hx: Non-contributory    TESTS & MEASUREMENTS:                        13.5   11.99 )-----------( 254      ( 05 Aug 2020 05:24 )             41.8     08-05    139  |  108  |  25<H>  ----------------------------<  186<H>  4.4   |  19  |  0.9    Ca    8.5      05 Aug 2020 11:13    TPro  6.4  /  Alb  2.6<L>  /  TBili  0.5  /  DBili  0.2  /  AST  49<H>  /  ALT  43<H>  /  AlkPhos  80  08-05    eGFR if Non African American: 82 mL/min/1.73M2 (08-05-20 @ 11:13)  eGFR if : 94 mL/min/1.73M2 (08-05-20 @ 11:13)    LIVER FUNCTIONS - ( 05 Aug 2020 07:00 )  Alb: 2.6 g/dL / Pro: 6.4 g/dL / ALK PHOS: 80 U/L / ALT: 43 U/L / AST: 49 U/L / GGT: x                 Lactate, Blood: 2.2 mmol/L (08-05-20 @ 11:13)      INFECTIOUS DISEASES TESTING  COVID-19 PCR: Detected (08-05-20 @ 09:56)  Procalcitonin, Serum: 0.24 (08-05-20 @ 05:24)  Procalcitonin, Serum: 0.16 (08-04-20 @ 21:00)  COVID-19 PCR: NotDetec (08-04-20 @ 21:00)      INFLAMMATORY MARKERS  Sedimentation Rate, Erythrocyte: 58 mm/Hr (08-05-20 @ 11:13)  C-Reactive Protein, Serum: 23.41 mg/dL (08-05-20 @ 11:13)  C-Reactive Protein, Serum: 21.96 mg/dL (08-05-20 @ 05:24)  C-Reactive Protein, Serum: 19.62 mg/dL (08-04-20 @ 21:00)      RADIOLOGY & ADDITIONAL TESTS:  I have personally reviewed the last available Chest xray  CXR      CT      CARDIOLOGY TESTING  Transthoracic Echocardiogram:    EXAM:  2-D ECHO (TTE) COMPLETE        PROCEDURE DATE:  08/05/2020      INTERPRETATION:  REPORT:  TRANSTHORACIC ECHOCARDIOGRAM REPORT        Patient Name:   PETE SOLIMAN Accession #: [id number]  Medical Rec #:  [id number]    Height:      62.6 in 159.0cm  YOB: 1940    Weight:      165.0 lb 74.84 kg  Patient Age:    80 years     BSA:         1.77 m²  Patient Gender: M            BP:          100/58 mmHg      Date of Exam:        8/5/2020 12:15:20 PM  Referring Physician: [id number] ED UNASSIGNED  Sonographer:         Quinn Flores  Fellow:              Aby Watters    Reading Physician:   Oliverio Arevalo M.D.    Procedure:   Follow up or Limited Echocardiogram.  Indications: I50.9 - Heart Failure, unspecified  Diagnosis:   Heart failure, unspecified - I50.9        Summary:   1. LV Ejection Fraction by Sanchez's Method with a biplane EF of 68 %.   2. The left ventricular diastolic function could not be assessed in this study.   3. Mild-moderate tricuspid regurgitation.   4. Estimated pulmonary artery systolic pressure is 40.1 mmHg assuming a right atrial pressure of 3 mmHg, which is consistent with mild pulmonary hypertension.    PHYSICIAN INTERPRETATION:  Left Ventricle: The left ventricular internal cavity size is normal. Left ventricular wall thickness is normal. The left ventricular diastolic function could not be assessed in this study.  Right Ventricle: Normal right ventricular size and function.  Left Atrium: Normal left atrial size.  Right Atrium: Normal right atrial size.  Pericardium: There is no evidence of pericardial effusion.  Mitral Valve: No evidence of mitral stenosis. Trace mitral valve regurgitation is seen.  Tricuspid Valve: Mild-moderate tricuspid regurgitation is visualized. Estimated pulmonary artery systolic pressure is 40.1 mmHg assuming a right atrial pressure of 3 mmHg, which is consistent with mild pulmonary hypertension.  Aortic Valve: No evidence of aortic stenosis. Aortic valve thickening with normal leaflet opening. No evidence of aortic valve regurgitation is seen.  Pulmonic Valve: Trace pulmonic valve regurgitation.  Aorta: The aortic root is normal in size and structure.      2D AND M-MODE MEASUREMENTS (normal ranges within parentheses):  Left Ventricle:                  Normal   Aorta/Left Atrium:             Normal  IVSd (2D):              1.00 cm (0.7-1.1) AoV Cusp Separation: 1.89 cm (1.5-2.6)  LVPWd (2D):             0.97 cm (0.7-1.1) Left Atrium (Mmode): 5.28 cm (1.9-4.0)  LVIDd (2D):             4.40 cm (3.4-5.7) Right Ventricle:  LVIDs (2D):             3.08 cm           RVd (2D):        3.26 cm  LV FS (2D):             30.0 %   (>25%)  Relative Wall Thickness  0.44    (<0.42)    SPECTRAL DOPPLER ANALYSIS:  Tricuspid Valve and PA/RV Systolic Pressure: TR Max Velocity: 3.04 m/s RA Pressure: 3 mmHg RVSP/PASP: 40.1 mmHg      [id number] Oliverio Arevalo M.D., Electronically signed on 8/5/2020 at 3:18:12 PM        *** Final ***                  OLIVERIO AREVALO MD  This document has been electronically signed. Aug  5 2020 12:15PM             (08-05-20 @ 12:15)  12 Lead ECG:   Ventricular Rate 118 BPM    Atrial Rate 118 BPM    P-R Interval 126 ms    QRS Duration 90 ms    Q-T Interval 324 ms    QTC Calculation(Bezet) 454 ms    P Axis 43 degrees    R Axis 5 degrees    T Axis 18 degrees    Diagnosis Line Sinus tachycardia  Poor R wave progression  Confirmed by BRADEN GENTILE MD ([id number]) on 8/5/2020 12:03:30 PM (08-04-20 @ 21:09)      MEDICATIONS  ATENolol  Tablet 25 Oral daily  cefTRIAXone   IVPB     cefTRIAXone   IVPB 1000 IV Intermittent every 24 hours  dexAMETHasone  Injectable 6 IV Push daily  dextrose 5%. 1000 IV Continuous <Continuous>  dextrose 50% Injectable 12.5 IV Push once  dextrose 50% Injectable 25 IV Push once  dextrose 50% Injectable 25 IV Push once  doxycycline IVPB     doxycycline IVPB 100 IV Intermittent every 12 hours  enoxaparin Injectable 70 SubCutaneous every 12 hours  insulin lispro (HumaLOG) corrective regimen sliding scale  SubCutaneous three times a day before meals  pantoprazole    Tablet 40 Oral before breakfast  remdesivir  IVPB 100 IV Intermittent every 24 hours  remdesivir  IVPB  IV Intermittent   sodium zirconium cyclosilicate 5 Oral daily  vancomycin  IVPB 1000 IV Intermittent every 12 hours      WEIGHT  Weight (kg): 74.8 (08-04-20 @ 20:34)  Creatinine, Serum: 0.9 mg/dL (08-05-20 @ 11:13)      ANTIBIOTICS:  cefTRIAXone   IVPB      cefTRIAXone   IVPB 1000 milliGRAM(s) IV Intermittent every 24 hours  doxycycline IVPB      doxycycline IVPB 100 milliGRAM(s) IV Intermittent every 12 hours  remdesivir  IVPB 100 milliGRAM(s) IV Intermittent every 24 hours  remdesivir  IVPB   IV Intermittent   vancomycin  IVPB 1000 milliGRAM(s) IV Intermittent every 12 hours      All available historical records have been reviewed

## 2020-08-06 NOTE — CONSULT NOTE ADULT - SUBJECTIVE AND OBJECTIVE BOX
INTERVENTIONAL RADIOLOGY CONSULT:     Procedure Requested: IVC filter placement     HPI:  80 year old male patient, transferred from [facility] for COVID-19 LRTI.    Known hypertensive, dyslipidemic, type II diabetic, CAD?, conduction abnormality sp Dual chamber pacemaker.     Current history goes back to 8 days ago when the patient began experiencing fever, cough and worsening dyspnea at rest.   Patient diagnosed with COVID-19 LRTI as out patient. Presented 1 day ptp to [facility] for worsening dyspnea, admitted to [facility] for further management. (05 Aug 2020 03:14)      PAST MEDICAL & SURGICAL HISTORY:  Bradycardia  Hypertension  Artificial pacemaker      MEDICATIONS  (STANDING):  ATENolol  Tablet 25 milliGRAM(s) Oral daily  cefTRIAXone   IVPB 1000 milliGRAM(s) IV Intermittent every 24 hours  dexAMETHasone  Injectable 6 milliGRAM(s) IV Push daily  dextrose 5%. 1000 milliLiter(s) (50 mL/Hr) IV Continuous <Continuous>  dextrose 50% Injectable 12.5 Gram(s) IV Push once  dextrose 50% Injectable 25 Gram(s) IV Push once  dextrose 50% Injectable 25 Gram(s) IV Push once  doxycycline IVPB 100 milliGRAM(s) IV Intermittent every 12 hours  enoxaparin Injectable 70 milliGRAM(s) SubCutaneous every 12 hours  insulin lispro (HumaLOG) corrective regimen sliding scale   SubCutaneous three times a day before meals  pantoprazole    Tablet 40 milliGRAM(s) Oral before breakfast  remdesivir  IVPB 100 milliGRAM(s) IV Intermittent every 24 hours  remdesivir  IVPB   IV Intermittent   vancomycin  IVPB 1000 milliGRAM(s) IV Intermittent every 12 hours    MEDICATIONS  (PRN):  dextrose 40% Gel 15 Gram(s) Oral once PRN Blood Glucose LESS THAN 70 milliGRAM(s)/deciliter  glucagon  Injectable 1 milliGRAM(s) IntraMuscular once PRN Glucose LESS THAN 70 milligrams/deciliter      Allergies  No Known Allergies          Physical Exam:   Vital Signs Last 24 Hrs  T(C): 36.3 (06 Aug 2020 07:45), Max: 36.7 (05 Aug 2020 20:20)  T(F): 97.4 (06 Aug 2020 07:45), Max: 98 (05 Aug 2020 20:20)  HR: 73 (06 Aug 2020 07:45) (73 - 82)  BP: 105/64 (06 Aug 2020 07:45) (105/52 - 130/65)  BP(mean): 80 (06 Aug 2020 07:45) (76 - 84)  RR: 18 (06 Aug 2020 07:45) (18 - 18)  SpO2: 99% (06 Aug 2020 07:25) (85% - 99%)                                     Labs:                         12.5   17.71 )-----------( 350      ( 06 Aug 2020 08:13 )             38.0     08-06    140  |  106  |  32<H>  ----------------------------<  223<H>  4.4   |  20  |  0.8    Ca    8.4<L>      06 Aug 2020 08:13  Mg     2.3     08-06    TPro  6.2  /  Alb  2.6<L>  /  TBili  0.5  /  DBili  0.2  /  AST  29  /  ALT  31  /  AlkPhos  76  08-06    PT/INR - ( 05 Aug 2020 05:24 )   PT: 17.60 sec;   INR: 1.53 ratio         PTT - ( 05 Aug 2020 05:24 )  PTT:36.8 sec    Pertinent labs:                      12.5   17.71 )-----------( 350      ( 06 Aug 2020 08:13 )             38.0       08-06    140  |  106  |  32<H>  ----------------------------<  223<H>  4.4   |  20  |  0.8    Ca    8.4<L>      06 Aug 2020 08:13  Mg     2.3     08-06    TPro  6.2  /  Alb  2.6<L>  /  TBili  0.5  /  DBili  0.2  /  AST  29  /  ALT  31  /  AlkPhos  76  08-06      PT/INR - ( 05 Aug 2020 05:24 )   PT: 17.60 sec;   INR: 1.53 ratio         PTT - ( 05 Aug 2020 05:24 )  PTT:36.8 sec    Radiology & Additional Studies:   Radiology imaging reviewed.       ASSESSMENT/ PLAN:     80 year old male p/w COVID PNA, worsening SOB.  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 Currently on Lovenox.   -Will plan for IVC filter placement today.   -NPO    Thank you for the courtesy of this consult, please call [phone] with any further questions.

## 2020-08-06 NOTE — PROGRESS NOTE ADULT - ASSESSMENT
ASSESSMENT  80y M HTN, HLD, CAD?, conduction abnormality sp Dual chamber pacemaker admitted with COVID, tested + as an outpatient about 1 week prior.       IMPRESSION  # COVID-19 PNA with sepsis on admission    Rule out PE     LE Duplex: + DVTs    Procalcitonin, Serum: 0.16 (08-04-20 @ 21:00)- not consistent with bacterial infection    CXR bilateral airspace opacities     WBC 17 on admission  #Cytokine Release Syndrome   D-Dimer Assay, Quantitative: 72679 ng/mL DDU (08-04-20 @ 21:00)  Ferritin, Serum: 1716 ng/mL (08.05.20 @ 07:00)  #Transaminitis     RECOMMENDATIONS  - procalcitonin unremarkable , please D/C antibiotics  - Remdesivir 100mg daily x 5 days pending clinical course. Monitor Cr, LFTS daily  - Decadron 6mg daily IV  - s/p 8/5 Tocilizumab 400mg x1, and recheck d-dimer, crp, ferritin in 48h   - s/p 8/5 2 units convalescent plasma  - Therapeutic A/C  - Recommend prone positioning if possible     If any questions, please call or send a message on [employer] Teams  Spectra [id number]

## 2020-08-07 NOTE — PROGRESS NOTE ADULT - SUBJECTIVE AND OBJECTIVE BOX
JOURDAN, ISMET  80y, Male  Allergy: No Known Allergies      LOS  3d    CHIEF COMPLAINT: COVID LRTI (06 Aug 2020 16:29)      INTERVAL EVENTS/HPI  - No acute events overnight, s/p placement of IVC filter via left femoral access  - HFNC  - T(F): , Max: 98 (08-06-20 @ 23:39)  - WBC Count: 17.71 (08-06-20 @ 08:13) on steroids  WBC Count: 11.99 (08-05-20 @ 05:24)  - Creatinine, Serum: 0.8 (08-06-20 @ 08:13)  Creatinine, Serum: 0.9 (08-06-20 @ 08:13)       VITALS:  T(F): 97.4, Max: 98 (08-06-20 @ 23:39)  HR: 71  BP: 109/65  RR: 20Vital Signs Last 24 Hrs  T(C): 36.3 (07 Aug 2020 08:08), Max: 36.7 (06 Aug 2020 23:39)  T(F): 97.4 (07 Aug 2020 08:08), Max: 98 (06 Aug 2020 23:39)  HR: 71 (07 Aug 2020 08:08) (71 - 78)  BP: 109/65 (07 Aug 2020 08:08) (97/59 - 120/70)  BP(mean): 81 (07 Aug 2020 08:08) (73 - 90)  RR: 20 (07 Aug 2020 08:08) (20 - 20)  SpO2: 92% (07 Aug 2020 07:53) (92% - 99%)      FH: Non-contributory  Social Hx: Non-contributory    TESTS & MEASUREMENTS:                        12.5   17.71 )-----------( 350      ( 06 Aug 2020 08:13 )             38.0     08-06    140  |  106  |  32<H>  ----------------------------<  223<H>  4.4   |  20  |  0.8    Ca    8.4<L>      06 Aug 2020 08:13  Mg     2.3     08-06    TPro  6.2  /  Alb  2.6<L>  /  TBili  0.5  /  DBili  0.2  /  AST  29  /  ALT  31  /  AlkPhos  76  08-06      LIVER FUNCTIONS - ( 06 Aug 2020 08:13 )  Alb: 2.6 g/dL / Pro: 6.2 g/dL / ALK PHOS: 76 U/L / ALT: 31 U/L / AST: 29 U/L / GGT: x                 Lactate, Blood: 2.2 mmol/L (08-05-20 @ 11:13)      INFECTIOUS DISEASES TESTING  Legionella Antigen, Urine: Negative (08-06-20 @ 06:20)  MRSA PCR Result.: Negative (08-06-20 @ 06:20)  COVID-19 PCR: Detected (08-05-20 @ 09:56)  Procalcitonin, Serum: 0.24 (08-05-20 @ 05:24)  Procalcitonin, Serum: 0.16 (08-04-20 @ 21:00)  COVID-19 PCR: NotDetec (08-04-20 @ 21:00)      INFLAMMATORY MARKERS  Sedimentation Rate, Erythrocyte: 58 mm/Hr (08-05-20 @ 11:13)  C-Reactive Protein, Serum: 23.41 mg/dL (08-05-20 @ 11:13)  C-Reactive Protein, Serum: 21.96 mg/dL (08-05-20 @ 05:24)  C-Reactive Protein, Serum: 19.62 mg/dL (08-04-20 @ 21:00)      RADIOLOGY & ADDITIONAL TESTS:  I have personally reviewed the last available Chest xray  CXR      CT      CARDIOLOGY TESTING  Transthoracic Echocardiogram:    EXAM:  2-D ECHO (TTE) COMPLETE        PROCEDURE DATE:  08/05/2020      INTERPRETATION:  REPORT:  TRANSTHORACIC ECHOCARDIOGRAM REPORT        Patient Name:   PETE SOLIMAN Accession #: [id number]  Medical Rec #:  [id number]    Height:      62.6 in 159.0cm  YOB: 1940    Weight:      165.0 lb 74.84 kg  Patient Age:    80 years     BSA:         1.77 m²  Patient Gender: M            BP:          100/58 mmHg      Date of Exam:        8/5/2020 12:15:20 PM  Referring Physician: [id number] ED UNASSIGNED  Sonographer:         Quinn Flores  Fellow:              Aby Watters    Reading Physician:   Oliverio Arevalo M.D.    Procedure:   Follow up or Limited Echocardiogram.  Indications: I50.9 - Heart Failure, unspecified  Diagnosis:   Heart failure, unspecified - I50.9        Summary:   1. LV Ejection Fraction by Sanchez's Method with a biplane EF of 68 %.   2. The left ventricular diastolic function could not be assessed in this study.   3. Mild-moderate tricuspid regurgitation.   4. Estimated pulmonary artery systolic pressure is 40.1 mmHg assuming a right atrial pressure of 3 mmHg, which is consistent with mild pulmonary hypertension.    PHYSICIAN INTERPRETATION:  Left Ventricle: The left ventricular internal cavity size is normal. Left ventricular wall thickness is normal. The left ventricular diastolic function could not be assessed in this study.  Right Ventricle: Normal right ventricular size and function.  Left Atrium: Normal left atrial size.  Right Atrium: Normal right atrial size.  Pericardium: There is no evidence of pericardial effusion.  Mitral Valve: No evidence of mitral stenosis. Trace mitral valve regurgitation is seen.  Tricuspid Valve: Mild-moderate tricuspid regurgitation is visualized. Estimated pulmonary artery systolic pressure is 40.1 mmHg assuming a right atrial pressure of 3 mmHg, which is consistent with mild pulmonary hypertension.  Aortic Valve: No evidence of aortic stenosis. Aortic valve thickening with normal leaflet opening. No evidence of aortic valve regurgitation is seen.  Pulmonic Valve: Trace pulmonic valve regurgitation.  Aorta: The aortic root is normal in size and structure.      2D AND M-MODE MEASUREMENTS (normal ranges within parentheses):  Left Ventricle:                  Normal   Aorta/Left Atrium:             Normal  IVSd (2D):              1.00 cm (0.7-1.1) AoV Cusp Separation: 1.89 cm (1.5-2.6)  LVPWd (2D):             0.97 cm (0.7-1.1) Left Atrium (Mmode): 5.28 cm (1.9-4.0)  LVIDd (2D):             4.40 cm (3.4-5.7) Right Ventricle:  LVIDs (2D):             3.08 cm           RVd (2D):        3.26 cm  LV FS (2D):             30.0 %   (>25%)  Relative Wall Thickness  0.44    (<0.42)    SPECTRAL DOPPLER ANALYSIS:  Tricuspid Valve and PA/RV Systolic Pressure: TR Max Velocity: 3.04 m/s RA Pressure: 3 mmHg RVSP/PASP: 40.1 mmHg      [id number] Oliverio Arevalo M.D., Electronically signed on 8/5/2020 at 3:18:12 PM        *** Final ***                  OLIVERIO AREVALO MD  This document has been electronically signed. Aug  5 2020 12:15PM             (08-05-20 @ 12:15)  12 Lead ECG:   Ventricular Rate 118 BPM    Atrial Rate 118 BPM    P-R Interval 126 ms    QRS Duration 90 ms    Q-T Interval 324 ms    QTC Calculation(Bezet) 454 ms    P Axis 43 degrees    R Axis 5 degrees    T Axis 18 degrees    Diagnosis Line Sinus tachycardia  Poor R wave progression  Confirmed by BRADEN GENTILE MD ([id number]) on 8/5/2020 12:03:30 PM (08-04-20 @ 21:09)      MEDICATIONS  ATENolol  Tablet 25 Oral daily  cefTRIAXone   IVPB 1000 IV Intermittent every 24 hours  dexAMETHasone  Injectable 6 IV Push daily  dextrose 5%. 1000 IV Continuous <Continuous>  dextrose 50% Injectable 12.5 IV Push once  dextrose 50% Injectable 25 IV Push once  dextrose 50% Injectable 25 IV Push once  doxycycline IVPB 100 IV Intermittent every 12 hours  enoxaparin Injectable 70 SubCutaneous every 12 hours  insulin lispro (HumaLOG) corrective regimen sliding scale  SubCutaneous three times a day before meals  pantoprazole    Tablet 40 Oral before breakfast  remdesivir  IVPB 100 IV Intermittent every 24 hours  remdesivir  IVPB  IV Intermittent       WEIGHT  Weight (kg): 74.8 (08-04-20 @ 20:34)      ANTIBIOTICS:  cefTRIAXone   IVPB 1000 milliGRAM(s) IV Intermittent every 24 hours  doxycycline IVPB 100 milliGRAM(s) IV Intermittent every 12 hours  remdesivir  IVPB 100 milliGRAM(s) IV Intermittent every 24 hours  remdesivir  IVPB   IV Intermittent       All available historical records have been reviewed

## 2020-08-07 NOTE — PROGRESS NOTE ADULT - ASSESSMENT
IMPRESSION:    Acute Hypoxemic Respiratory Failure   Sepsis present on admission   COVID-19 PNA  HO DVT was on Eliquis.   Acute DVT while on AC SP GFF    PLAN:    CNS: Avoid CNS depressants.      HEENT: Oral care    PULMONARY: keep sat >88%.   HOB @ 45 degrees. Aspiration Precautions.  HFNCO2 for now.  Wean O2 as tolerated     CARDIOVASCULAR: Avoid volume overload . Keep I = O.  FU ECHO     GI: GI prophylaxis.  Feeding as tolerated      RENAL:  Follow up lytes.  Correct as needed.    INFECTIOUS DISEASE:  Trend Procalcitonin. DC ABX if cultures negative and repeat procal neg.      HEMATOLOGICAL:  therapeutic Lovenox q12,      ENDOCRINE:  Follow up FS.  Insulin protocol if needed.    MUSCULOSKELETAL: Bedrest    Prognosis guarded

## 2020-08-07 NOTE — PROGRESS NOTE ADULT - SUBJECTIVE AND OBJECTIVE BOX
Patient is a 80y old  Male who presents with a chief complaint of COVID LRTI (07 Aug 2020 08:16)        Over Night Events:  Resting in bed.  No SOB at rest.          ROS:     All ROS are negative except HPI         PHYSICAL EXAM    ICU Vital Signs Last 24 Hrs  T(C): 36.3 (07 Aug 2020 08:08), Max: 36.7 (06 Aug 2020 23:39)  T(F): 97.4 (07 Aug 2020 08:08), Max: 98 (06 Aug 2020 23:39)  HR: 73 (07 Aug 2020 14:12) (71 - 78)  BP: 109/65 (07 Aug 2020 08:08) (97/59 - 120/70)  BP(mean): 81 (07 Aug 2020 08:08) (73 - 90)  ABP: --  ABP(mean): --  RR: 28 (07 Aug 2020 14:12) (20 - 30)  SpO2: 96% (07 Aug 2020 14:12) (92% - 99%)      CONSTITUTIONAL:  Well nourished.  NAD    ENT:   Airway patent,   Mouth with normal mucosa.   No thrush    EYES:   Pupils equal,   Round and reactive to light.    CARDIAC:   Normal rate,   Regular rhythm.    No edema      Vascular:  Normal systolic impulse  No Carotid bruits    RESPIRATORY:   No wheezing  Bilateral crackles   Normal chest expansion  Slight  tachypnea   No use of accessory muscles    GASTROINTESTINAL:  Abdomen soft,   Non-tender,   No guarding,   + BS    MUSCULOSKELETAL:   Range of motion is not limited,  No clubbing, cyanosis    NEUROLOGICAL:   Alert and oriented   No motor  deficits.    SKIN:   Skin normal color for race,   Warm and dry and intact.   No evidence of rash.    PSYCHIATRIC:   Normal mood and affect.   No apparent risk to self or others.    HEMATOLOGICAL:  No cervical  lymphadenopathy.  no inguinal lymphadenopathy      08-06-20 @ 07:01  -  08-07-20 @ 07:00  --------------------------------------------------------  IN:    IV PiggyBack: 850 mL  Total IN: 850 mL    OUT:    Voided: 2250 mL  Total OUT: 2250 mL    Total NET: -1400 mL          LABS:                            13.3   12.13 )-----------( 291      ( 07 Aug 2020 06:49 )             41.6                                               08-07    140  |  107  |  28<H>  ----------------------------<  188<H>  5.3<H>   |  17  |  0.9    Ca    8.5      07 Aug 2020 06:49  Phos  3.4     08-07  Mg     2.6     08-07    TPro  6.3  /  Alb  2.7<L>  /  TBili  0.4  /  DBili  0.2  /  AST  31  /  ALT  31  /  AlkPhos  73  08-07                                                                                           LIVER FUNCTIONS - ( 07 Aug 2020 06:49 )  Alb: 2.7 g/dL / Pro: 6.3 g/dL / ALK PHOS: 73 U/L / ALT: 31 U/L / AST: 31 U/L / GGT: x                                                                                                                                       MEDICATIONS  (STANDING):  ATENolol  Tablet 25 milliGRAM(s) Oral daily  cefTRIAXone   IVPB 1000 milliGRAM(s) IV Intermittent every 24 hours  dexAMETHasone  Injectable 6 milliGRAM(s) IV Push daily  dextrose 5%. 1000 milliLiter(s) (50 mL/Hr) IV Continuous <Continuous>  dextrose 50% Injectable 12.5 Gram(s) IV Push once  dextrose 50% Injectable 25 Gram(s) IV Push once  dextrose 50% Injectable 25 Gram(s) IV Push once  doxycycline IVPB 100 milliGRAM(s) IV Intermittent every 12 hours  enoxaparin Injectable 70 milliGRAM(s) SubCutaneous every 12 hours  insulin lispro (HumaLOG) corrective regimen sliding scale   SubCutaneous three times a day before meals  pantoprazole    Tablet 40 milliGRAM(s) Oral before breakfast  remdesivir  IVPB 100 milliGRAM(s) IV Intermittent every 24 hours  remdesivir  IVPB   IV Intermittent     MEDICATIONS  (PRN):  dextrose 40% Gel 15 Gram(s) Oral once PRN Blood Glucose LESS THAN 70 milliGRAM(s)/deciliter  glucagon  Injectable 1 milliGRAM(s) IntraMuscular once PRN Glucose LESS THAN 70 milligrams/deciliter      New X-rays reviewed:                                                                                  ECHO    CXR interpreted by me:

## 2020-08-07 NOTE — PROGRESS NOTE ADULT - ASSESSMENT
ASSESSMENT  80y M HTN, HLD, CAD?, conduction abnormality sp Dual chamber pacemaker admitted with COVID, tested + as an outpatient about 1 week prior.       IMPRESSION  # COVID-19 PNA with sepsis on admission    Rule out PE     LE Duplex: + DVTs s/p IVC filter 8/6    Procalcitonin, Serum: 0.16 (08-04-20 @ 21:00)- not consistent with bacterial infection    CXR bilateral airspace opacities     WBC 17 on admission  #Cytokine Release Syndrome   D-Dimer Assay, Quantitative: 3614 ng/mL DDU (08-06-20 @ 08:13)  C-Reactive Protein, Serum: 23.41 mg/dL (08-05-20 @ 11:13)  Ferritin, Serum: 1716 ng/mL (08-05-20 @ 07:00)  C-Reactive Protein, Serum: 21.96 mg/dL (08-05-20 @ 05:24)  C-Reactive Protein, Serum: 19.62 mg/dL (08-04-20 @ 21:00)  D-Dimer Assay, Quantitative: 97941 ng/mL DDU (08-04-20 @ 21:00)  Ferritin, Serum: 1570 ng/mL (08-04-20 @ 21:00)  #Transaminitis     RECOMMENDATIONS  - procalcitonin unremarkable , please D/C antibiotics  - Remdesivir 100mg daily x 5 days pending clinical course. Monitor Cr, LFTS daily  - Decadron 6mg daily IV  - s/p 8/5 Tocilizumab 400mg x1, and recheck d-dimer, crp, ferritin in 48h   - s/p 8/5 2 units convalescent plasma  - Therapeutic A/C  - Recommend prone positioning if possible     If any questions, please call or send a message on [employer] Teams  Spectra [phone]

## 2020-08-08 NOTE — PROGRESS NOTE ADULT - ASSESSMENT
IMPRESSION:    Acute Hypoxemic Respiratory Failure   Sepsis present on admission   COVID-19 PNA  HO DVT was on Eliquis.   Acute DVT while on AC SP GFF    PLAN:    CNS: Avoid CNS depressants.      HEENT: Oral care    PULMONARY: keep sat >88%.   HOB @ 45 degrees. Aspiration Precautions.  HFNCO2 for now.  Wean O2 as tolerated     CARDIOVASCULAR: Avoid volume overload . Keep I = O.  Echo above    GI: GI prophylaxis.  Feeding as tolerated      RENAL:  Follow up lytes.  Correct as needed.    INFECTIOUS DISEASE: DC'd ABX, cultures negative and repeat procal neg.      HEMATOLOGICAL:  therapeutic Lovenox q12    ENDOCRINE:  Follow up FS.  Insulin protocol if needed.    MUSCULOSKELETAL: Bedrest    Prognosis guarded

## 2020-08-08 NOTE — PROGRESS NOTE ADULT - SUBJECTIVE AND OBJECTIVE BOX
Over Night Events: None    ROS:  See HPI    PHYSICAL EXAM    ICU Vital Signs Last 24 Hrs  T(C): 36.2 (08 Aug 2020 07:30), Max: 36.7 (07 Aug 2020 15:42)  T(F): 97.1 (08 Aug 2020 07:30), Max: 98 (07 Aug 2020 15:42)  HR: 71 (08 Aug 2020 07:30) (66 - 77)  BP: 113/62 (08 Aug 2020 07:30) (96/51 - 113/62)  BP(mean): 81 (08 Aug 2020 07:30) (67 - 81)  ABP: --  ABP(mean): --  RR: 20 (08 Aug 2020 07:30) (20 - 30)  SpO2: 97% (07 Aug 2020 23:40) (92% - 97%)      General:  HEENT: LINDA             Lymph Nodes: No cervical LN   Lungs: Bilateral BS, course   Cardiovascular: Regular   Abdomen: Soft, Positive BS  Extremities: No clubbing   Skin: Warm  Neurological: Non focal       08-07-20 @ 07:01  -  08-08-20 @ 07:00  --------------------------------------------------------  IN:    IV PiggyBack: 350 mL  Total IN: 350 mL    OUT:    Voided: 4 mL  Total OUT: 4 mL    Total NET: 346 mL      LABS:                          14.0   6.64  )-----------( 380      ( 08 Aug 2020 07:40 )             43.9                                               08-08    140  |  108  |  23<H>  ----------------------------<  165<H>  5.2<H>   |  19  |  0.9    Ca    8.6      08 Aug 2020 07:40  Phos  2.8     08-08  Mg     2.3     08-08    TPro  6.3  /  Alb  2.9<L>  /  TBili  0.5  /  DBili  0.2  /  AST  54<H>  /  ALT  51<H>  /  AlkPhos  79  08-08                                                                                        LIVER FUNCTIONS - ( 08 Aug 2020 07:40 )  Alb: 2.9 g/dL / Pro: 6.3 g/dL / ALK PHOS: 79 U/L / ALT: 51 U/L / AST: 54 U/L / GGT: x                                                                                                                                       MEDICATIONS  (STANDING):  ATENolol  Tablet 25 milliGRAM(s) Oral daily  dexAMETHasone  Injectable 6 milliGRAM(s) IV Push daily  dextrose 5%. 1000 milliLiter(s) (50 mL/Hr) IV Continuous <Continuous>  dextrose 50% Injectable 12.5 Gram(s) IV Push once  dextrose 50% Injectable 25 Gram(s) IV Push once  dextrose 50% Injectable 25 Gram(s) IV Push once  enoxaparin Injectable 70 milliGRAM(s) SubCutaneous every 12 hours  insulin lispro (HumaLOG) corrective regimen sliding scale   SubCutaneous three times a day before meals  pantoprazole    Tablet 40 milliGRAM(s) Oral before breakfast  remdesivir  IVPB 100 milliGRAM(s) IV Intermittent every 24 hours  remdesivir  IVPB   IV Intermittent     MEDICATIONS  (PRN):  dextrose 40% Gel 15 Gram(s) Oral once PRN Blood Glucose LESS THAN 70 milliGRAM(s)/deciliter  glucagon  Injectable 1 milliGRAM(s) IntraMuscular once PRN Glucose LESS THAN 70 milligrams/deciliter      Xrays:            Improved                                                                          ECHO:  LVEF 68%

## 2020-08-08 NOTE — PROGRESS NOTE ADULT - SUBJECTIVE AND OBJECTIVE BOX
OVERNIGHT EVENTS: events noted, on HHFNC 80%    Vital Signs Last 24 Hrs  T(C): 36.2 (08 Aug 2020 07:30), Max: 36.7 (07 Aug 2020 15:42)  T(F): 97.1 (08 Aug 2020 07:30), Max: 98 (07 Aug 2020 15:42)  HR: 71 (08 Aug 2020 07:30) (66 - 77)  BP: 113/62 (08 Aug 2020 07:30) (96/51 - 113/62)  BP(mean): 81 (08 Aug 2020 07:30) (67 - 81)  RR: 20 (08 Aug 2020 07:30) (20 - 30)  SpO2: 97% (07 Aug 2020 23:40) (92% - 97%)    PHYSICAL EXAMINATION:    GENERAL: ill looking    HEENT: Head is normocephalic and atraumatic. Extraocular muscles are intact.     NECK: Supple.    LUNGS: r side crackles    HEART: Regular rate and rhythm without murmur.    ABDOMEN: Soft, nontender, and nondistended.      EXTREMITIES: Without any cyanosis, clubbing, rash, lesions or edema.    NEUROLOGIC: Grossly intact.    SKIN: No ulceration or induration present.      LABS:                        14.0   6.64  )-----------( 380      ( 08 Aug 2020 07:40 )             43.9     08-08    140  |  108  |  23<H>  ----------------------------<  165<H>  5.2<H>   |  19  |  0.9    Ca    8.6      08 Aug 2020 07:40  Phos  2.8     08-08  Mg     2.3     08-08    TPro  6.3  /  Alb  2.9<L>  /  TBili  0.5  /  DBili  0.2  /  AST  54<H>  /  ALT  51<H>  /  AlkPhos  79  08-08                    Procalcitonin, Serum: 0.14 ng/mL (08-07-20 @ 15:50)        08-07-20 @ 07:01  -  08-08-20 @ 07:00  --------------------------------------------------------  IN: 350 mL / OUT: 4 mL / NET: 346 mL        MICROBIOLOGY:      MEDICATIONS  (STANDING):  ATENolol  Tablet 25 milliGRAM(s) Oral daily  dexAMETHasone  Injectable 6 milliGRAM(s) IV Push daily  dextrose 5%. 1000 milliLiter(s) (50 mL/Hr) IV Continuous <Continuous>  dextrose 50% Injectable 12.5 Gram(s) IV Push once  dextrose 50% Injectable 25 Gram(s) IV Push once  dextrose 50% Injectable 25 Gram(s) IV Push once  enoxaparin Injectable 70 milliGRAM(s) SubCutaneous every 12 hours  insulin lispro (HumaLOG) corrective regimen sliding scale   SubCutaneous three times a day before meals  pantoprazole    Tablet 40 milliGRAM(s) Oral before breakfast  remdesivir  IVPB 100 milliGRAM(s) IV Intermittent every 24 hours  remdesivir  IVPB   IV Intermittent     MEDICATIONS  (PRN):  dextrose 40% Gel 15 Gram(s) Oral once PRN Blood Glucose LESS THAN 70 milliGRAM(s)/deciliter  glucagon  Injectable 1 milliGRAM(s) IntraMuscular once PRN Glucose LESS THAN 70 milligrams/deciliter      RADIOLOGY & ADDITIONAL STUDIES:

## 2020-08-09 NOTE — PROGRESS NOTE ADULT - ASSESSMENT
IMPRESSION:    Acute Hypoxemic Respiratory Failure   Sepsis present on admission   COVID-19 PNA  HO DVT was on Eliquis.   Acute DVT while on AC SP GFF    PLAN:    CNS: Avoid CNS depressants.      HEENT: Oral care    PULMONARY: keep sat >88%.   HOB @ 45 degrees. Aspiration Precautions.  HHFNC 95% 60 ltr;  keep Sao2 92 to 96%    CARDIOVASCULAR: Avoid volume overload .    GI: GI prophylaxis.  Feeding as tolerated      RENAL:  Follow up lytes.  Correct as needed.    INFECTIOUS DISEASE:  per ID    HEMATOLOGICAL:  therapeutic Lovenox q12,      ENDOCRINE:  Follow up FS.  Insulin protocol if needed.    MUSCULOSKELETAL: Bedrest    Prognosis guarded IMPRESSION:    Acute Hypoxemic Respiratory Failure   Sepsis present on admission   COVID-19 PNA  HO DVT was on Eliquis.   Acute DVT while on AC SP GFF    PLAN:    CNS: Avoid CNS depressants.      HEENT: Oral care    PULMONARY: keep sat >88%.   HOB @ 45 degrees. Aspiration Precautions.  HHFNC 95% 60 ltr;  keep Sao2 92 to 96%    CARDIOVASCULAR: Avoid volume overload . keep equal balance    GI: GI prophylaxis.  Feeding as tolerated      RENAL:  Follow up lytes.  Correct as needed.    INFECTIOUS DISEASE:  per ID    HEMATOLOGICAL:  therapeutic Lovenox q12,      ENDOCRINE:  Follow up FS.  Insulin protocol if needed.    MUSCULOSKELETAL: Bedrest    Prognosis guarded

## 2020-08-09 NOTE — PROGRESS NOTE ADULT - SUBJECTIVE AND OBJECTIVE BOX
JOURDAN, ISMET  80y, Male  Allergy: No Known Allergies      LOS  5d    CHIEF COMPLAINT: COVID LRTI (08 Aug 2020 11:19)      INTERVAL EVENTS/HPI  - HFNC  - T(F): , Max: 97.2 (08-08-20 @ 23:45)  - Tolerating medication  - WBC Count: 6.64 (08-08-20 @ 07:40)  WBC Count: 12.13 (08-07-20 @ 06:49)  - Creatinine, Serum: 0.9 (08-08-20 @ 07:40)  Creatinine, Serum: 0.9 (08-08-20 @ 07:40)     VITALS:  T(F): 95.2, Max: 97.2 (08-08-20 @ 23:45)  HR: 112  BP: 112/68  RR: 20Vital Signs Last 24 Hrs  T(C): 35.1 (09 Aug 2020 07:53), Max: 36.2 (08 Aug 2020 23:45)  T(F): 95.2 (09 Aug 2020 07:53), Max: 97.2 (08 Aug 2020 23:45)  HR: 112 (09 Aug 2020 07:53) (86 - 112)  BP: 112/68 (09 Aug 2020 07:53) (111/67 - 136/84)  BP(mean): 86 (08 Aug 2020 23:45) (86 - 104)  RR: 20 (09 Aug 2020 07:53) (20 - 20)  SpO2: 95% (08 Aug 2020 23:15) (92% - 95%)    FH: Non-contributory  Social Hx: Non-contributory    TESTS & MEASUREMENTS:                        14.0   6.64  )-----------( 380      ( 08 Aug 2020 07:40 )             43.9     08-08    140  |  108  |  23<H>  ----------------------------<  165<H>  5.2<H>   |  19  |  0.9    Ca    8.6      08 Aug 2020 07:40  Phos  2.8     08-08  Mg     2.3     08-08    TPro  6.3  /  Alb  2.9<L>  /  TBili  0.5  /  DBili  0.2  /  AST  54<H>  /  ALT  51<H>  /  AlkPhos  79  08-08      LIVER FUNCTIONS - ( 08 Aug 2020 07:40 )  Alb: 2.9 g/dL / Pro: 6.3 g/dL / ALK PHOS: 79 U/L / ALT: 51 U/L / AST: 54 U/L / GGT: x                 Lactate, Blood: 2.2 mmol/L (08-05-20 @ 11:13)      INFECTIOUS DISEASES TESTING  Procalcitonin, Serum: 0.18 (08-08-20 @ 07:40)  Procalcitonin, Serum: 0.14 (08-07-20 @ 15:50)  Legionella Antigen, Urine: Negative (08-06-20 @ 06:20)  MRSA PCR Result.: Negative (08-06-20 @ 06:20)  COVID-19 PCR: Detected (08-05-20 @ 09:56)  Procalcitonin, Serum: 0.24 (08-05-20 @ 05:24)  Procalcitonin, Serum: 0.16 (08-04-20 @ 21:00)  COVID-19 PCR: NotDetec (08-04-20 @ 21:00)      INFLAMMATORY MARKERS  Sedimentation Rate, Erythrocyte: 58 mm/Hr (08-05-20 @ 11:13)  C-Reactive Protein, Serum: 23.41 mg/dL (08-05-20 @ 11:13)  C-Reactive Protein, Serum: 21.96 mg/dL (08-05-20 @ 05:24)  C-Reactive Protein, Serum: 19.62 mg/dL (08-04-20 @ 21:00)      RADIOLOGY & ADDITIONAL TESTS:  I have personally reviewed the last available Chest xray  CXR      CT      CARDIOLOGY TESTING  Transthoracic Echocardiogram:    EXAM:  2-D ECHO (TTE) COMPLETE        PROCEDURE DATE:  08/05/2020      INTERPRETATION:  REPORT:  TRANSTHORACIC ECHOCARDIOGRAM REPORT        Patient Name:   PETE SOLIMAN Accession #: [id number]  Medical Rec #:  [id number]    Height:      62.6 in 159.0cm  YOB: 1940    Weight:      165.0 lb 74.84 kg  Patient Age:    80 years     BSA:         1.77 m²  Patient Gender: M            BP:          100/58 mmHg      Date of Exam:        8/5/2020 12:15:20 PM  Referring Physician: [id number] ED UNASSIGNED  Sonographer:         Quinn Flores  Fellow:              Aby Watters    Reading Physician:   Oliverio Arevalo M.D.    Procedure:   Follow up or Limited Echocardiogram.  Indications: I50.9 - Heart Failure, unspecified  Diagnosis:   Heart failure, unspecified - I50.9        Summary:   1. LV Ejection Fraction by Sanchez's Method with a biplane EF of 68 %.   2. The left ventricular diastolic function could not be assessed in this study.   3. Mild-moderate tricuspid regurgitation.   4. Estimated pulmonary artery systolic pressure is 40.1 mmHg assuming a right atrial pressure of 3 mmHg, which is consistent with mild pulmonary hypertension.    PHYSICIAN INTERPRETATION:  Left Ventricle: The left ventricular internal cavity size is normal. Left ventricular wall thickness is normal. The left ventricular diastolic function could not be assessed in this study.  Right Ventricle: Normal right ventricular size and function.  Left Atrium: Normal left atrial size.  Right Atrium: Normal right atrial size.  Pericardium: There is no evidence of pericardial effusion.  Mitral Valve: No evidence of mitral stenosis. Trace mitral valve regurgitation is seen.  Tricuspid Valve: Mild-moderate tricuspid regurgitation is visualized. Estimated pulmonary artery systolic pressure is 40.1 mmHg assuming a right atrial pressure of 3 mmHg, which is consistent with mild pulmonary hypertension.  Aortic Valve: No evidence of aortic stenosis. Aortic valve thickening with normal leaflet opening. No evidence of aortic valve regurgitation is seen.  Pulmonic Valve: Trace pulmonic valve regurgitation.  Aorta: The aortic root is normal in size and structure.      2D AND M-MODE MEASUREMENTS (normal ranges within parentheses):  Left Ventricle:                  Normal   Aorta/Left Atrium:             Normal  IVSd (2D):              1.00 cm (0.7-1.1) AoV Cusp Separation: 1.89 cm (1.5-2.6)  LVPWd (2D):             0.97 cm (0.7-1.1) Left Atrium (Mmode): 5.28 cm (1.9-4.0)  LVIDd (2D):             4.40 cm (3.4-5.7) Right Ventricle:  LVIDs (2D):             3.08 cm           RVd (2D):        3.26 cm  LV FS (2D):             30.0 %   (>25%)  Relative Wall Thickness  0.44    (<0.42)    SPECTRAL DOPPLER ANALYSIS:  Tricuspid Valve and PA/RV Systolic Pressure: TR Max Velocity: 3.04 m/s RA Pressure: 3 mmHg RVSP/PASP: 40.1 mmHg      [id number] Oliverio Arevalo M.D., Electronically signed on 8/5/2020 at 3:18:12 PM        *** Final ***                  OLIVERIO AREVALO MD  This document has been electronically signed. Aug  5 2020 12:15PM             (08-05-20 @ 12:15)  12 Lead ECG:   Ventricular Rate 118 BPM    Atrial Rate 118 BPM    P-R Interval 126 ms    QRS Duration 90 ms    Q-T Interval 324 ms    QTC Calculation(Bezet) 454 ms    P Axis 43 degrees    R Axis 5 degrees    T Axis 18 degrees    Diagnosis Line Sinus tachycardia  Poor R wave progression  Confirmed by BRADEN GENTILE MD ([id number]) on 8/5/2020 12:03:30 PM (08-04-20 @ 21:09)      MEDICATIONS  ATENolol  Tablet 25 Oral daily  dexAMETHasone  Injectable 6 IV Push daily  dextrose 5%. 1000 IV Continuous <Continuous>  dextrose 50% Injectable 12.5 IV Push once  dextrose 50% Injectable 25 IV Push once  dextrose 50% Injectable 25 IV Push once  enoxaparin Injectable 70 SubCutaneous every 12 hours  insulin lispro (HumaLOG) corrective regimen sliding scale  SubCutaneous three times a day before meals  pantoprazole    Tablet 40 Oral before breakfast  remdesivir  IVPB 100 IV Intermittent every 24 hours  remdesivir  IVPB  IV Intermittent       WEIGHT  Weight (kg): 74.8 (08-04-20 @ 20:34)      ANTIBIOTICS:  remdesivir  IVPB 100 milliGRAM(s) IV Intermittent every 24 hours  remdesivir  IVPB   IV Intermittent       All available historical records have been reviewed

## 2020-08-09 NOTE — PROGRESS NOTE ADULT - ASSESSMENT
ASSESSMENT  80y M HTN, HLD, CAD?, conduction abnormality sp Dual chamber pacemaker admitted with COVID, tested + as an outpatient about 1 week prior.       IMPRESSION  # COVID-19 PNA with sepsis on admission    Rule out PE     LE Duplex: + DVTs s/p IVC filter 8/6    Procalcitonin, Serum: 0.16 (08-04-20 @ 21:00)- not consistent with bacterial infection    CXR bilateral airspace opacities     WBC 17 on admission  #Cytokine Release Syndrome   D-Dimer Assay, Quantitative: 3614 ng/mL DDU (08-06-20 @ 08:13)  #Transaminitis     RECOMMENDATIONS  - procalcitonin unremarkable , off antibiotics  - Remdesivir 100mg daily x 10 days given continued hypoxemia Monitor Cr, LFTS daily (cutoff 205)  - Decadron 6mg daily IV  - s/p 8/5 Tocilizumab 400mg x1, and recheck d-dimer, crp, ferritin in 48h   - s/p 8/5 2 units convalescent plasma  - Therapeutic A/C  - Recommend prone positioning if possible     If any questions, please call or send a message on [employer] Teams  Spectra [phone]

## 2020-08-09 NOTE — PROGRESS NOTE ADULT - SUBJECTIVE AND OBJECTIVE BOX
Patient is a 80y old  Male who presents with a chief complaint of COVID LRTI (09 Aug 2020 08:01)    desaturation yesterday evening--now 95% HFNC, comfortable, 96% sat      PHYSICAL EXAM    Vital Signs Last 24 Hrs  T(C): 35.1 (09 Aug 2020 07:53), Max: 36.2 (08 Aug 2020 23:45)  T(F): 95.2 (09 Aug 2020 07:53), Max: 97.2 (08 Aug 2020 23:45)  HR: 112 (09 Aug 2020 07:53) (86 - 112)  BP: 112/68 (09 Aug 2020 07:53) (111/67 - 136/84)  BP(mean): 86 (08 Aug 2020 23:45) (86 - 104)  RR: 20 (09 Aug 2020 07:53) (20 - 20)  SpO2: 95% (08 Aug 2020 23:15) (92% - 95%)    General: In NAD          Neck: Supple.   No Cervical LN    Lungs: crackles on the right  Cardiovascular: Regular   Abdomen: Soft. + BS  Extremities: No CLubbing   Neurological: non Focal         LABS:                          14.0   6.64  )-----------( 380      ( 08 Aug 2020 07:40 )             43.9                                               08-08    140  |  108  |  23<H>  ----------------------------<  165<H>  5.2<H>   |  19  |  0.9    Ca    8.6      08 Aug 2020 07:40  Phos  2.8     08-08  Mg     2.3     08-08    TPro  6.3  /  Alb  2.9<L>  /  TBili  0.5  /  DBili  0.2  /  AST  54<H>  /  ALT  51<H>  /  AlkPhos  79  08-08                                                                                           LIVER FUNCTIONS - ( 08 Aug 2020 07:40 )  Alb: 2.9 g/dL / Pro: 6.3 g/dL / ALK PHOS: 79 U/L / ALT: 51 U/L / AST: 54 U/L / GGT: x             MEDICATIONS  (STANDING):  ATENolol  Tablet 25 milliGRAM(s) Oral daily  dexAMETHasone  Injectable 6 milliGRAM(s) IV Push daily  dextrose 5%. 1000 milliLiter(s) (50 mL/Hr) IV Continuous <Continuous>  dextrose 50% Injectable 12.5 Gram(s) IV Push once  dextrose 50% Injectable 25 Gram(s) IV Push once  dextrose 50% Injectable 25 Gram(s) IV Push once  enoxaparin Injectable 70 milliGRAM(s) SubCutaneous every 12 hours  insulin lispro (HumaLOG) corrective regimen sliding scale   SubCutaneous three times a day before meals  pantoprazole    Tablet 40 milliGRAM(s) Oral before breakfast  remdesivir  IVPB 100 milliGRAM(s) IV Intermittent every 24 hours  remdesivir  IVPB   IV Intermittent     MEDICATIONS  (PRN):  dextrose 40% Gel 15 Gram(s) Oral once PRN Blood Glucose LESS THAN 70 milliGRAM(s)/deciliter  glucagon  Injectable 1 milliGRAM(s) IntraMuscular once PRN Glucose LESS THAN 70 milligrams/deciliter Patient is a 80y old  Male who presents with a chief complaint of COVID LRTI (09 Aug 2020 08:01)    desaturation yesterday evening--now 95% HHFNC, comfortable, 96% sat back on 80%      PHYSICAL EXAM    Vital Signs Last 24 Hrs  T(C): 35.1 (09 Aug 2020 07:53), Max: 36.2 (08 Aug 2020 23:45)  T(F): 95.2 (09 Aug 2020 07:53), Max: 97.2 (08 Aug 2020 23:45)  HR: 112 (09 Aug 2020 07:53) (86 - 112)  BP: 112/68 (09 Aug 2020 07:53) (111/67 - 136/84)  BP(mean): 86 (08 Aug 2020 23:45) (86 - 104)  RR: 20 (09 Aug 2020 07:53) (20 - 20)  SpO2: 95% (08 Aug 2020 23:15) (92% - 95%)    General: In NAD          Neck: Supple.   No Cervical LN    Lungs: crackles on the right  Cardiovascular: Regular   Abdomen: Soft. + BS  Extremities: No CLubbing   Neurological: non Focal         LABS:                          14.0   6.64  )-----------( 380      ( 08 Aug 2020 07:40 )             43.9                                               08-08    140  |  108  |  23<H>  ----------------------------<  165<H>  5.2<H>   |  19  |  0.9    Ca    8.6      08 Aug 2020 07:40  Phos  2.8     08-08  Mg     2.3     08-08    TPro  6.3  /  Alb  2.9<L>  /  TBili  0.5  /  DBili  0.2  /  AST  54<H>  /  ALT  51<H>  /  AlkPhos  79  08-08                                                                                           LIVER FUNCTIONS - ( 08 Aug 2020 07:40 )  Alb: 2.9 g/dL / Pro: 6.3 g/dL / ALK PHOS: 79 U/L / ALT: 51 U/L / AST: 54 U/L / GGT: x             MEDICATIONS  (STANDING):  ATENolol  Tablet 25 milliGRAM(s) Oral daily  dexAMETHasone  Injectable 6 milliGRAM(s) IV Push daily  dextrose 5%. 1000 milliLiter(s) (50 mL/Hr) IV Continuous <Continuous>  dextrose 50% Injectable 12.5 Gram(s) IV Push once  dextrose 50% Injectable 25 Gram(s) IV Push once  dextrose 50% Injectable 25 Gram(s) IV Push once  enoxaparin Injectable 70 milliGRAM(s) SubCutaneous every 12 hours  insulin lispro (HumaLOG) corrective regimen sliding scale   SubCutaneous three times a day before meals  pantoprazole    Tablet 40 milliGRAM(s) Oral before breakfast  remdesivir  IVPB 100 milliGRAM(s) IV Intermittent every 24 hours  remdesivir  IVPB   IV Intermittent     MEDICATIONS  (PRN):  dextrose 40% Gel 15 Gram(s) Oral once PRN Blood Glucose LESS THAN 70 milliGRAM(s)/deciliter  glucagon  Injectable 1 milliGRAM(s) IntraMuscular once PRN Glucose LESS THAN 70 milligrams/deciliter

## 2020-08-10 NOTE — PROGRESS NOTE ADULT - ASSESSMENT
IMPRESSION:    Acute Hypoxemic Respiratory Failure   Sepsis present on admission   COVID-19 PNA  HO DVT was on Eliquis.   Acute DVT while on AC SP GFF    PLAN:    CNS: Avoid CNS depressants.      HEENT: Oral care    PULMONARY: keep sat >88%.   HOB @ 45 degrees. Aspiration Precautions.  HHFNC DEC TO 70%    CARDIOVASCULAR: Avoid volume overload . keep equal balance    GI: GI prophylaxis.  Feeding as tolerated      RENAL:  Follow up lytes.  Correct as needed.    INFECTIOUS DISEASE:  per ID, repeat inflammatory markers    HEMATOLOGICAL:  therapeutic Lovenox q12,      ENDOCRINE:  Follow up FS.  Insulin protocol if needed.    MUSCULOSKELETAL: Bedrest    Prognosis guarded

## 2020-08-10 NOTE — PROGRESS NOTE ADULT - ASSESSMENT
80y M HTN, HLD, CAD?, conduction abnormality sp Dual chamber pacemaker admitted with COVID, tested + as an outpatient about 1 week prior.       IMPRESSION  # COVID-19 PNA with sepsis on admission    Rule out PE     LE Duplex: + DVTs s/p IVC filter 8/6    Procalcitonin, Serum: 0.16 (08-04-20 @ 21:00)- not consistent with bacterial infection    CXR bilateral airspace opacities     WBC 17 on admission  #Cytokine Release Syndrome   D-Dimer Assay, Quantitative: 3614 ng/mL DDU (08-06-20 @ 08:13)  #Transaminitis   #Lactic acidosis    RECOMMENDATIONS  - procalcitonin unremarkable , off antibiotics  - Remdesivir 100mg daily x 10 days given continued hypoxemia Monitor Cr, LFTS daily (cutoff 205)  - Decadron 6mg daily IV  - s/p 8/5 Tocilizumab 400mg x1, and recheck d-dimer, crp, ferritin in 48h   - s/p 8/5 2 units convalescent plasma  - Therapeutic A/C  - Recommend prone positioning if possible

## 2020-08-10 NOTE — PROGRESS NOTE ADULT - SUBJECTIVE AND OBJECTIVE BOX
OVERNIGHT EVENTS: events noted,still on HHFNC 80%, afebrile    Vital Signs Last 24 Hrs  T(C): 35.8 (10 Aug 2020 07:41), Max: 35.8 (10 Aug 2020 00:01)  T(F): 96.5 (10 Aug 2020 07:41), Max: 96.5 (10 Aug 2020 07:41)  HR: 66 (10 Aug 2020 07:41) (66 - 73)  BP: 110/61 (10 Aug 2020 07:41) (110/61 - 110/65)  BP(mean): 80 (10 Aug 2020 07:41) (80 - 83)  RR: 20 (10 Aug 2020 07:41) (20 - 20)  SpO2: 97% (10 Aug 2020 00:34) (95% - 99%)    PHYSICAL EXAMINATION:    GENERAL: ill looking    HEENT: Head is normocephalic and atraumatic. Extraocular muscles are intact. Mucous membranes are moist.    NECK: Supple.    LUNGS: r basilar crackles    HEART: JANELLE 3/6    ABDOMEN: Soft, nontender, and nondistended.      EXTREMITIES: Without any cyanosis, clubbing, rash, lesions or edema.    NEUROLOGIC: Grossly intact.    SKIN: No ulceration or induration present.      LABS:                        14.3   11.25 )-----------( 371      ( 09 Aug 2020 08:10 )             43.5     08-10    138  |  105  |  22<H>  ----------------------------<  113<H>  4.8   |  23  |  0.9    Ca    8.6      10 Aug 2020 05:47    TPro  6.0  /  Alb  3.0<L>  /  TBili  0.6  /  DBili  x   /  AST  34  /  ALT  47<H>  /  AlkPhos  71  08-10                    Procalcitonin, Serum: 0.18 ng/mL (08-08-20 @ 07:40)  Procalcitonin, Serum: 0.14 ng/mL (08-07-20 @ 15:50)        08-09-20 @ 07:01  -  08-10-20 @ 07:00  --------------------------------------------------------  IN: 0 mL / OUT: 1000 mL / NET: -1000 mL        MICROBIOLOGY:      MEDICATIONS  (STANDING):  ATENolol  Tablet 25 milliGRAM(s) Oral daily  dexAMETHasone  Injectable 6 milliGRAM(s) IV Push daily  dextrose 5%. 1000 milliLiter(s) (50 mL/Hr) IV Continuous <Continuous>  dextrose 50% Injectable 12.5 Gram(s) IV Push once  dextrose 50% Injectable 25 Gram(s) IV Push once  dextrose 50% Injectable 25 Gram(s) IV Push once  enoxaparin Injectable 70 milliGRAM(s) SubCutaneous every 12 hours  insulin lispro (HumaLOG) corrective regimen sliding scale   SubCutaneous three times a day before meals  pantoprazole    Tablet 40 milliGRAM(s) Oral before breakfast  remdesivir  IVPB 100 milliGRAM(s) IV Intermittent every 24 hours  remdesivir  IVPB   IV Intermittent     MEDICATIONS  (PRN):  dextrose 40% Gel 15 Gram(s) Oral once PRN Blood Glucose LESS THAN 70 milliGRAM(s)/deciliter  glucagon  Injectable 1 milliGRAM(s) IntraMuscular once PRN Glucose LESS THAN 70 milligrams/deciliter      RADIOLOGY & ADDITIONAL STUDIES:

## 2020-08-10 NOTE — PROGRESS NOTE ADULT - SUBJECTIVE AND OBJECTIVE BOX
JOURDAN, ISMET  80y, Male  Allergy: No Known Allergies      CHIEF COMPLAINT: COVID LRTI (09 Aug 2020 08:16)      INTERVAL EVENTS/HPI  - No acute events overnight  - T(F): , Max: 96.5 (08-10-20 @ 07:41)  - Denies any worsening symptoms  - Tolerating medication  -     ROS  General: Denies fevers, chills, nightsweats, weight loss  HEENT: Denies headache, rhinorrhea, sore throat, eye pain  CV: Denies CP, palpitations  PULM: Denies SOB, cough  GI: Denies abdominal pain, diarrhea  : Denies dysuria, hematuria  MSK: Denies arthralgias  SKIN: Denies rash   NEURO: Denies paresthesias, weakness  PSYCH: Denies depression    FH non-contributory   Social Hx non-contributory    VITALS:  T(F): 96.5, Max: 96.5 (08-10-20 @ 07:41)  HR: 66  BP: 110/61  RR: 20Vital Signs Last 24 Hrs  T(C): 35.8 (10 Aug 2020 07:41), Max: 35.8 (10 Aug 2020 00:01)  T(F): 96.5 (10 Aug 2020 07:41), Max: 96.5 (10 Aug 2020 07:41)  HR: 66 (10 Aug 2020 07:41) (66 - 73)  BP: 110/61 (10 Aug 2020 07:41) (110/61 - 110/65)  BP(mean): 80 (10 Aug 2020 07:41) (80 - 83)  RR: 20 (10 Aug 2020 07:41) (20 - 20)  SpO2: 97% (10 Aug 2020 00:34) (95% - 99%)    PHYSICAL EXAM:  Gen: NAD, resting in bed  HEENT: Normocephalic, atraumatic  Neck: supple, no lymphadenopathy  CV: Regular rate & regular rhythm  Lungs: decreased BS at bases, no fremitus  Abdomen: Soft, BS present  Ext: Warm, well perfused  Neuro: non focal, awake  Skin: no rash, no erythema      TESTS & MEASUREMENTS:                        14.3   11.25 )-----------( 371      ( 09 Aug 2020 08:10 )             43.5     08-10    138  |  105  |  22<H>  ----------------------------<  113<H>  4.8   |  23  |  0.9    Ca    8.6      10 Aug 2020 05:47    TPro  6.0  /  Alb  3.0<L>  /  TBili  0.6  /  DBili  x   /  AST  34  /  ALT  47<H>  /  AlkPhos  71  08-10    eGFR if Non African American: 82 mL/min/1.73M2 (08-10-20 @ 05:47)  eGFR if : 94 mL/min/1.73M2 (08-10-20 @ 05:47)    LIVER FUNCTIONS - ( 10 Aug 2020 05:47 )  Alb: 3.0 g/dL / Pro: 6.0 g/dL / ALK PHOS: 71 U/L / ALT: 47 U/L / AST: 34 U/L / GGT: x                 Lactate, Blood: 2.2 mmol/L (08-05-20 @ 11:13)      INFECTIOUS DISEASES TESTING  Legionella Antigen, Urine: Negative (08-06-20 @ 06:20)  MRSA PCR Result.: Negative (08-06-20 @ 06:20)  Streptococcus Pneumoniae Ag Urine: Negative (08-06-20 @ 06:20)      RADIOLOGY & ADDITIONAL TESTS:  I have personally reviewed the last Chest xray  CXR      CT      CARDIOLOGY TESTING  Transthoracic Echocardiogram:    EXAM:  2-D ECHO (TTE) COMPLETE        PROCEDURE DATE:  08/05/2020      INTERPRETATION:  REPORT:  TRANSTHORACIC ECHOCARDIOGRAM REPORT        Patient Name:   PETE SOLIMAN Accession #: [id number]  Medical Rec #:  [id number]    Height:      62.6 in 159.0cm  YOB: 1940    Weight:      165.0 lb 74.84 kg  Patient Age:    80 years     BSA:         1.77 m²  Patient Gender: M            BP:          100/58 mmHg      Date of Exam:        8/5/2020 12:15:20 PM  Referring Physician: [id number] ED UNASSIGNED  Sonographer:         Quinn Flores  Fellow:              Aby Watters    Reading Physician:   Oliverio Arevalo M.D.    Procedure:   Follow up or Limited Echocardiogram.  Indications: I50.9 - Heart Failure, unspecified  Diagnosis:   Heart failure, unspecified - I50.9        Summary:   1. LV Ejection Fraction by Sanchez's Method with a biplane EF of 68 %.   2. The left ventricular diastolic function could not be assessed in this study.   3. Mild-moderate tricuspid regurgitation.   4. Estimated pulmonary artery systolic pressure is 40.1 mmHg assuming a right atrial pressure of 3 mmHg, which is consistent with mild pulmonary hypertension.    PHYSICIAN INTERPRETATION:  Left Ventricle: The left ventricular internal cavity size is normal. Left ventricular wall thickness is normal. The left ventricular diastolic function could not be assessed in this study.  Right Ventricle: Normal right ventricular size and function.  Left Atrium: Normal left atrial size.  Right Atrium: Normal right atrial size.  Pericardium: There is no evidence of pericardial effusion.  Mitral Valve: No evidence of mitral stenosis. Trace mitral valve regurgitation is seen.  Tricuspid Valve: Mild-moderate tricuspid regurgitation is visualized. Estimated pulmonary artery systolic pressure is 40.1 mmHg assuming a right atrial pressure of 3 mmHg, which is consistent with mild pulmonary hypertension.  Aortic Valve: No evidence of aortic stenosis. Aortic valve thickening with normal leaflet opening. No evidence of aortic valve regurgitation is seen.  Pulmonic Valve: Trace pulmonic valve regurgitation.  Aorta: The aortic root is normal in size and structure.      2D AND M-MODE MEASUREMENTS (normal ranges within parentheses):  Left Ventricle:                  Normal   Aorta/Left Atrium:             Normal  IVSd (2D):              1.00 cm (0.7-1.1) AoV Cusp Separation: 1.89 cm (1.5-2.6)  LVPWd (2D):             0.97 cm (0.7-1.1) Left Atrium (Mmode): 5.28 cm (1.9-4.0)  LVIDd (2D):             4.40 cm (3.4-5.7) Right Ventricle:  LVIDs (2D):             3.08 cm           RVd (2D):        3.26 cm  LV FS (2D):             30.0 %   (>25%)  Relative Wall Thickness  0.44    (<0.42)    SPECTRAL DOPPLER ANALYSIS:  Tricuspid Valve and PA/RV Systolic Pressure: TR Max Velocity: 3.04 m/s RA Pressure: 3 mmHg RVSP/PASP: 40.1 mmHg      [id number] Oliverio Arevalo M.D., Electronically signed on 8/5/2020 at 3:18:12 PM        *** Final ***                  OLIVERIO AREVALO MD  This document has been electronically signed. Aug  5 2020 12:15PM             (08-05-20 @ 12:15)  12 Lead ECG:   Ventricular Rate 118 BPM    Atrial Rate 118 BPM    P-R Interval 126 ms    QRS Duration 90 ms    Q-T Interval 324 ms    QTC Calculation(Bezet) 454 ms    P Axis 43 degrees    R Axis 5 degrees    T Axis 18 degrees    Diagnosis Line Sinus tachycardia  Poor R wave progression  Confirmed by BRADEN GENTILE MD ([id number]) on 8/5/2020 12:03:30 PM (08-04-20 @ 21:09)      MEDICATIONS  ATENolol  Tablet 25  dexAMETHasone  Injectable 6  dextrose 5%. 1000  dextrose 50% Injectable 12.5  dextrose 50% Injectable 25  dextrose 50% Injectable 25  enoxaparin Injectable 70  insulin lispro (HumaLOG) corrective regimen sliding scale   pantoprazole    Tablet 40  remdesivir  IVPB 100  remdesivir  IVPB       ANTIBIOTICS:  remdesivir  IVPB 100 milliGRAM(s) IV Intermittent every 24 hours  remdesivir  IVPB   IV Intermittent       All available historical data has been reviewed

## 2020-08-11 NOTE — DIETITIAN INITIAL EVALUATION ADULT. - ENERGY NEEDS
Calories: 6658-9109 kcal/day (MSJ x 1-1.2 AF)--obese BMI considered  Protein: 67-81 gm/day (0.9-1.1 gm/kg CBW)--same as above  Fluid: per [facility]ENT team

## 2020-08-11 NOTE — PROGRESS NOTE ADULT - SUBJECTIVE AND OBJECTIVE BOX
JOURDAN, ISMET  80y, Male  Allergy: No Known Allergies      LOS  7d    CHIEF COMPLAINT: COVID LRTI (10 Aug 2020 08:51)      INTERVAL EVENTS/HPI  - No acute events overnight, HFNC  - T(F): , Max: 96.9 (08-10-20 @ 15:43)  - WBC Count: 11.75 (08-11-20 @ 06:03)  WBC Count: 11.25 (08-09-20 @ 08:10)  - Creatinine, Serum: 0.9 (08-10-20 @ 05:47)      VITALS:  T(F): 96.3, Max: 96.9 (08-10-20 @ 15:43)  HR: 73  BP: 100/59  RR: 20Vital Signs Last 24 Hrs  T(C): 35.7 (11 Aug 2020 00:06), Max: 36.1 (10 Aug 2020 15:43)  T(F): 96.3 (11 Aug 2020 00:06), Max: 96.9 (10 Aug 2020 15:43)  HR: 73 (11 Aug 2020 00:06) (73 - 74)  BP: 100/59 (11 Aug 2020 00:06) (100/59 - 105/66)  BP(mean): 75 (11 Aug 2020 00:06) (75 - 80)  RR: 20 (11 Aug 2020 00:06) (20 - 20)  SpO2: 99% (10 Aug 2020 23:45) (94% - 99%)    FH: Non-contributory  Social Hx: Non-contributory    TESTS & MEASUREMENTS:                        14.7   11.75 )-----------( 377      ( 11 Aug 2020 06:03 )             45.4     08-10    138  |  105  |  22<H>  ----------------------------<  113<H>  4.8   |  23  |  0.9    Ca    8.6      10 Aug 2020 05:47    TPro  6.0  /  Alb  3.0<L>  /  TBili  0.6  /  DBili  x   /  AST  34  /  ALT  47<H>  /  AlkPhos  71  08-10      LIVER FUNCTIONS - ( 10 Aug 2020 05:47 )  Alb: 3.0 g/dL / Pro: 6.0 g/dL / ALK PHOS: 71 U/L / ALT: 47 U/L / AST: 34 U/L / GGT: x                     INFECTIOUS DISEASES TESTING  Procalcitonin, Serum: 0.06 (08-10-20 @ 12:07)  Procalcitonin, Serum: 0.18 (08-08-20 @ 07:40)  Procalcitonin, Serum: 0.14 (08-07-20 @ 15:50)  Legionella Antigen, Urine: Negative (08-06-20 @ 06:20)  MRSA PCR Result.: Negative (08-06-20 @ 06:20)  Streptococcus Pneumoniae Ag Urine: Negative (08-06-20 @ 06:20)  COVID-19 PCR: Detected (08-05-20 @ 09:56)  Procalcitonin, Serum: 0.24 (08-05-20 @ 05:24)  Procalcitonin, Serum: 0.16 (08-04-20 @ 21:00)  COVID-19 PCR: NotDetec (08-04-20 @ 21:00)      INFLAMMATORY MARKERS  Sedimentation Rate, Erythrocyte: 7 mm/Hr (08-10-20 @ 12:07)  Sedimentation Rate, Erythrocyte: 58 mm/Hr (08-05-20 @ 11:13)  C-Reactive Protein, Serum: 23.41 mg/dL (08-05-20 @ 11:13)  C-Reactive Protein, Serum: 21.96 mg/dL (08-05-20 @ 05:24)      RADIOLOGY & ADDITIONAL TESTS:  I have personally reviewed the last available Chest xray  CXR      CT      CARDIOLOGY TESTING  Transthoracic Echocardiogram:    EXAM:  2-D ECHO (TTE) COMPLETE        PROCEDURE DATE:  08/05/2020      INTERPRETATION:  REPORT:  TRANSTHORACIC ECHOCARDIOGRAM REPORT        Patient Name:   PETE SOLIMAN Accession #: [id number]  Medical Rec #:  [id number]    Height:      62.6 in 159.0cm  YOB: 1940    Weight:      165.0 lb 74.84 kg  Patient Age:    80 years     BSA:         1.77 m²  Patient Gender: M            BP:          100/58 mmHg      Date of Exam:        8/5/2020 12:15:20 PM  Referring Physician: [id number] ED UNASSIGNED  Sonographer:         Quinn Flores  Fellow:              Aby Watters    Reading Physician:   Oliverio Arevalo M.D.    Procedure:   Follow up or Limited Echocardiogram.  Indications: I50.9 - Heart Failure, unspecified  Diagnosis:   Heart failure, unspecified - I50.9        Summary:   1. LV Ejection Fraction by Sanchez's Method with a biplane EF of 68 %.   2. The left ventricular diastolic function could not be assessed in this study.   3. Mild-moderate tricuspid regurgitation.   4. Estimated pulmonary artery systolic pressure is 40.1 mmHg assuming a right atrial pressure of 3 mmHg, which is consistent with mild pulmonary hypertension.    PHYSICIAN INTERPRETATION:  Left Ventricle: The left ventricular internal cavity size is normal. Left ventricular wall thickness is normal. The left ventricular diastolic function could not be assessed in this study.  Right Ventricle: Normal right ventricular size and function.  Left Atrium: Normal left atrial size.  Right Atrium: Normal right atrial size.  Pericardium: There is no evidence of pericardial effusion.  Mitral Valve: No evidence of mitral stenosis. Trace mitral valve regurgitation is seen.  Tricuspid Valve: Mild-moderate tricuspid regurgitation is visualized. Estimated pulmonary artery systolic pressure is 40.1 mmHg assuming a right atrial pressure of 3 mmHg, which is consistent with mild pulmonary hypertension.  Aortic Valve: No evidence of aortic stenosis. Aortic valve thickening with normal leaflet opening. No evidence of aortic valve regurgitation is seen.  Pulmonic Valve: Trace pulmonic valve regurgitation.  Aorta: The aortic root is normal in size and structure.      2D AND M-MODE MEASUREMENTS (normal ranges within parentheses):  Left Ventricle:                  Normal   Aorta/Left Atrium:             Normal  IVSd (2D):              1.00 cm (0.7-1.1) AoV Cusp Separation: 1.89 cm (1.5-2.6)  LVPWd (2D):             0.97 cm (0.7-1.1) Left Atrium (Mmode): 5.28 cm (1.9-4.0)  LVIDd (2D):             4.40 cm (3.4-5.7) Right Ventricle:  LVIDs (2D):             3.08 cm           RVd (2D):        3.26 cm  LV FS (2D):             30.0 %   (>25%)  Relative Wall Thickness  0.44    (<0.42)    SPECTRAL DOPPLER ANALYSIS:  Tricuspid Valve and PA/RV Systolic Pressure: TR Max Velocity: 3.04 m/s RA Pressure: 3 mmHg RVSP/PASP: 40.1 mmHg      [id number] Oliverio Arevalo M.D., Electronically signed on 8/5/2020 at 3:18:12 PM        *** Final ***                  OLIVERIO AREVALO MD  This document has been electronically signed. Aug  5 2020 12:15PM             (08-05-20 @ 12:15)  12 Lead ECG:   Ventricular Rate 118 BPM    Atrial Rate 118 BPM    P-R Interval 126 ms    QRS Duration 90 ms    Q-T Interval 324 ms    QTC Calculation(Bezet) 454 ms    P Axis 43 degrees    R Axis 5 degrees    T Axis 18 degrees    Diagnosis Line Sinus tachycardia  Poor R wave progression  Confirmed by BRADEN GENTILE MD ([id number]) on 8/5/2020 12:03:30 PM (08-04-20 @ 21:09)      MEDICATIONS  ATENolol  Tablet 25 Oral daily  dexAMETHasone  Injectable 6 IV Push daily  dextrose 5%. 1000 IV Continuous <Continuous>  dextrose 50% Injectable 12.5 IV Push once  dextrose 50% Injectable 25 IV Push once  dextrose 50% Injectable 25 IV Push once  enoxaparin Injectable 70 SubCutaneous every 12 hours  insulin lispro (HumaLOG) corrective regimen sliding scale  SubCutaneous three times a day before meals  pantoprazole    Tablet 40 Oral before breakfast  remdesivir  IVPB 100 IV Intermittent every 24 hours  remdesivir  IVPB  IV Intermittent       WEIGHT  Weight (kg): 74.8 (08-04-20 @ 20:34)      ANTIBIOTICS:  remdesivir  IVPB 100 milliGRAM(s) IV Intermittent every 24 hours  remdesivir  IVPB   IV Intermittent       All available historical records have been reviewed

## 2020-08-11 NOTE — DIETITIAN INITIAL EVALUATION ADULT. - REASON INDICATOR FOR ASSESSMENT
LOS--unable to conduct face to face interview d/t limited contact restrictions/isolation precautions 2/2 COVID-19  **Note written by Michell Azevedo RD**

## 2020-08-11 NOTE — PROGRESS NOTE ADULT - ASSESSMENT
80y M HTN, HLD, CAD?, conduction abnormality sp Dual chamber pacemaker admitted with COVID, tested + as an outpatient about 1 week prior.       IMPRESSION  # COVID-19 PNA with sepsis on admission    Rule out PE     LE Duplex: + DVTs s/p IVC filter 8/6    Procalcitonin, Serum: 0.16 (08-04-20 @ 21:00)- not consistent with bacterial infection    CXR bilateral airspace opacities     WBC 17 on admission  #Cytokine Release Syndrome   D-Dimer Assay, Quantitative: 3614 ng/mL DDU (08-06-20 @ 08:13)  #Transaminitis   #Lactic acidosis    RECOMMENDATIONS  - procalcitonin unremarkable , off antibiotics  - Remdesivir 100mg daily x 10 days given continued hypoxemia Monitor Cr, LFTS daily (cutoff 205)  - Decadron 6mg daily IV  - s/p 8/5 Tocilizumab 400mg x1  - s/p 8/5 2 units convalescent plasma  - Therapeutic A/C  - Recommend prone positioning if possible     Please call with any questions or send a message on [employer] Teams  Spectra [phone]

## 2020-08-11 NOTE — DIETITIAN INITIAL EVALUATION ADULT. - NAME AND PHONE
Pt to maintain % po intake of meals and snacks over the next 7 days. RD to monitor energy intake, renal/glucose profile, nutrition focused physical findings

## 2020-08-11 NOTE — DIETITIAN INITIAL EVALUATION ADULT. - FEEDING SKILL
independent/Spoke with pt's RN, who reports that pt has a great appetite, consuming % of all meals w/o any issues

## 2020-08-11 NOTE — PROGRESS NOTE ADULT - SUBJECTIVE AND OBJECTIVE BOX
OVERNIGHT EVENTS: events noted, still on HHFNC, afebrile    Vital Signs Last 24 Hrs  T(C): 36.2 (11 Aug 2020 08:21), Max: 36.2 (11 Aug 2020 08:21)  T(F): 97.2 (11 Aug 2020 08:21), Max: 97.2 (11 Aug 2020 08:21)  HR: 67 (11 Aug 2020 08:21) (67 - 74)  BP: 98/58 (11 Aug 2020 08:21) (98/58 - 105/66)  BP(mean): 72 (11 Aug 2020 08:21) (72 - 80)  RR: 20 (11 Aug 2020 08:21) (20 - 20)  SpO2: 99% (10 Aug 2020 23:45) (94% - 99%)    PHYSICAL EXAMINATION:    GENERAL: The patient is awake and alert in no apparent distress.     HEENT: Head is normocephalic and atraumatic. Extraocular muscles are intact. Mucous membranes are moist.    NECK: Supple.    LUNGS: r basilar crackles    HEART: Regular rate and rhythm without murmur.    ABDOMEN: Soft, nontender, and nondistended.      EXTREMITIES: Without any cyanosis, clubbing, rash, lesions or edema.    NEUROLOGIC: Grossly intact.    SKIN: No ulceration or induration present.      LABS:                        14.7   11.75 )-----------( 377      ( 11 Aug 2020 06:03 )             45.4     08-11    139  |  104  |  21<H>  ----------------------------<  109<H>  4.7   |  22  |  0.9    Ca    8.8      11 Aug 2020 06:03  Phos  3.2     08-11  Mg     2.4     08-11    TPro  5.9<L>  /  Alb  3.0<L>  /  TBili  0.8  /  DBili  x   /  AST  28  /  ALT  45<H>  /  AlkPhos  68  08-11                    Procalcitonin, Serum: 0.06 ng/mL (08-10-20 @ 12:07)        08-10-20 @ 07:01  -  08-11-20 @ 07:00  --------------------------------------------------------  IN: 0 mL / OUT: 1300 mL / NET: -1300 mL        MICROBIOLOGY:      MEDICATIONS  (STANDING):  ATENolol  Tablet 25 milliGRAM(s) Oral daily  dexAMETHasone  Injectable 6 milliGRAM(s) IV Push daily  dextrose 5%. 1000 milliLiter(s) (50 mL/Hr) IV Continuous <Continuous>  dextrose 50% Injectable 12.5 Gram(s) IV Push once  dextrose 50% Injectable 25 Gram(s) IV Push once  dextrose 50% Injectable 25 Gram(s) IV Push once  enoxaparin Injectable 70 milliGRAM(s) SubCutaneous every 12 hours  insulin lispro (HumaLOG) corrective regimen sliding scale   SubCutaneous three times a day before meals  pantoprazole    Tablet 40 milliGRAM(s) Oral before breakfast  remdesivir  IVPB 100 milliGRAM(s) IV Intermittent every 24 hours  remdesivir  IVPB   IV Intermittent     MEDICATIONS  (PRN):  dextrose 40% Gel 15 Gram(s) Oral once PRN Blood Glucose LESS THAN 70 milliGRAM(s)/deciliter  glucagon  Injectable 1 milliGRAM(s) IntraMuscular once PRN Glucose LESS THAN 70 milligrams/deciliter      RADIOLOGY & ADDITIONAL STUDIES:

## 2020-08-11 NOTE — DIETITIAN INITIAL EVALUATION ADULT. - OTHER INFO
Nutrition Hx PTA: Unable to obtain at this time as emergency contact ([phone]) unreachable via phone.    Pt admitted with COVID, tested + as an outpatient about 1 week prior. COVID-19 PNA with sepsis on admission r/o PE. Pt remains on HHFNC and is afebrile.

## 2020-08-11 NOTE — PROGRESS NOTE ADULT - ASSESSMENT
IMPRESSION:    Acute Hypoxemic Respiratory Failure   Sepsis present on admission   COVID-19 PNA  HO DVT was on Eliquis.   Acute DVT while on AC SP GFF    PLAN:    CNS: Avoid CNS depressants.      HEENT: Oral care    PULMONARY: keep sat >88%.   HOB @ 45 degrees. Aspiration Precautions.  HHFNC DEC TO 60%    CARDIOVASCULAR: Avoid volume overload . keep equal balance    GI: GI prophylaxis.  Feeding as tolerated      RENAL:  Follow up lytes.  Correct as needed.    INFECTIOUS DISEASE:  per ID, repeat inflammatory markers    HEMATOLOGICAL:  therapeutic Lovenox q12,      ENDOCRINE:  Follow up FS.  Insulin protocol if needed.    MUSCULOSKELETAL: Bedrest    Prognosis guarded

## 2020-08-12 NOTE — PROGRESS NOTE ADULT - ASSESSMENT
80y M HTN, HLD, CAD?, conduction abnormality sp Dual chamber pacemaker admitted with COVID, tested + as an outpatient about 1 week prior.       IMPRESSION  # COVID-19 PNA with sepsis on admission    Rule out PE     LE Duplex: + DVTs s/p IVC filter 8/6    Procalcitonin, Serum: 0.16 (08-04-20 @ 21:00)- not consistent with bacterial infection    CXR bilateral airspace opacities     WBC 17 on admission  #Cytokine Release Syndrome   D-Dimer Assay, Quantitative: 3614 ng/mL DDU (08-06-20 @ 08:13)  #Transaminitis   #Lactic acidosis    RECOMMENDATIONS  - procalcitonin unremarkable , off antibiotics  - Remdesivir 100mg daily x 10 days given continued hypoxemia Monitor Cr, LFTS daily (cutoff 205)  - Decadron 6mg daily IV x 10 days  - s/p 8/5 Tocilizumab 400mg x1  - s/p 8/5 2 units convalescent plasma  - Therapeutic A/C  - Recommend prone positioning if possible     Please call with any questions or send a message on [employer] Teams  Spectra [phone]

## 2020-08-12 NOTE — PROGRESS NOTE ADULT - SUBJECTIVE AND OBJECTIVE BOX
JOURDAN, ISMET  80y, Male  Allergy: No Known Allergies      LOS  8d    CHIEF COMPLAINT: COVID LRTI (12 Aug 2020 09:23)      INTERVAL EVENTS/HPI  - HFNC  - T(F): , Max: 97.3 (08-11-20 @ 15:56)  - Tolerating medication  - WBC Count: 10.86 (08-12-20 @ 06:18)  WBC Count: 11.75 (08-11-20 @ 06:03)  - Creatinine, Serum: 1.1 (08-12-20 @ 09:50)  Creatinine, Serum: 1.0 (08-12-20 @ 06:18)    VITALS:  T(F): 95.6, Max: 97.3 (08-11-20 @ 15:56)  HR: 67  BP: 101/62  RR: 20Vital Signs Last 24 Hrs  T(C): 35.3 (12 Aug 2020 08:08), Max: 36.3 (11 Aug 2020 15:56)  T(F): 95.6 (12 Aug 2020 08:08), Max: 97.3 (11 Aug 2020 15:56)  HR: 67 (12 Aug 2020 08:24) (66 - 78)  BP: 101/62 (12 Aug 2020 08:08) (96/57 - 103/60)  BP(mean): 76 (12 Aug 2020 08:08) (76 - 76)  RR: 20 (12 Aug 2020 08:08) (18 - 20)  SpO2: 96% (12 Aug 2020 08:24) (92% - 96%)      FH: Non-contributory  Social Hx: Non-contributory    TESTS & MEASUREMENTS:                        15.1   10.86 )-----------( 378      ( 12 Aug 2020 06:18 )             46.0     08-12    x   |  x   |  x   ----------------------------<  x   x    |  x   |  1.1    Ca    8.7      12 Aug 2020 06:18  Phos  3.2     08-12  Mg     2.3     08-12    TPro  6.0  /  Alb  3.2<L>  /  TBili  0.8  /  DBili  0.2  /  AST  25  /  ALT  41  /  AlkPhos  72  08-12    eGFR if : 74 mL/min/1.73M2 (08-12-20 @ 09:50)  eGFR if Non African American: 64 mL/min/1.73M2 (08-12-20 @ 09:50)  eGFR if : 83 mL/min/1.73M2 (08-12-20 @ 06:18)  eGFR if Non African American: 72 mL/min/1.73M2 (08-12-20 @ 06:18)    LIVER FUNCTIONS - ( 12 Aug 2020 09:50 )  Alb: 3.2 g/dL / Pro: 6.0 g/dL / ALK PHOS: 72 U/L / ALT: 41 U/L / AST: 25 U/L / GGT: x                     INFECTIOUS DISEASES TESTING  Procalcitonin, Serum: 0.06 (08-10-20 @ 12:07)  Procalcitonin, Serum: 0.18 (08-08-20 @ 07:40)  Procalcitonin, Serum: 0.14 (08-07-20 @ 15:50)  Legionella Antigen, Urine: Negative (08-06-20 @ 06:20)  MRSA PCR Result.: Negative (08-06-20 @ 06:20)  Streptococcus Pneumoniae Ag Urine: Negative (08-06-20 @ 06:20)  COVID-19 PCR: Detected (08-05-20 @ 09:56)  Procalcitonin, Serum: 0.24 (08-05-20 @ 05:24)  Procalcitonin, Serum: 0.16 (08-04-20 @ 21:00)  COVID-19 PCR: NotDetec (08-04-20 @ 21:00)      INFLAMMATORY MARKERS  C-Reactive Protein, Serum: 0.93 mg/dL (08-11-20 @ 12:07)  Sedimentation Rate, Erythrocyte: 7 mm/Hr (08-10-20 @ 12:07)  Sedimentation Rate, Erythrocyte: 58 mm/Hr (08-05-20 @ 11:13)  C-Reactive Protein, Serum: 23.41 mg/dL (08-05-20 @ 11:13)      RADIOLOGY & ADDITIONAL TESTS:  I have personally reviewed the last available Chest xray  CXR      CT      CARDIOLOGY TESTING  Transthoracic Echocardiogram:    EXAM:  2-D ECHO (TTE) COMPLETE        PROCEDURE DATE:  08/05/2020      INTERPRETATION:  REPORT:  TRANSTHORACIC ECHOCARDIOGRAM REPORT        Patient Name:   PETE SOLIMAN Accession #: [id number]  Medical Rec #:  [id number]    Height:      62.6 in 159.0cm  YOB: 1940    Weight:      165.0 lb 74.84 kg  Patient Age:    80 years     BSA:         1.77 m²  Patient Gender: M            BP:          100/58 mmHg      Date of Exam:        8/5/2020 12:15:20 PM  Referring Physician: [id number] ED UNASSIGNED  Sonographer:         uQinn Flores  Fellow:              Aby Watters    Reading Physician:   Oliverio Arevalo M.D.    Procedure:   Follow up or Limited Echocardiogram.  Indications: I50.9 - Heart Failure, unspecified  Diagnosis:   Heart failure, unspecified - I50.9        Summary:   1. LV Ejection Fraction by Sanchez's Method with a biplane EF of 68 %.   2. The left ventricular diastolic function could not be assessed in this study.   3. Mild-moderate tricuspid regurgitation.   4. Estimated pulmonary artery systolic pressure is 40.1 mmHg assuming a right atrial pressure of 3 mmHg, which is consistent with mild pulmonary hypertension.    PHYSICIAN INTERPRETATION:  Left Ventricle: The left ventricular internal cavity size is normal. Left ventricular wall thickness is normal. The left ventricular diastolic function could not be assessed in this study.  Right Ventricle: Normal right ventricular size and function.  Left Atrium: Normal left atrial size.  Right Atrium: Normal right atrial size.  Pericardium: There is no evidence of pericardial effusion.  Mitral Valve: No evidence of mitral stenosis. Trace mitral valve regurgitation is seen.  Tricuspid Valve: Mild-moderate tricuspid regurgitation is visualized. Estimated pulmonary artery systolic pressure is 40.1 mmHg assuming a right atrial pressure of 3 mmHg, which is consistent with mild pulmonary hypertension.  Aortic Valve: No evidence of aortic stenosis. Aortic valve thickening with normal leaflet opening. No evidence of aortic valve regurgitation is seen.  Pulmonic Valve: Trace pulmonic valve regurgitation.  Aorta: The aortic root is normal in size and structure.      2D AND M-MODE MEASUREMENTS (normal ranges within parentheses):  Left Ventricle:                  Normal   Aorta/Left Atrium:             Normal  IVSd (2D):              1.00 cm (0.7-1.1) AoV Cusp Separation: 1.89 cm (1.5-2.6)  LVPWd (2D):             0.97 cm (0.7-1.1) Left Atrium (Mmode): 5.28 cm (1.9-4.0)  LVIDd (2D):             4.40 cm (3.4-5.7) Right Ventricle:  LVIDs (2D):             3.08 cm           RVd (2D):        3.26 cm  LV FS (2D):             30.0 %   (>25%)  Relative Wall Thickness  0.44    (<0.42)    SPECTRAL DOPPLER ANALYSIS:  Tricuspid Valve and PA/RV Systolic Pressure: TR Max Velocity: 3.04 m/s RA Pressure: 3 mmHg RVSP/PASP: 40.1 mmHg      [id number] Oliverio Arevalo M.D., Electronically signed on 8/5/2020 at 3:18:12 PM        *** Final ***                  OLIVERIO AREVALO MD  This document has been electronically signed. Aug  5 2020 12:15PM             (08-05-20 @ 12:15)  12 Lead ECG:   Ventricular Rate 118 BPM    Atrial Rate 118 BPM    P-R Interval 126 ms    QRS Duration 90 ms    Q-T Interval 324 ms    QTC Calculation(Bezet) 454 ms    P Axis 43 degrees    R Axis 5 degrees    T Axis 18 degrees    Diagnosis Line Sinus tachycardia  Poor R wave progression  Confirmed by BRADEN GENTILE MD ([id number]) on 8/5/2020 12:03:30 PM (08-04-20 @ 21:09)      MEDICATIONS  ATENolol  Tablet 25 Oral daily  dexAMETHasone  Injectable 6 IV Push daily  dextrose 5%. 1000 IV Continuous <Continuous>  dextrose 50% Injectable 12.5 IV Push once  dextrose 50% Injectable 25 IV Push once  dextrose 50% Injectable 25 IV Push once  enoxaparin Injectable 70 SubCutaneous every 12 hours  insulin lispro (HumaLOG) corrective regimen sliding scale  SubCutaneous three times a day before meals  pantoprazole    Tablet 40 Oral before breakfast  remdesivir  IVPB 100 IV Intermittent every 24 hours  remdesivir  IVPB  IV Intermittent       WEIGHT  Weight (kg): 74.8 (08-04-20 @ 20:34)  Creatinine, Serum: 1.1 mg/dL (08-12-20 @ 09:50)  Creatinine, Serum: 1.0 mg/dL (08-12-20 @ 06:18)      ANTIBIOTICS:  remdesivir  IVPB 100 milliGRAM(s) IV Intermittent every 24 hours  remdesivir  IVPB   IV Intermittent       All available historical records have been reviewed

## 2020-08-12 NOTE — PROGRESS NOTE ADULT - SUBJECTIVE AND OBJECTIVE BOX
OVERNIGHT EVENTS: events noted, still on HHFNC 79%, afebrile    Vital Signs Last 24 Hrs  T(C): 35.3 (12 Aug 2020 08:08), Max: 36.3 (11 Aug 2020 15:56)  T(F): 95.6 (12 Aug 2020 08:08), Max: 97.3 (11 Aug 2020 15:56)  HR: 67 (12 Aug 2020 08:24) (66 - 78)  BP: 101/62 (12 Aug 2020 08:08) (96/57 - 103/60)  BP(mean): 76 (12 Aug 2020 08:08) (76 - 76)  RR: 20 (12 Aug 2020 08:08) (18 - 20)  SpO2: 96% (12 Aug 2020 08:24) (92% - 96%)    PHYSICAL EXAMINATION:    GENERAL: ill looking    HEENT: Head is normocephalic and atraumatic. Extraocular muscles are intact. Mucous membranes are moist.    NECK: Supple.    LUNGS: bl crackles    HEART: JANELLE 3/6    ABDOMEN: Soft, nontender, and nondistended.      EXTREMITIES: Without any cyanosis, clubbing, rash, lesions or edema.    NEUROLOGIC: Grossly intact.    SKIN: No ulceration or induration present.      LABS:                        15.1   10.86 )-----------( 378      ( 12 Aug 2020 06:18 )             46.0     08-12    140  |  104  |  23<H>  ----------------------------<  115<H>  4.7   |  28  |  1.0    Ca    8.7      12 Aug 2020 06:18  Phos  3.2     08-12  Mg     2.3     08-12    TPro  6.0  /  Alb  3.1<L>  /  TBili  0.8  /  DBili  x   /  AST  24  /  ALT  43<H>  /  AlkPhos  72  08-12              D-Dimer Assay, Quantitative: 1960 ng/mL DDU (08-11-20 @ 12:07)        Procalcitonin, Serum: 0.06 ng/mL (08-10-20 @ 12:07)        08-11-20 @ 07:01  -  08-12-20 @ 07:00  --------------------------------------------------------  IN: 0 mL / OUT: 1350 mL / NET: -1350 mL        MICROBIOLOGY:      MEDICATIONS  (STANDING):  ATENolol  Tablet 25 milliGRAM(s) Oral daily  dexAMETHasone  Injectable 6 milliGRAM(s) IV Push daily  dextrose 5%. 1000 milliLiter(s) (50 mL/Hr) IV Continuous <Continuous>  dextrose 50% Injectable 12.5 Gram(s) IV Push once  dextrose 50% Injectable 25 Gram(s) IV Push once  dextrose 50% Injectable 25 Gram(s) IV Push once  enoxaparin Injectable 70 milliGRAM(s) SubCutaneous every 12 hours  insulin lispro (HumaLOG) corrective regimen sliding scale   SubCutaneous three times a day before meals  pantoprazole    Tablet 40 milliGRAM(s) Oral before breakfast  remdesivir  IVPB 100 milliGRAM(s) IV Intermittent every 24 hours  remdesivir  IVPB   IV Intermittent     MEDICATIONS  (PRN):  dextrose 40% Gel 15 Gram(s) Oral once PRN Blood Glucose LESS THAN 70 milliGRAM(s)/deciliter  glucagon  Injectable 1 milliGRAM(s) IntraMuscular once PRN Glucose LESS THAN 70 milligrams/deciliter      RADIOLOGY & ADDITIONAL STUDIES:

## 2020-08-12 NOTE — PROGRESS NOTE ADULT - ASSESSMENT
IMPRESSION:    Acute Hypoxemic Respiratory Failure   Sepsis present on admission   COVID-19 PNA  HO DVT was on Eliquis.   Acute DVT while on AC SP GFF    PLAN:    CNS: Avoid CNS depressants.      HEENT: Oral care    PULMONARY: keep sat >88%.   HOB @ 45 degrees. Aspiration Precautions.  HHFNC DEC TO 60%    CARDIOVASCULAR: Avoid volume overload . keep equal to neg balance    GI: GI prophylaxis.  Feeding as tolerated      RENAL:  Follow up lytes.  Correct as needed.    INFECTIOUS DISEASE:  per ID, repeat inflammatory markers reviewed    HEMATOLOGICAL:  therapeutic Lovenox q12,      ENDOCRINE:  Follow up FS.  Insulin protocol if needed.    MUSCULOSKELETAL: Bedrest    Prognosis guarded

## 2020-08-12 NOTE — PROGRESS NOTE ADULT - SUBJECTIVE AND OBJECTIVE BOX
Patient is a 80y old  Male who presents with a chief complaint of COVID LRTI (12 Aug 2020 09:03)        Over Night Events:    no acute events overnight     ALLERGIC/IMMUNOLOGIC:   No active allergic or immunologic issues        PHYSICAL EXAM    ICU Vital Signs Last 24 Hrs  T(C): 35.3 (12 Aug 2020 08:08), Max: 36.3 (11 Aug 2020 15:56)  T(F): 95.6 (12 Aug 2020 08:08), Max: 97.3 (11 Aug 2020 15:56)  HR: 67 (12 Aug 2020 08:24) (66 - 78)  BP: 101/62 (12 Aug 2020 08:08) (96/57 - 103/60)  BP(mean): 76 (12 Aug 2020 08:08) (76 - 76)  RR: 20 (12 Aug 2020 08:08) (18 - 20)  SpO2: 96% (12 Aug 2020 08:24) (92% - 96%)      CONSTITUTIONAL:  NAD, on HFNC    ENT:   Airway patent,   Mouth with normal mucosa.       CARDIAC:   Normal rate,   Regular rhythm.     No edema      RESPIRATORY:   NO wheezing  Bilateral BS  Normal chest expansion  Not tachypneic,  No use of accessory muscles    GASTROINTESTINAL:  Abdomen soft,   Non-tender,   No guarding,   + BS    MUSCULOSKELETAL:   Range of motion is not limited,  No clubbing, cyanosis    NEUROLOGICAL:   Alert and oriented   No motor  deficits.  pertinent DTRs normal    SKIN:   Skin normal color for race,   warm, dry   No evidence of rash.        08-11-20 @ 07:01  -  08-12-20 @ 07:00  --------------------------------------------------------  IN:  Total IN: 0 mL    OUT:    Voided: 1350 mL  Total OUT: 1350 mL    Total NET: -1350 mL          LABS:                            15.1   10.86 )-----------( 378      ( 12 Aug 2020 06:18 )             46.0                                               08-12    140  |  104  |  23<H>  ----------------------------<  115<H>  4.7   |  28  |  1.0    Ca    8.7      12 Aug 2020 06:18  Phos  3.2     08-12  Mg     2.3     08-12    TPro  6.0  /  Alb  3.1<L>  /  TBili  0.8  /  DBili  x   /  AST  24  /  ALT  43<H>  /  AlkPhos  72  08-12                                                                                           LIVER FUNCTIONS - ( 12 Aug 2020 06:18 )  Alb: 3.1 g/dL / Pro: 6.0 g/dL / ALK PHOS: 72 U/L / ALT: 43 U/L / AST: 24 U/L / GGT: x                                                                                                                                       MEDICATIONS  (STANDING):  ATENolol  Tablet 25 milliGRAM(s) Oral daily  dexAMETHasone  Injectable 6 milliGRAM(s) IV Push daily  insulin lispro (HumaLOG) corrective regimen sliding scale   SubCutaneous three times a day before meals  pantoprazole    Tablet 40 milliGRAM(s) Oral before breakfast  remdesivir  IVPB 100 milliGRAM(s) IV Intermittent every 24 hours  remdesivir  IVPB   IV Intermittent     MEDICATIONS  (PRN):  dextrose 40% Gel 15 Gram(s) Oral once PRN Blood Glucose LESS THAN 70 milliGRAM(s)/deciliter  glucagon  Injectable 1 milliGRAM(s) IntraMuscular once PRN Glucose LESS THAN 70 milligrams/deciliter      New X-rays reviewed:          bilateral opacities                                                                         ECHO wnl

## 2020-08-12 NOTE — PROGRESS NOTE ADULT - ASSESSMENT
81 y/o M HTN, DM, DLD, CAD? , conduction abnormality s/p dual chamber pacemaker, hx of DVT on eliquis patient, presented with AHRF 2ry to COVID pna, hospital course complicated by acute dvt     #AHRF 2ry to COVID  -s/p 8/5 2 units convalescent plasma, s/p toci  -HFNC 60 %, weaning down   -procalcitonin unremarkable, off Abx   -on remdesivir d7  -c/w dexamethasone d7     #Hx of DVt was on Eliquis  with Acute DVT while on AC SP GFF 8/5  c/w therapeutic Lovenox for now    # DLD/ DM 2/ HTN  - Continue atenolol    # Dual chamber pacemaker  - Reason for implant uncertain  - ECG sinus rhythm, not requiring pace backup at time of admission    # Diet > DASH/TLC  # DVT  On Lovenox therapeutic

## 2020-08-13 NOTE — PROGRESS NOTE ADULT - SUBJECTIVE AND OBJECTIVE BOX
OVERNIGHT EVENTS: events noted, still on HHFNC    Vital Signs Last 24 Hrs  T(C): 35.6 (13 Aug 2020 07:59), Max: 36.4 (12 Aug 2020 16:01)  T(F): 96.1 (13 Aug 2020 07:59), Max: 97.5 (12 Aug 2020 16:01)  HR: 71 (13 Aug 2020 09:36) (63 - 97)  BP: 88/54 (13 Aug 2020 07:59) (83/54 - 95/58)  BP(mean): 66 (13 Aug 2020 07:59) (64 - 71)  RR: 20 (13 Aug 2020 07:59) (18 - 20)  SpO2: 96% (13 Aug 2020 09:36) (95% - 96%)    PHYSICAL EXAMINATION:    GENERAL: Ill looking    HEENT: Head is normocephalic and atraumatic. Extraocular muscles are intact. Mucous membranes are moist.    NECK: Supple.    LUNGS: bl crackles    HEART: JANELLE 3/6    ABDOMEN: Soft, nontender, and nondistended.      EXTREMITIES: Without any cyanosis, clubbing, rash, lesions or edema.    NEUROLOGIC: Grossly intact.    SKIN: No ulceration or induration present.      LABS:                        15.1   11.53 )-----------( 330      ( 13 Aug 2020 08:16 )             46.5     08-13    135  |  101  |  22<H>  ----------------------------<  194<H>  4.7   |  21  |  1.0    Ca    8.7      13 Aug 2020 08:16  Phos  3.2     08-12  Mg     2.3     08-13    TPro  5.7<L>  /  Alb  3.0<L>  /  TBili  1.0  /  DBili  0.2  /  AST  24  /  ALT  38  /  AlkPhos  67  08-13              D-Dimer Assay, Quantitative: 1132 ng/mL DDU (08-13-20 @ 08:16)        Procalcitonin, Serum: 0.06 ng/mL (08-10-20 @ 12:07)        08-12-20 @ 07:01  -  08-13-20 @ 07:00  --------------------------------------------------------  IN: 250 mL / OUT: 2600 mL / NET: -2350 mL        MICROBIOLOGY:      MEDICATIONS  (STANDING):  ATENolol  Tablet 25 milliGRAM(s) Oral daily  dexAMETHasone  Injectable 6 milliGRAM(s) IV Push daily  dextrose 5%. 1000 milliLiter(s) (50 mL/Hr) IV Continuous <Continuous>  dextrose 50% Injectable 12.5 Gram(s) IV Push once  dextrose 50% Injectable 25 Gram(s) IV Push once  dextrose 50% Injectable 25 Gram(s) IV Push once  enoxaparin Injectable 70 milliGRAM(s) SubCutaneous every 12 hours  insulin lispro (HumaLOG) corrective regimen sliding scale   SubCutaneous three times a day before meals  pantoprazole    Tablet 40 milliGRAM(s) Oral before breakfast  remdesivir  IVPB 100 milliGRAM(s) IV Intermittent every 24 hours  remdesivir  IVPB   IV Intermittent     MEDICATIONS  (PRN):  dextrose 40% Gel 15 Gram(s) Oral once PRN Blood Glucose LESS THAN 70 milliGRAM(s)/deciliter  glucagon  Injectable 1 milliGRAM(s) IntraMuscular once PRN Glucose LESS THAN 70 milligrams/deciliter      RADIOLOGY & ADDITIONAL STUDIES:

## 2020-08-13 NOTE — PROGRESS NOTE ADULT - SUBJECTIVE AND OBJECTIVE BOX
JOURDAN, ISMET  80y, Male  Allergy: No Known Allergies      LOS  9d    CHIEF COMPLAINT: COVID LRTI (13 Aug 2020 13:20)      INTERVAL EVENTS/HPI  - No acute events overnight. HFNC  - T(F): , Max: 97.5 (08-12-20 @ 16:01)  - Tolerating medication  - WBC Count: 11.53 (08-13-20 @ 08:16)  WBC Count: 10.86 (08-12-20 @ 06:18)  - Creatinine, Serum: 1.0 (08-13-20 @ 08:16)  Creatinine, Serum: 1.1 (08-13-20 @ 08:16)         VITALS:  T(F): 96.1, Max: 97.5 (08-12-20 @ 16:01)  HR: 71  BP: 88/54  RR: 20Vital Signs Last 24 Hrs  T(C): 35.6 (13 Aug 2020 07:59), Max: 36.4 (12 Aug 2020 16:01)  T(F): 96.1 (13 Aug 2020 07:59), Max: 97.5 (12 Aug 2020 16:01)  HR: 71 (13 Aug 2020 09:36) (63 - 97)  BP: 88/54 (13 Aug 2020 07:59) (83/54 - 95/58)  BP(mean): 66 (13 Aug 2020 07:59) (64 - 71)  RR: 20 (13 Aug 2020 07:59) (18 - 20)  SpO2: 96% (13 Aug 2020 09:36) (95% - 96%)      FH: Non-contributory  Social Hx: Non-contributory    TESTS & MEASUREMENTS:                        15.1   11.53 )-----------( 330      ( 13 Aug 2020 08:16 )             46.5     08-13    135  |  101  |  22<H>  ----------------------------<  194<H>  4.7   |  21  |  1.0    Ca    8.7      13 Aug 2020 08:16  Phos  3.2     08-12  Mg     2.3     08-13    TPro  5.7<L>  /  Alb  3.0<L>  /  TBili  1.0  /  DBili  0.2  /  AST  24  /  ALT  38  /  AlkPhos  67  08-13    eGFR if Non African American: 72 mL/min/1.73M2 (08-13-20 @ 08:16)  eGFR if : 83 mL/min/1.73M2 (08-13-20 @ 08:16)  eGFR if : 74 mL/min/1.73M2 (08-13-20 @ 08:16)  eGFR if Non African American: 64 mL/min/1.73M2 (08-13-20 @ 08:16)    LIVER FUNCTIONS - ( 13 Aug 2020 08:16 )  Alb: 3.0 g/dL / Pro: 5.7 g/dL / ALK PHOS: 67 U/L / ALT: 38 U/L / AST: 24 U/L / GGT: x                     INFECTIOUS DISEASES TESTING  Procalcitonin, Serum: 0.06 (08-10-20 @ 12:07)  Procalcitonin, Serum: 0.18 (08-08-20 @ 07:40)  Procalcitonin, Serum: 0.14 (08-07-20 @ 15:50)  Legionella Antigen, Urine: Negative (08-06-20 @ 06:20)  MRSA PCR Result.: Negative (08-06-20 @ 06:20)  Streptococcus Pneumoniae Ag Urine: Negative (08-06-20 @ 06:20)  COVID-19 PCR: Detected (08-05-20 @ 09:56)  Procalcitonin, Serum: 0.24 (08-05-20 @ 05:24)  Procalcitonin, Serum: 0.16 (08-04-20 @ 21:00)  COVID-19 PCR: NotDetec (08-04-20 @ 21:00)      INFLAMMATORY MARKERS  C-Reactive Protein, Serum: 0.93 mg/dL (08-11-20 @ 12:07)  Sedimentation Rate, Erythrocyte: 7 mm/Hr (08-10-20 @ 12:07)  Sedimentation Rate, Erythrocyte: 58 mm/Hr (08-05-20 @ 11:13)  C-Reactive Protein, Serum: 23.41 mg/dL (08-05-20 @ 11:13)      RADIOLOGY & ADDITIONAL TESTS:  I have personally reviewed the last available Chest xray  CXR      CT      CARDIOLOGY TESTING  Transthoracic Echocardiogram:    EXAM:  2-D ECHO (TTE) COMPLETE        PROCEDURE DATE:  08/05/2020      INTERPRETATION:  REPORT:  TRANSTHORACIC ECHOCARDIOGRAM REPORT        Patient Name:   PETE SOLIMAN Accession #: [id number]  Medical Rec #:  [id number]    Height:      62.6 in 159.0cm  YOB: 1940    Weight:      165.0 lb 74.84 kg  Patient Age:    80 years     BSA:         1.77 m²  Patient Gender: M            BP:          100/58 mmHg      Date of Exam:        8/5/2020 12:15:20 PM  Referring Physician: [id number] ED UNASSIGNED  Sonographer:         Quinn Flores  Fellow:              Aby Watters    Reading Physician:   Oliverio Arevalo M.D.    Procedure:   Follow up or Limited Echocardiogram.  Indications: I50.9 - Heart Failure, unspecified  Diagnosis:   Heart failure, unspecified - I50.9        Summary:   1. LV Ejection Fraction by Sanchez's Method with a biplane EF of 68 %.   2. The left ventricular diastolic function could not be assessed in this study.   3. Mild-moderate tricuspid regurgitation.   4. Estimated pulmonary artery systolic pressure is 40.1 mmHg assuming a right atrial pressure of 3 mmHg, which is consistent with mild pulmonary hypertension.    PHYSICIAN INTERPRETATION:  Left Ventricle: The left ventricular internal cavity size is normal. Left ventricular wall thickness is normal. The left ventricular diastolic function could not be assessed in this study.  Right Ventricle: Normal right ventricular size and function.  Left Atrium: Normal left atrial size.  Right Atrium: Normal right atrial size.  Pericardium: There is no evidence of pericardial effusion.  Mitral Valve: No evidence of mitral stenosis. Trace mitral valve regurgitation is seen.  Tricuspid Valve: Mild-moderate tricuspid regurgitation is visualized. Estimated pulmonary artery systolic pressure is 40.1 mmHg assuming a right atrial pressure of 3 mmHg, which is consistent with mild pulmonary hypertension.  Aortic Valve: No evidence of aortic stenosis. Aortic valve thickening with normal leaflet opening. No evidence of aortic valve regurgitation is seen.  Pulmonic Valve: Trace pulmonic valve regurgitation.  Aorta: The aortic root is normal in size and structure.      2D AND M-MODE MEASUREMENTS (normal ranges within parentheses):  Left Ventricle:                  Normal   Aorta/Left Atrium:             Normal  IVSd (2D):              1.00 cm (0.7-1.1) AoV Cusp Separation: 1.89 cm (1.5-2.6)  LVPWd (2D):             0.97 cm (0.7-1.1) Left Atrium (Mmode): 5.28 cm (1.9-4.0)  LVIDd (2D):             4.40 cm (3.4-5.7) Right Ventricle:  LVIDs (2D):             3.08 cm           RVd (2D):        3.26 cm  LV FS (2D):             30.0 %   (>25%)  Relative Wall Thickness  0.44    (<0.42)    SPECTRAL DOPPLER ANALYSIS:  Tricuspid Valve and PA/RV Systolic Pressure: TR Max Velocity: 3.04 m/s RA Pressure: 3 mmHg RVSP/PASP: 40.1 mmHg      [id number] Oliverio Arevalo M.D., Electronically signed on 8/5/2020 at 3:18:12 PM        *** Final ***                  OLIVERIO AREVALO MD  This document has been electronically signed. Aug  5 2020 12:15PM             (08-05-20 @ 12:15)  12 Lead ECG:   Ventricular Rate 118 BPM    Atrial Rate 118 BPM    P-R Interval 126 ms    QRS Duration 90 ms    Q-T Interval 324 ms    QTC Calculation(Bezet) 454 ms    P Axis 43 degrees    R Axis 5 degrees    T Axis 18 degrees    Diagnosis Line Sinus tachycardia  Poor R wave progression  Confirmed by BRADEN GENTILE MD ([id number]) on 8/5/2020 12:03:30 PM (08-04-20 @ 21:09)      MEDICATIONS  ATENolol  Tablet 25 Oral daily  dexAMETHasone  Injectable 6 IV Push daily  dextrose 5%. 1000 IV Continuous <Continuous>  dextrose 50% Injectable 12.5 IV Push once  dextrose 50% Injectable 25 IV Push once  dextrose 50% Injectable 25 IV Push once  enoxaparin Injectable 70 SubCutaneous every 12 hours  insulin lispro (HumaLOG) corrective regimen sliding scale  SubCutaneous three times a day before meals  pantoprazole    Tablet 40 Oral before breakfast  remdesivir  IVPB 100 IV Intermittent every 24 hours  remdesivir  IVPB  IV Intermittent       WEIGHT  Weight (kg): 74.8 (08-04-20 @ 20:34)  Creatinine, Serum: 1.0 mg/dL (08-13-20 @ 08:16)  Creatinine, Serum: 1.1 mg/dL (08-13-20 @ 08:16)      ANTIBIOTICS:  remdesivir  IVPB 100 milliGRAM(s) IV Intermittent every 24 hours  remdesivir  IVPB   IV Intermittent       All available historical records have been reviewed

## 2020-08-13 NOTE — PROGRESS NOTE ADULT - SUBJECTIVE AND OBJECTIVE BOX
79 y/o M HTN, DM, DLD, CAD? , conduction abnormality s/p dual chamber pacemaker, hx of DVT on eliquis patient, presented with AHRF 2ry to COVID pna, hospital course complicated by acute dvt     #AHRF 2ry to COVID  -s/p 8/5 2 units convalescent plasma, s/p toci  -HFNC 70%, decrease today to 60%   -procalcitonin unremarkable, off Abx   -c/w  remdesivir d8  -c/w dexamethasone d8     #Hx of DVt was on Eliquis  with Acute DVT while on AC SP GFF 8/5  c/w therapeutic Lovenox for now    # DLD/ DM 2/ HTN  - Continue atenolol    # Dual chamber pacemaker  - Reason for implant uncertain  - ECG sinus rhythm, not requiring pace backup at time of admission    # Diet > DASH/TLC  # DVT  On Lovenox therapeutic SUBJECTIVE:    Patient is a 80y old Male who presents with a chief complaint of COVID LRTI (13 Aug 2020 13:29)    Currently admitted to medicine with the primary diagnosis of COVID-19     Today is hospital day 9d. This morning he is resting comfortably in bed and reports no new issues or overnight events. He reports improved breathing    PAST MEDICAL & SURGICAL HISTORY  Bradycardia  Hypertension  Artificial pacemaker    SOCIAL HISTORY:  Negative for smoking/alcohol/drug use.     ALLERGIES:  No Known Allergies    MEDICATIONS:  STANDING MEDICATIONS  ATENolol  Tablet 25 milliGRAM(s) Oral daily  dexAMETHasone  Injectable 6 milliGRAM(s) IV Push daily  dextrose 5%. 1000 milliLiter(s) IV Continuous <Continuous>  dextrose 50% Injectable 12.5 Gram(s) IV Push once  dextrose 50% Injectable 25 Gram(s) IV Push once  dextrose 50% Injectable 25 Gram(s) IV Push once  enoxaparin Injectable 70 milliGRAM(s) SubCutaneous every 12 hours  insulin lispro (HumaLOG) corrective regimen sliding scale   SubCutaneous three times a day before meals  pantoprazole    Tablet 40 milliGRAM(s) Oral before breakfast  remdesivir  IVPB 100 milliGRAM(s) IV Intermittent every 24 hours  remdesivir  IVPB   IV Intermittent     PRN MEDICATIONS  dextrose 40% Gel 15 Gram(s) Oral once PRN  glucagon  Injectable 1 milliGRAM(s) IntraMuscular once PRN    VITALS:   T(F): 96.1  HR: 71  BP: 88/54  RR: 20  SpO2: 96%    LABS:                        15.1   11.53 )-----------( 330      ( 13 Aug 2020 08:16 )             46.5     08-13    135  |  101  |  22<H>  ----------------------------<  194<H>  4.7   |  21  |  1.0    Ca    8.7      13 Aug 2020 08:16  Phos  3.2     08-12  Mg     2.3     08-13    TPro  5.7<L>  /  Alb  3.0<L>  /  TBili  1.0  /  DBili  0.2  /  AST  24  /  ALT  38  /  AlkPhos  67  08-13                  RADIOLOGY:  < from: Xray Chest 1 View- PORTABLE-Routine (08.13.20 @ 06:12) >    EXAM:  XR CHEST PORTABLE ROUTINE 1V            PROCEDURE DATE:  08/13/2020            INTERPRETATION:  Clinical History / Reason for exam: Pneumonia follow-up. Covid 19.    Comparison : Chest radiograph 8/11/2020.    Technique/Positioning: Frontal view of the chest.    Findings:    Support devices: None.    Cardiac/mediastinum/hilum: Stable cardiomediastinal silhouette.    Lung parenchyma/Pleura: Bilateral mid to lower lung opacities without significant change. No pneumothorax or pleural effusion.    Skeleton/soft tissues: Stable.    Impression:    No significant interval change.    < end of copied text >    PHYSICAL EXAM:  GEN: No acute distress  LUNGS: Crackles bilaterally   HEART: Regular  ABD: Soft, non-tender, non-distended.  EXT: NC/NC/NE/2+PP/MARC/Skin Intact.   NEURO: AAOX3    Intravenous access:   NG tube:   Saleem Catheter:

## 2020-08-13 NOTE — PROGRESS NOTE ADULT - ASSESSMENT
80y M HTN, HLD, CAD?, conduction abnormality sp Dual chamber pacemaker admitted with COVID, tested + as an outpatient about 1 week prior.       IMPRESSION  # COVID-19 PNA with sepsis on admission    Rule out PE     LE Duplex: + DVTs s/p IVC filter 8/6    Procalcitonin, Serum: 0.16 (08-04-20 @ 21:00)- not consistent with bacterial infection    CXR bilateral airspace opacities     WBC 17 on admission  #Cytokine Release Syndrome   D-Dimer Assay, Quantitative: 3614 ng/mL DDU (08-06-20 @ 08:13)  #Transaminitis   #Lactic acidosis    RECOMMENDATIONS  - procalcitonin unremarkable , off antibiotics  - Remdesivir 100mg daily x 10 days given continued hypoxemia Monitor Cr, LFTS daily (cutoff 205)  - Decadron 6mg daily IV x 10 days  - s/p 8/5 Tocilizumab 400mg x1  - s/p 8/5 2 units convalescent plasma  - Therapeutic A/C  - Recommend prone positioning if possible     Please call with any questions or send a message on [employer] Teams  Spectra [phone]

## 2020-08-13 NOTE — PROGRESS NOTE ADULT - ASSESSMENT
79 y/o M HTN, DM, DLD, CAD? , conduction abnormality s/p dual chamber pacemaker, hx of DVT on eliquis patient, presented with AHRF 2ry to [facility], hospital course complicated by acute dvt     [facility]  -On admission: CRP 19.62, D-dimer 85387, procal 0.16, ferritin 1570,   -Repeat 8/10-8/11 CRP 0.93 D-dimer 1132 procal 0.06 ferritin 1625,   -CXR 8/4: Patchy bilateral airspace opacities  -CXR 8/13 unchanged  -s/p 8/5 2 units convalescent plasma, s/p toci  -HFNC 70%, decrease today to 60%   -S/p Tocilizumab 400mg 8/5  -S/p Remdesivir 200mg x1   -Continue Remdesivir 100mg qd x 10 days (Today is day 8)  -Continue Dexamethasone 6mg IV qd x10 days (today is day 8)  -Monitor liver enzymes, daily CMP cutoff >205  -ID following  -Pulm recs noted  -c/w lovenox 40 q12    #Hx of DVt was on Eliquis  with Acute DVT while on AC SP GFF 8/5  c/w therapeutic Lovenox for now    # DLD/ DM 2/ HTN  - Continue atenolol    # Dual chamber pacemaker  - Reason for implant uncertain  - ECG sinus rhythm, not requiring pace backup at time of admission    # Diet > DASH/TLC  # DVT  On Lovenox therapeutic 79 y/o M HTN, DM, DLD, CAD? , conduction abnormality s/p dual chamber pacemaker, hx of DVT on eliquis patient, presented with AHRF 2ry to COVID pna, hospital course complicated by acute dvt     #AHRF 2ry to COVID  -On admission: CRP 19.62, D-dimer 63058, procal 0.16, ferritin 1570,   -Repeat 8/10-8/11 CRP 0.93 D-dimer 1132 procal 0.06 ferritin 1625,   -CXR 8/4: Patchy bilateral airspace opacities  -CXR 8/13 unchanged  -s/p 8/5 2 units convalescent plasma, s/p toci  -HFNC 70%, decrease today to 60%--> HHFNC increased to 95 and patient still saturating in the 80s. Will put on BIPAP and check ABG. If needed patient and family has consented to intubation and if vented patient is approved for ICU admission by Dr. Wisdom   -S/p Tocilizumab 400mg 8/5  -S/p Remdesivir 200mg x1   -Continue Remdesivir 100mg qd x 10 days (Today is day 8)  -Continue Dexamethasone 6mg IV qd x10 days (today is day 8)  -Monitor liver enzymes, daily CMP cutoff >205  -ID following  -Pulm recs noted  -c/w lovenox 40 q12    #Hx of DVt was on Eliquis  with Acute DVT while on AC SP GFF 8/5  c/w therapeutic Lovenox for now    # DLD/ DM 2/ HTN  - Continue atenolol    # Dual chamber pacemaker  - Reason for implant uncertain  - ECG sinus rhythm, not requiring pace backup at time of admission    # Diet > DASH/TLC  # DVT  On Lovenox therapeutic

## 2020-08-14 NOTE — PROGRESS NOTE ADULT - ASSESSMENT
79 y/o M HTN, DM, DLD, CAD? , conduction abnormality s/p dual chamber pacemaker, hx of DVT on eliquis patient, presented with AHRF 2ry to [facility], hospital course complicated by acute dvt     #AHRF 2ry to COVID  -On admission: CRP 19.62, D-dimer 05655, procal 0.16, ferritin 1570,   -Repeat 8/10-8/11 CRP 0.93 D-dimer 1132 procal 0.06 ferritin 1625,   -CXR 8/4: Patchy bilateral airspace opacities  -CXR 8/14 pending  -s/p 8/5 2 units convalescent plasma, s/p toci  -HFNC 100% today; Will decrease  -S/p Tocilizumab 400mg 8/5  -S/p Remdesivir 200mg x1   -Continue Remdesivir 100mg qd x 10 days (Today is day 9)  -Continue Dexamethasone 6mg IV qd x10 days (today is day 9)  -Monitor liver enzymes, daily CMP cutoff >205  -ID following  -Pulm recs noted  -c/w lovenox 40 q12  -Pan cultures sent last night after patient desaturated---> f/u pan cultures  -C/w meropenem for now  -f/u procalcitonin    #Hx of DVt was on Eliquis  with Acute DVT while on AC SP GFF 8/5  c/w therapeutic Lovenox for now    # DLD/ DM 2/ HTN  - Continue atenolol    # Dual chamber pacemaker  - Reason for implant uncertain  - ECG sinus rhythm, not requiring pace backup at time of admission    # Diet > DASH/TLC  # DVT  On Lovenox therapeutic 81 y/o M HTN, DM, DLD, CAD? , conduction abnormality s/p dual chamber pacemaker, hx of DVT on eliquis patient, presented with AHRF 2ry to [facility], hospital course complicated by acute dvt     #AHRF 2ry to COVID  -On admission: CRP 19.62, D-dimer 68211, procal 0.16, ferritin 1570,   -Repeat 8/10-8/11 CRP 0.93 D-dimer 1132 procal 0.06 ferritin 1625,   -CXR 8/4: Patchy bilateral airspace opacities  -CXR 8/14 unchanged  -s/p 8/5 2 units convalescent plasma, s/p toci  -HFNC 100% today; Will decrease  -S/p Tocilizumab 400mg 8/5  -S/p Remdesivir 200mg x1   -Continue Remdesivir 100mg qd x 10 days (Today is day 9)  -Continue Dexamethasone 6mg IV qd x10 days (today is day 9)  -Monitor liver enzymes, daily CMP cutoff >205  -ID following  -Pulm recs noted  -c/w lovenox 40 q12  -Pan cultures sent last night after patient desaturated---> f/u pan cultures  -C/w meropenem for now  -f/u procalcitonin    #Hx of DVt was on Eliquis  with Acute DVT while on AC SP GFF 8/5  c/w therapeutic Lovenox for now    # DLD/ DM 2/ HTN  - Continue atenolol    # Dual chamber pacemaker  - Reason for implant uncertain  - ECG sinus rhythm, not requiring pace backup at time of admission    # Diet > DASH/TLC  # DVT  On Lovenox therapeutic

## 2020-08-14 NOTE — PROGRESS NOTE ADULT - ASSESSMENT
IMPRESSION:    Acute Hypoxemic Respiratory Failure   Sepsis present on admission   COVID-19 PNA  HO DVT was on Eliquis.   Acute DVT while on AC SP GFF    PLAN:    CNS: Avoid CNS depressants.      HEENT: Oral care    PULMONARY: keep sat >88%.   HOB @ 45 degrees. Aspiration Precautions. Wean O2 as tolerated     CARDIOVASCULAR: Avoid volume overload . keep equal to neg balance    GI: GI prophylaxis.  Feeding as tolerated      RENAL:  Follow up lytes.  Correct as needed.    INFECTIOUS DISEASE:  per ID, Agree with DC ABX     HEMATOLOGICAL:  therapeutic Lovenox q12,      ENDOCRINE:  Follow up FS.  Insulin protocol if needed.    MUSCULOSKELETAL: Bedrest    Prognosis guarded     Continue monitoring

## 2020-08-14 NOTE — PROGRESS NOTE ADULT - SUBJECTIVE AND OBJECTIVE BOX
Patient is a 80y old  Male who presents with a chief complaint of COVID LRTI (14 Aug 2020 10:06)        Over Night Events:  on HFNCO2.  on 100% .  comfortable         ROS:     All ROS are negative except HPI         PHYSICAL EXAM    ICU Vital Signs Last 24 Hrs  T(C): 36.1 (14 Aug 2020 15:51), Max: 36.2 (13 Aug 2020 23:34)  T(F): 96.9 (14 Aug 2020 15:51), Max: 97.1 (13 Aug 2020 23:34)  HR: 74 (14 Aug 2020 15:51) (60 - 91)  BP: 103/56 (14 Aug 2020 15:51) (103/56 - 105/66)  BP(mean): 73 (14 Aug 2020 15:51) (73 - 107)  ABP: --  ABP(mean): --  RR: 18 (14 Aug 2020 15:51) (18 - 18)  SpO2: 97% (14 Aug 2020 16:08) (90% - 97%)      CONSTITUTIONAL:  Well nourished.  NAD    ENT:   Airway patent,   Mouth with normal mucosa.   No thrush    EYES:   Pupils equal,   Round and reactive to light.    CARDIAC:   Normal rate,   Regular rhythm.    No edema      Vascular:  Normal systolic impulse  No Carotid bruits    RESPIRATORY:   Bilateral crackles   Bilateral BS  Normal chest expansion  Not tachypneic,  No use of accessory muscles    GASTROINTESTINAL:  Abdomen soft,   Non-tender,   No guarding,   + BS    MUSCULOSKELETAL:   Range of motion is not limited,  No clubbing, cyanosis    NEUROLOGICAL:   Alert and oriented   No motor  deficits.    SKIN:   Skin normal color for race,   Warm and dry and intact.   No evidence of rash.    PSYCHIATRIC:   Normal mood and affect.   No apparent risk to self or others.    HEMATOLOGICAL:  No cervical  lymphadenopathy.  no inguinal lymphadenopathy      08-13-20 @ 07:01  -  08-14-20 @ 07:00  --------------------------------------------------------  IN:  Total IN: 0 mL    OUT:    Voided: 1450 mL  Total OUT: 1450 mL    Total NET: -1450 mL          LABS:                            15.2   15.95 )-----------( 308      ( 14 Aug 2020 07:43 )             45.4                                               08-14    136  |  98  |  23<H>  ----------------------------<  157<H>  4.6   |  25  |  1.1    Ca    8.6      14 Aug 2020 07:43  Mg     2.2     08-14    TPro  5.8<L>  /  Alb  3.1<L>  /  TBili  0.7  /  DBili  0.2  /  AST  20  /  ALT  33  /  AlkPhos  71  08-14                                                                                           LIVER FUNCTIONS - ( 14 Aug 2020 07:43 )  Alb: 3.1 g/dL / Pro: 5.8 g/dL / ALK PHOS: 71 U/L / ALT: 33 U/L / AST: 20 U/L / GGT: x                                                                                                                                   ABG - ( 13 Aug 2020 17:11 )  pH, Arterial: 7.49  pH, Blood: x     /  pCO2: 33    /  pO2: 55    / HCO3: 25    / Base Excess: 2.2   /  SaO2: 90                  MEDICATIONS  (STANDING):  ATENolol  Tablet 25 milliGRAM(s) Oral daily  dexAMETHasone  Injectable 6 milliGRAM(s) IV Push daily  dextrose 5%. 1000 milliLiter(s) (50 mL/Hr) IV Continuous <Continuous>  dextrose 50% Injectable 12.5 Gram(s) IV Push once  dextrose 50% Injectable 25 Gram(s) IV Push once  dextrose 50% Injectable 25 Gram(s) IV Push once  enoxaparin Injectable 70 milliGRAM(s) SubCutaneous every 12 hours  insulin lispro (HumaLOG) corrective regimen sliding scale   SubCutaneous three times a day before meals  meropenem  IVPB      meropenem  IVPB 1000 milliGRAM(s) IV Intermittent every 8 hours  pantoprazole    Tablet 40 milliGRAM(s) Oral before breakfast    MEDICATIONS  (PRN):  dextrose 40% Gel 15 Gram(s) Oral once PRN Blood Glucose LESS THAN 70 milliGRAM(s)/deciliter  glucagon  Injectable 1 milliGRAM(s) IntraMuscular once PRN Glucose LESS THAN 70 milligrams/deciliter      New X-rays reviewed:                                                                                  ECHO    CXR interpreted by me:

## 2020-08-14 NOTE — PROGRESS NOTE ADULT - SUBJECTIVE AND OBJECTIVE BOX
JOURDAN, ISMET  80y, Male  Allergy: No Known Allergies      LOS  10d    CHIEF COMPLAINT: COVID LRTI (13 Aug 2020 13:29)      INTERVAL EVENTS/HPI  - HFNC 60%  - T(F): , Max: 97.1 (08-13-20 @ 23:34)  - WBC Count: 11.53 (08-13-20 @ 08:16)  WBC Count: 10.86 (08-12-20 @ 06:18)  - Creatinine, Serum: 1.0 (08-13-20 @ 08:16)  Creatinine, Serum: 1.1 (08-13-20 @ 08:16)       VITALS:  T(F): 97.1, Max: 97.1 (08-13-20 @ 23:34)  HR: 91  BP: 105/66  RR: 18Vital Signs Last 24 Hrs  T(C): 36.2 (13 Aug 2020 23:34), Max: 36.2 (13 Aug 2020 23:34)  T(F): 97.1 (13 Aug 2020 23:34), Max: 97.1 (13 Aug 2020 23:34)  HR: 91 (13 Aug 2020 23:34) (63 - 91)  BP: 105/66 (13 Aug 2020 23:34) (88/54 - 105/66)  BP(mean): 107 (13 Aug 2020 23:34) (66 - 107)  RR: 18 (13 Aug 2020 23:34) (18 - 20)  SpO2: 96% (13 Aug 2020 23:36) (90% - 96%)    FH: Non-contributory  Social Hx: Non-contributory    TESTS & MEASUREMENTS:                        15.1   11.53 )-----------( 330      ( 13 Aug 2020 08:16 )             46.5     08-13    135  |  101  |  22<H>  ----------------------------<  194<H>  4.7   |  21  |  1.0    Ca    8.7      13 Aug 2020 08:16  Mg     2.3     08-13    TPro  5.7<L>  /  Alb  3.0<L>  /  TBili  1.0  /  DBili  0.2  /  AST  24  /  ALT  38  /  AlkPhos  67  08-13    eGFR if Non African American: 72 mL/min/1.73M2 (08-13-20 @ 08:16)  eGFR if : 83 mL/min/1.73M2 (08-13-20 @ 08:16)  eGFR if : 74 mL/min/1.73M2 (08-13-20 @ 08:16)  eGFR if Non African American: 64 mL/min/1.73M2 (08-13-20 @ 08:16)    LIVER FUNCTIONS - ( 13 Aug 2020 08:16 )  Alb: 3.0 g/dL / Pro: 5.7 g/dL / ALK PHOS: 67 U/L / ALT: 38 U/L / AST: 24 U/L / GGT: x                     INFECTIOUS DISEASES TESTING  Procalcitonin, Serum: 0.04 (08-13-20 @ 08:16)  Procalcitonin, Serum: 0.06 (08-10-20 @ 12:07)  Procalcitonin, Serum: 0.18 (08-08-20 @ 07:40)  Procalcitonin, Serum: 0.14 (08-07-20 @ 15:50)  Legionella Antigen, Urine: Negative (08-06-20 @ 06:20)  MRSA PCR Result.: Negative (08-06-20 @ 06:20)  Streptococcus Pneumoniae Ag Urine: Negative (08-06-20 @ 06:20)  COVID-19 PCR: Detected (08-05-20 @ 09:56)  Procalcitonin, Serum: 0.24 (08-05-20 @ 05:24)  Procalcitonin, Serum: 0.16 (08-04-20 @ 21:00)  COVID-19 PCR: NotDetec (08-04-20 @ 21:00)      INFLAMMATORY MARKERS  C-Reactive Protein, Serum: 0.93 mg/dL (08-11-20 @ 12:07)  Sedimentation Rate, Erythrocyte: 7 mm/Hr (08-10-20 @ 12:07)  Sedimentation Rate, Erythrocyte: 58 mm/Hr (08-05-20 @ 11:13)  C-Reactive Protein, Serum: 23.41 mg/dL (08-05-20 @ 11:13)      RADIOLOGY & ADDITIONAL TESTS:  I have personally reviewed the last available Chest xray  CXR      CT      CARDIOLOGY TESTING  12 Lead ECG:   Ventricular Rate 94 BPM    Atrial Rate 94 BPM    P-R Interval 164 ms    QRS Duration 90 ms    Q-T Interval 374 ms    QTC Calculation(Bezet) 467 ms    P Axis 35 degrees    R Axis -33 degrees    T Axis 35 degrees    Diagnosis Line Normal sinus rhythm  Left axis deviation  Anterior infarct , age undetermined  Abnormal ECG    Confirmed by Jadon Benoit ([id number]) on 8/13/2020 6:30:12 PM (08-13-20 @ 17:09)  Transthoracic Echocardiogram:    EXAM:  2-D ECHO (TTE) COMPLETE        PROCEDURE DATE:  08/05/2020      INTERPRETATION:  REPORT:  TRANSTHORACIC ECHOCARDIOGRAM REPORT        Patient Name:   PETE SOLIMAN Accession #: [id number]  Medical Rec #:  [id number]    Height:      62.6 in 159.0cm  YOB: 1940    Weight:      165.0 lb 74.84 kg  Patient Age:    80 years     BSA:         1.77 m²  Patient Gender: M            BP:          100/58 mmHg      Date of Exam:        8/5/2020 12:15:20 PM  Referring Physician: [id number] ED UNASSIGNED  Sonographer:         Quinn Flores  Fellow:              Aby Watters    Reading Physician:   Oliverio Arevalo M.D.    Procedure:   Follow up or Limited Echocardiogram.  Indications: I50.9 - Heart Failure, unspecified  Diagnosis:   Heart failure, unspecified - I50.9        Summary:   1. LV Ejection Fraction by Sanchez's Method with a biplane EF of 68 %.   2. The left ventricular diastolic function could not be assessed in this study.   3. Mild-moderate tricuspid regurgitation.   4. Estimated pulmonary artery systolic pressure is 40.1 mmHg assuming a right atrial pressure of 3 mmHg, which is consistent with mild pulmonary hypertension.    PHYSICIAN INTERPRETATION:  Left Ventricle: The left ventricular internal cavity size is normal. Left ventricular wall thickness is normal. The left ventricular diastolic function could not be assessed in this study.  Right Ventricle: Normal right ventricular size and function.  Left Atrium: Normal left atrial size.  Right Atrium: Normal right atrial size.  Pericardium: There is no evidence of pericardial effusion.  Mitral Valve: No evidence of mitral stenosis. Trace mitral valve regurgitation is seen.  Tricuspid Valve: Mild-moderate tricuspid regurgitation is visualized. Estimated pulmonary artery systolic pressure is 40.1 mmHg assuming a right atrial pressure of 3 mmHg, which is consistent with mild pulmonary hypertension.  Aortic Valve: No evidence of aortic stenosis. Aortic valve thickening with normal leaflet opening. No evidence of aortic valve regurgitation is seen.  Pulmonic Valve: Trace pulmonic valve regurgitation.  Aorta: The aortic root is normal in size and structure.      2D AND M-MODE MEASUREMENTS (normal ranges within parentheses):  Left Ventricle:                  Normal   Aorta/Left Atrium:             Normal  IVSd (2D):              1.00 cm (0.7-1.1) AoV Cusp Separation: 1.89 cm (1.5-2.6)  LVPWd (2D):             0.97 cm (0.7-1.1) Left Atrium (Mmode): 5.28 cm (1.9-4.0)  LVIDd (2D):             4.40 cm (3.4-5.7) Right Ventricle:  LVIDs (2D):             3.08 cm           RVd (2D):        3.26 cm  LV FS (2D):             30.0 %   (>25%)  Relative Wall Thickness  0.44    (<0.42)    SPECTRAL DOPPLER ANALYSIS:  Tricuspid Valve and PA/RV Systolic Pressure: TR Max Velocity: 3.04 m/s RA Pressure: 3 mmHg RVSP/PASP: 40.1 mmHg      [id number] Oliverio Arevalo M.D., Electronically signed on 8/5/2020 at 3:18:12 PM        *** Final ***                  OLIVERIO AREVALO MD  This document has been electronically signed. Aug  5 2020 12:15PM             (08-05-20 @ 12:15)      MEDICATIONS  ATENolol  Tablet 25 Oral daily  dexAMETHasone  Injectable 6 IV Push daily  dextrose 5%. 1000 IV Continuous <Continuous>  dextrose 50% Injectable 12.5 IV Push once  dextrose 50% Injectable 25 IV Push once  dextrose 50% Injectable 25 IV Push once  enoxaparin Injectable 70 SubCutaneous every 12 hours  insulin lispro (HumaLOG) corrective regimen sliding scale  SubCutaneous three times a day before meals  meropenem  IVPB     meropenem  IVPB 1000 IV Intermittent every 8 hours  pantoprazole    Tablet 40 Oral before breakfast      WEIGHT  Weight (kg): 74.8 (08-04-20 @ 20:34)  Creatinine, Serum: 1.0 mg/dL (08-13-20 @ 08:16)  Creatinine, Serum: 1.1 mg/dL (08-13-20 @ 08:16)      ANTIBIOTICS:  meropenem  IVPB      meropenem  IVPB 1000 milliGRAM(s) IV Intermittent every 8 hours      All available historical records have been reviewed

## 2020-08-14 NOTE — PROGRESS NOTE ADULT - NSREFPHYEXINPTDOCREFER_GEN_ALL_CORE
my previous exam; resident/hospitalist AM exam

## 2020-08-14 NOTE — PROGRESS NOTE ADULT - ASSESSMENT
80y M HTN, HLD, CAD?, conduction abnormality sp Dual chamber pacemaker admitted with COVID, tested + as an outpatient about 1 week prior.       IMPRESSION  # COVID-19 PNA with sepsis on admission    Rule out PE     LE Duplex: + DVTs s/p IVC filter 8/6    Procalcitonin, Serum: 0.16 (08-04-20 @ 21:00)- not consistent with bacterial infection    CXR bilateral airspace opacities     WBC 17 on admission  #Cytokine Release Syndrome   D-Dimer Assay, Quantitative: 3614 ng/mL DDU (08-06-20 @ 08:13)  #Transaminitis   #Lactic acidosis    RECOMMENDATIONS  - D/C meropenem. afebrile, No leukocytosis, CXR improving, Procalcitonin, Serum: 0.04 (08-13-20 @ 08:16)- not consistent with bacterial infection  - s/p Remdesivir 100mg daily x 10 days   - Decadron 6mg daily IV x 10 days  - s/p 8/5 Tocilizumab 400mg x1  - s/p 8/5 2 units convalescent plasma  - Therapeutic A/C  - Recommend prone positioning if possible     Please call with any questions or send a message on [employer] Teams  Spectra [phone]

## 2020-08-15 NOTE — PROGRESS NOTE ADULT - SUBJECTIVE AND OBJECTIVE BOX
ICU  Attending Progress Daily Note     15 Aug 2020 14:50    He has history of Bradycardia  Hypertension    Interval event for past 24 hr:  PETE SOLIMAN  80y had no event.   Current Complains:  PETE SOLIMAN has no new complains  HPI:  80 year old male patient, transferred from [facility] for COVID-19 LRTI.    Known hypertensive, dyslipidemic, type II diabetic, CAD?, conduction abnormality sp Dual chamber pacemaker.     Current history goes back to 8 days ago when the patient began experiencing fever, cough and worsening dyspnea at rest.   Patient diagnosed with COVID-19 LRTI as out patient. Presented 1 day ptp to [facility] for worsening dyspnea, admitted to [facility] for further management. (05 Aug 2020 03:14)    OBJECTIVE:  ICU Vital Signs Last 24 Hrs  T(C): 36.2 (15 Aug 2020 00:01), Max: 36.2 (15 Aug 2020 00:01)  T(F): 97.1 (15 Aug 2020 00:01), Max: 97.1 (15 Aug 2020 00:01)  HR: 67 (15 Aug 2020 07:20) (67 - 74)  BP: 94/54 (15 Aug 2020 00:01) (94/54 - 103/56)  BP(mean): 69 (15 Aug 2020 00:01) (69 - 73)  ABP: --  ABP(mean): --  RR: 18 (15 Aug 2020 00:01) (18 - 18)  SpO2: 96% (15 Aug 2020 09:47) (96% - 99%)    I&O's Summary    14 Aug 2020 07:01  -  15 Aug 2020 07:00  --------------------------------------------------------  IN: 0 mL / OUT: 1200 mL / NET: -1200 mL      I&O's Detail    14 Aug 2020 07:01  -  15 Aug 2020 07:00  --------------------------------------------------------  IN:  Total IN: 0 mL    OUT:    Voided: 1200 mL  Total OUT: 1200 mL    Total NET: -1200 mL            CAPILLARY BLOOD GLUCOSE      POCT Blood Glucose.: 229 mg/dL (15 Aug 2020 11:27)  POCT Blood Glucose.: 125 mg/dL (15 Aug 2020 05:19)  POCT Blood Glucose.: 162 mg/dL (14 Aug 2020 16:51)    LABS:  ABG - ( 13 Aug 2020 17:11 )  pH, Arterial: 7.49  pH, Blood: x     /  pCO2: 33    /  pO2: 55    / HCO3: 25    / Base Excess: 2.2   /  SaO2: 90                                      14.9   16.97 )-----------( 226      ( 15 Aug 2020 07:24 )             45.4     08-15    135  |  102  |  25<H>  ----------------------------<  150<H>  4.9   |  25  |  1.0    Ca    8.4<L>      15 Aug 2020 07:24  Mg     2.2     08-15    TPro  5.5<L>  /  Alb  3.0<L>  /  TBili  0.9  /  DBili  x   /  AST  16  /  ALT  28  /  AlkPhos  68  08-15          Culture - Blood (collected 13 Aug 2020 18:52)  Source: .Blood Blood  Preliminary Report (15 Aug 2020 02:02):    No growth to date.      Home Medications:  atenolol 25 mg oral tablet: 1 tab(s) orally once a day (04 Aug 2020 21:58)  doxycycline hyclate 100 mg oral capsule: 1 cap(s) orally 2 times a day (04 Aug 2020 21:58)  Eliquis 5 mg oral tablet:  (04 Aug 2020 21:58)  pantoprazole 40 mg oral delayed release tablet:  (04 Aug 2020 21:58)  Vitamin D3:  (04 Aug 2020 21:58)    HOSPITAL MEDICATIONS:  MEDICATIONS  (STANDING):  ATENolol  Tablet 25 milliGRAM(s) Oral daily  dexAMETHasone  Injectable 6 milliGRAM(s) IV Push daily  dextrose 5%. 1000 milliLiter(s) (50 mL/Hr) IV Continuous <Continuous>  dextrose 50% Injectable 12.5 Gram(s) IV Push once  dextrose 50% Injectable 25 Gram(s) IV Push once  dextrose 50% Injectable 25 Gram(s) IV Push once  enoxaparin Injectable 70 milliGRAM(s) SubCutaneous every 12 hours  insulin lispro (HumaLOG) corrective regimen sliding scale   SubCutaneous three times a day before meals  pantoprazole    Tablet 40 milliGRAM(s) Oral before breakfast    MEDICATIONS  (PRN):  dextrose 40% Gel 15 Gram(s) Oral once PRN Blood Glucose LESS THAN 70 milliGRAM(s)/deciliter  glucagon  Injectable 1 milliGRAM(s) IntraMuscular once PRN Glucose LESS THAN 70 milligrams/deciliter      REVIEW OF SYSTEMS:  CONSTITUTIONAL: [X] all negative; [ ] weakness, [ ] fevers, [ ] chills  EYES/ENT: [X] all negative; [ ] visual changes, [ ] vertigo, [ ] throat pain   NECK: [X] all negative; [ ] pain, [ ] stiffness  RESPIRATORY: [] all negative, [ ] cough, [ ] wheezing, [ ] hemoptysis, [ ] shortness of breath  CARDIOVASCULAR: [] all negative; [ ] chest pain, [ ] palpitations, [ ] orthopnea  GASTROINTESTINAL: [X] all negative; [ ]abdominal pain, [ ] nausea, [ ] vomiting, [ ] hematemesis, [ ] diarrhea, [ ] constipation, [ ] melena, [ ] hematochezia.  GENITOURINARY: [X] all negative; [ ] dysuria, [ ] frequency, [ ] hematuria  NEUROLOGICAL: [X] all negative; [ ] numbness, [ ] weakness  SKIN: [X] all negative; [ ] itching, [ ] burning, [ ] rashes, [ ] lesions   All other review of systems is negative unless indicated above.    [  ] Unable to assess ROS because     PHYSICAL EXAM:          CONSTITUTIONAL: Well-developed; well-nourished; in no acute distress.   	SKIN: warm, dry  	HEAD: Normocephalic; atraumatic.  	EYES: PERRL, EOM, no conj injection, sclera clear  	ENT: No nasal discharge; airway clear.  	NECK: Supple; non tender.  No midline ttp ctls  	CARD: S1, S2 normal; no murmurs, gallops, or rubs. Regular rate and rhythm. 2+ RPs and DPs bilat, no carotid bruits, no pedal   edema, no calf pain b/l  	RESP: CTA  bilat good air movement No wheezes, rales or rhonchi.  	ABD: Soft, not tender, not distended, no CVA ttp no rebound or guarding, bowel sounds present  	EXT: Normal ROM.  No clubbing, cyanosis or edema.   	  	NEURO: Alert, awake, motor 5/5 R, 5/5 L        RADIOLOGY:  xray  < from: Xray Chest 1 View- PORTABLE-Routine (08.15.20 @ 05:18) >  Impression:    Bilateral opacities, stable.    < end of copied text >    I spent 45 minutes of critical care time examining patient, reviewing vitals, labs, medications, imaging and discussing with the team goals of care to prevent life-threatening in this patient who is at high risk for deterioration or death due to:

## 2020-08-16 NOTE — PROGRESS NOTE ADULT - ASSESSMENT
79 y/o M HTN, DM, DLD, CAD? , conduction abnormality s/p dual chamber pacemaker, hx of DVT on eliquis patient, presented with AHRF 2ry to [facility], hospital course complicated by acute dvt     #AHRF 2ry to COVID  -On admission: CRP 19.62, D-dimer 06339, procal 0.16, ferritin 1570,   -Repeat 8/10-8/11 CRP 0.93 D-dimer 1132 procal 0.06 ferritin 1625,   -CXR 8/4: Patchy bilateral airspace opacities  -CXR 8/16 unchanged  -s/p 8/5 2 units convalescent plasma, s/p toci  -HFNC 60L/70% today  -S/p Tocilizumab 400mg 8/5  -S/p Remdesivir 200mg x1   -s/p Remdesivir 100mg qd x 10 days (Last day 8/14)  -s/p Dexamethasone 6mg IV qd x10 days (Last day 8/16)  -Monitor liver enzymes, daily CMP cutoff >205  -ID following  -Pulm recs noted  -c/w lovenox 40 q12  -Blood cultures NGTD  -s/p meropenem     #Hx of DVt was on Eliquis  with Acute DVT while on AC SP GFF 8/5  c/w therapeutic Lovenox for now    # DLD/ DM 2/ HTN  - Continue atenolol    # Dual chamber pacemaker  - Reason for implant uncertain  - ECG sinus rhythm, not requiring pace backup at time of admission    # Diet > DASH/TLC  # DVT  On Lovenox therapeutic

## 2020-08-16 NOTE — PROGRESS NOTE ADULT - SUBJECTIVE AND OBJECTIVE BOX
SUBJECTIVE:    Patient is a 80y old Male who presents with a chief complaint of COVID LRTI (15 Aug 2020 14:50)    Currently admitted to medicine with the primary diagnosis of COVID-19     Today is hospital day 12d. This morning he is resting comfortably in bed and reports no new issues or overnight events.     PAST MEDICAL & SURGICAL HISTORY  Bradycardia  Hypertension  Artificial pacemaker    SOCIAL HISTORY:  Negative for smoking/alcohol/drug use.     ALLERGIES:  No Known Allergies    MEDICATIONS:  STANDING MEDICATIONS  ATENolol  Tablet 25 milliGRAM(s) Oral daily  dexAMETHasone  Injectable 6 milliGRAM(s) IV Push daily  dextrose 5%. 1000 milliLiter(s) IV Continuous <Continuous>  dextrose 50% Injectable 12.5 Gram(s) IV Push once  dextrose 50% Injectable 25 Gram(s) IV Push once  dextrose 50% Injectable 25 Gram(s) IV Push once  enoxaparin Injectable 70 milliGRAM(s) SubCutaneous every 12 hours  guaifenesin/dextromethorphan  Syrup 5 milliLiter(s) Oral every 6 hours  insulin lispro (HumaLOG) corrective regimen sliding scale   SubCutaneous three times a day before meals  pantoprazole    Tablet 40 milliGRAM(s) Oral before breakfast    PRN MEDICATIONS  dextrose 40% Gel 15 Gram(s) Oral once PRN  glucagon  Injectable 1 milliGRAM(s) IntraMuscular once PRN    VITALS:   T(F): 96.4  HR: 68  BP: 99/60  RR: 20  SpO2: 95%    LABS:                        15.5   18.37 )-----------( 192      ( 16 Aug 2020 06:55 )             47.5     08-16    134<L>  |  102  |  24<H>  ----------------------------<  143<H>  5.1<H>   |  21  |  1.0    Ca    8.5      16 Aug 2020 06:55  Mg     2.3     08-16    TPro  5.7<L>  /  Alb  3.3<L>  /  TBili  0.8  /  DBili  x   /  AST  21  /  ALT  25  /  AlkPhos  67  08-16              Culture - Blood (collected 13 Aug 2020 18:52)  Source: .Blood Blood  Preliminary Report (15 Aug 2020 02:02):    No growth to date.          RADIOLOGY:  < from: Xray Chest 1 View- PORTABLE-Routine (08.16.20 @ 06:51) >  EXAM:  XR CHEST PORTABLE ROUTINE 1V            PROCEDURE DATE:  08/16/2020            INTERPRETATION:  Clinical History / Reason for exam: COVID    Comparison : Chest radiograph August 15, 2020.    Technique/Positioning: Adequate.    Findings:    Support devices: Left-sided permanent pacemaker.    Cardiac/mediastinum/hilum: Stable    Lung parenchyma/Pleura: Bilateral opacities, unchanged. No pneumothorax is seen.    Skeleton/soft tissues: Stable    Impression:    Bilateral opacities, unchanged.    Left-sided permanent pacemaker.      < end of copied text >      PHYSICAL EXAM:  GEN: No acute distress  LUNGS: Clear to auscultation bilaterally   HEART: Regular  ABD: Soft, non-tender, non-distended.  EXT: NC/NC/NE/2+PP/MARC/Skin Intact.   NEURO: AAOX3    Intravenous access:   NG tube:   Saleem Catheter:

## 2020-08-16 NOTE — PROGRESS NOTE ADULT - SUBJECTIVE AND OBJECTIVE BOX
Patient denies SOB chest pain    80y old Male who presents with COVID LRTI (15 Aug 2020 14:50)  hospital day 12d. This morning he is resting comfortably in bed and reports no new issues or overnight events.         SAQT 96% HR 68    WBC 18.3 hgb/hct 15.5/43.7 plat 192K  sodium 137 potassium 5.1 Chloride 102 bicarb 21 Bun 24 creatinine 1.0 glucose 143 ca 8.5 total protein 5.7 alb 3.3 Mg 2.3  Bilateral opacities unchanged left sided pacemaker    PAST MEDICAL & SURGICAL HISTORY  Bradycardia  Hypertension  Artificial pacemaker    MEDICATIONS:  STANDING MEDICATIONS  ATENolol  Tablet 25 milliGRAM(s) Oral daily  dexAMETHasone  Injectable 6 milliGRAM(s) IV Push daily  dextrose 5%. 1000 milliLiter(s) IV Continuous <Continuous>  dextrose 50% Injectable 12.5 Gram(s) IV Push once  dextrose 50% Injectable 25 Gram(s) IV Push once  dextrose 50% Injectable 25 Gram(s) IV Push once  enoxaparin Injectable 70 milliGRAM(s) SubCutaneous every 12 hours  guaifenesin/dextromethorphan  Syrup 5 milliLiter(s) Oral every 6 hours  insulin lispro (HumaLOG) corrective regimen sliding scale   SubCutaneous three times a day before meals  pantoprazole    Tablet 40 milliGRAM(s) Oral before breakfast    PRN MEDICATIONS  dextrose 40% Gel 15 Gram(s) Oral once PRN  glucagon  Injectable 1 milliGRAM(s) IntraMuscular once PRN      08-16            Culture - Blood (collected 13 Aug 2020 18:52)  Source: .Blood Blood  Preliminary Report (15 Aug 2020 02:02):    No growth to date.      81 y/o M HTN, DM, DLD, CAD? , conduction abnormality s/p dual chamber pacemaker, hx of DVT on eliquis patient, presented with AHRF 2ry to COVID pna, hospital course complicated by acute dvt       1.AHRF 2/2 COVID  2.s/p 8/5 2 units convalescent plasma, s/p toci  3.HFNC 60L/70% today  4.S/p Tocilizumab 400mg 8/5  5.S/p Remdesivir 200mg x1   6.s/p Remdesivir 100mg qd x 10 days (Last day 8/14)  7.s/p Dexamethasone 6mg IV qd x10 days (Last day 8/16)  8.#Hx of DVt was on Eliquis  with Acute DVT while on AC SP GFF 8/5    PLAN  1.Monitor liver enzymes, daily CMP cutoff >205  2.lovenox 40 q12  3.Blood cultures NGTD  4.s/p meropenem   5.c/w therapeutic Lovenox for now  6. DLD/ DM 2/ HTN  7.Continue atenolol  8. Dual chamber pacemaker Patient denies SOB chest pain    history of Bradycardia Hypertension    80 year old male patient Known hypertensive, dyslipidemic, type II diabetic, CAD?,   conduction abnormality sp Dual chamber pacemaker.  transferred from [facility] for COVID-19 LRTI.  presents with COVID LRTI (15 Aug 2020 14:50)  Today is hospital day 12d. This morning he is resting comfortably in bed and reports no new issues or overnight events.         SAQT 96% HR 68    WBC 18.3 hgb/hct 15.5/43.7 plat 192K  sodium 137 potassium 5.1 Chloride 102 bicarb 21 Bun 24 creatinine 1.0 glucose 143 ca 8.5 total protein 5.7 alb 3.3 Mg 2.3  Bilateral opacities unchanged left sided pacemaker    PAST MEDICAL & SURGICAL HISTORY  Bradycardia  Hypertension  Artificial pacemaker    MEDICATIONS:  STANDING MEDICATIONS  ATENolol  Tablet 25 milliGRAM(s) Oral daily  dexAMETHasone  Injectable 6 milliGRAM(s) IV Push daily  dextrose 5%. 1000 milliLiter(s) IV Continuous <Continuous>  dextrose 50% Injectable 12.5 Gram(s) IV Push once  dextrose 50% Injectable 25 Gram(s) IV Push once  dextrose 50% Injectable 25 Gram(s) IV Push once  enoxaparin Injectable 70 milliGRAM(s) SubCutaneous every 12 hours  guaifenesin/dextromethorphan  Syrup 5 milliLiter(s) Oral every 6 hours  insulin lispro (HumaLOG) corrective regimen sliding scale   SubCutaneous three times a day before meals  pantoprazole    Tablet 40 milliGRAM(s) Oral before breakfast    PRN MEDICATIONS  dextrose 40% Gel 15 Gram(s) Oral once PRN  glucagon  Injectable 1 milliGRAM(s) IntraMuscular once PRN      08-16            Culture - Blood (collected 13 Aug 2020 18:52)  Source: .Blood Blood  Preliminary Report (15 Aug 2020 02:02):    No growth to date.      79 y/o M HTN, DM, DLD, CAD? , conduction abnormality s/p dual chamber pacemaker, hx of DVT on eliquis patient, presented with AHRF 2ry to [facility], hospital course complicated by acute dvt       1.AHRF 2/2 COVID  2.s/p 8/5 2 units convalescent plasma, s/p toci  3.HFNC 60L/70% today  4.S/p Tocilizumab 400mg 8/5  5.S/p Remdesivir 200mg x1   6.s/p Remdesivir 100mg qd x 10 days (Last day 8/14)  7.s/p Dexamethasone 6mg IV qd x10 days (Last day 8/16)  8.#Hx of DVt was on Eliquis  with Acute DVT while on AC SP GFF 8/5    PLAN  1.Monitor liver enzymes, daily CMP cutoff >205  2.lovenox 40 q12  3.Blood cultures NGTD  4.s/p meropenem   5.c/w therapeutic Lovenox for now  6. DLD/ DM 2/ HTN  7.Continue atenolol  8. Dual chamber pacemaker Patient denies SOB chest pain    history of Bradycardia Hypertension    80 year old male patient Known hypertensive, dyslipidemic, type II diabetic, CAD?,   conduction abnormality sp Dual chamber pacemaker.  transferred from [facility] for COVID-19 LRTI.  presents with COVID LRTI (15 Aug 2020 14:50)  Today is hospital day 12d. This morning he is resting comfortably in bed and reports no new issues or overnight events.         SAQT 96% HR 68    WBC 18.3 hgb/hct 15.5/43.7 plat 192K  sodium 137 potassium 5.1 Chloride 102 bicarb 21 Bun 24 creatinine 1.0 glucose 143 ca 8.5 total protein 5.7 alb 3.3 Mg 2.3  Bilateral opacities unchanged left sided pacemaker    PAST MEDICAL & SURGICAL HISTORY  Bradycardia  Hypertension  Artificial pacemaker    MEDICATIONS:  STANDING MEDICATIONS  ATENolol  Tablet 25 milliGRAM(s) Oral daily  dexAMETHasone  Injectable 6 milliGRAM(s) IV Push daily  dextrose 5%. 1000 milliLiter(s) IV Continuous <Continuous>  dextrose 50% Injectable 12.5 Gram(s) IV Push once  dextrose 50% Injectable 25 Gram(s) IV Push once  dextrose 50% Injectable 25 Gram(s) IV Push once  enoxaparin Injectable 70 milliGRAM(s) SubCutaneous every 12 hours  guaifenesin/dextromethorphan  Syrup 5 milliLiter(s) Oral every 6 hours  insulin lispro (HumaLOG) corrective regimen sliding scale   SubCutaneous three times a day before meals  pantoprazole    Tablet 40 milliGRAM(s) Oral before breakfast    PRN MEDICATIONS  dextrose 40% Gel 15 Gram(s) Oral once PRN  glucagon  Injectable 1 milliGRAM(s) IntraMuscular once PRN      08-16            Culture - Blood (collected 13 Aug 2020 18:52)  Source: .Blood Blood  Preliminary Report (15 Aug 2020 02:02):    No growth to date.      79 y/o M HTN, DM, DLD, CAD? , conduction abnormality s/p dual chamber pacemaker, hx of DVT on eliquis patient, presented with AHRF 2ry to [facility], hospital course complicated by acute dvt       1.Acute hyoxemic respiratory failure2/2 COVID 19 pneumonia  2.s/p 8/5 2 units convalescent plasma, s/p toci  3.HFNC 60L/70% today  4.S/p Tocilizumab 400mg 8/5  5.S/p Remdesivir 200mg x1   6.s/p Remdesivir 100mg qd x 10 days (Last day 8/14)  7.s/p Dexamethasone 6mg IV qd x10 days (Last day 8/16)  8.#Hx of DVt was on Eliquis  with Acute DVT while on AC SP GFF 8/5  9.Sepsis present on admission   10.HO DVT was on Eliquis.   11.Acute DVT while on AC SP GFF      PLAN  1.Monitor liver enzymes, daily CMP cutoff >205  2.lovenox 40 q12  3.Blood cultures NGTD  4.s/p meropenem   5.c/w therapeutic Lovenox for now  6. DLD/ DM 2/ HTN  7.Continue atenolol  8. Dual chamber pacemaker  9.avois CNS depression

## 2020-08-17 NOTE — PROGRESS NOTE ADULT - SUBJECTIVE AND OBJECTIVE BOX
SUBJECTIVE:    Patient is a 80y old Male who presents with a chief complaint of COVID LRTI (16 Aug 2020 14:04)    Currently admitted to medicine with the primary diagnosis of COVID-19     Today is hospital day 13d. This morning he is resting comfortably in bed and reports no new issues or overnight events.     INTERVAL EVENTS:     PAST MEDICAL & SURGICAL HISTORY  Bradycardia  Hypertension  Artificial pacemaker      ALLERGIES:  No Known Allergies    MEDICATIONS:  STANDING MEDICATIONS  ATENolol  Tablet 25 milliGRAM(s) Oral daily  dextrose 5%. 1000 milliLiter(s) IV Continuous <Continuous>  dextrose 50% Injectable 12.5 Gram(s) IV Push once  dextrose 50% Injectable 25 Gram(s) IV Push once  dextrose 50% Injectable 25 Gram(s) IV Push once  enoxaparin Injectable 70 milliGRAM(s) SubCutaneous every 12 hours  guaifenesin/dextromethorphan  Syrup 5 milliLiter(s) Oral every 6 hours  insulin lispro (HumaLOG) corrective regimen sliding scale   SubCutaneous three times a day before meals  pantoprazole    Tablet 40 milliGRAM(s) Oral before breakfast    PRN MEDICATIONS  dextrose 40% Gel 15 Gram(s) Oral once PRN  glucagon  Injectable 1 milliGRAM(s) IntraMuscular once PRN    VITALS:   T(F): 96.4  HR: 63  BP: 82/51  RR: 20  SpO2: 97%    LABS:                        14.0   17.91 )-----------( 219      ( 17 Aug 2020 07:40 )             42.6     08-17    135  |  102  |  21<H>  ----------------------------<  133<H>  4.5   |  27  |  0.9    Ca    8.5      17 Aug 2020 07:40  Phos  2.6     08-17  Mg     2.2     08-17    TPro  5.5<L>  /  Alb  3.1<L>  /  TBili  1.1  /  DBili  x   /  AST  15  /  ALT  23  /  AlkPhos  57  08-17                  RADIOLOGY:    PHYSICAL EXAM:  GEN: No acute distress  PULM/CHEST: Clear to auscultation bilaterally, no rales, rhonchi or wheezes   CVS: Regular rate and rhythm, S1-S2, no murmurs  ABD: Soft, non-tender, non-distended, +BS  EXT: No edema  NEURO: AAOx3    Saleem Catheter:

## 2020-08-17 NOTE — PROGRESS NOTE ADULT - SUBJECTIVE AND OBJECTIVE BOX
JOURDAN, ISMET  80y, Male    All available historical data reviewed    OVERNIGHT EVENTS:  no fevers  97% nc high flow 60    ROS:  General: Denies rigors, nightsweats  HEENT: Denies headache, rhinorrhea, sore throat, eye pain  CV: Denies CP, palpitations  PULM: Denies wheezing, hemoptysis  GI: Denies hematemesis, hematochezia, melena  : Denies discharge, hematuria  MSK: Denies arthralgias, myalgias  SKIN: Denies rash, lesions  NEURO: Denies paresthesias, weakness  PSYCH: Denies depression, anxiety    VITALS:  T(F): 96, Max: 96.4 (08-16-20 @ 23:41)  HR: 58  BP: 94/57  RR: 20Vital Signs Last 24 Hrs  T(C): 35.6 (17 Aug 2020 07:35), Max: 35.8 (16 Aug 2020 23:41)  T(F): 96 (17 Aug 2020 07:35), Max: 96.4 (16 Aug 2020 23:41)  HR: 58 (17 Aug 2020 07:35) (58 - 67)  BP: 94/57 (17 Aug 2020 07:35) (82/51 - 111/57)  BP(mean): 72 (17 Aug 2020 07:35) (62 - 78)  RR: 20 (17 Aug 2020 07:35) (20 - 20)  SpO2: 97% (17 Aug 2020 07:26) (97% - 98%)    TESTS & MEASUREMENTS:                        14.0   17.91 )-----------( 219      ( 17 Aug 2020 07:40 )             42.6     08-17    135  |  102  |  21<H>  ----------------------------<  133<H>  4.5   |  27  |  0.9    Ca    8.5      17 Aug 2020 07:40  Phos  2.6     08-17  Mg     2.2     08-17    TPro  5.5<L>  /  Alb  3.1<L>  /  TBili  1.1  /  DBili  x   /  AST  15  /  ALT  23  /  AlkPhos  57  08-17    LIVER FUNCTIONS - ( 17 Aug 2020 07:40 )  Alb: 3.1 g/dL / Pro: 5.5 g/dL / ALK PHOS: 57 U/L / ALT: 23 U/L / AST: 15 U/L / GGT: x             Culture - Urine (collected 08-14-20 @ 05:20)  Source: .Urine Clean Catch (Midstream)  Final Report (08-16-20 @ 14:49):    10,000 - 49,000 CFU/mL Presumptive Candida albicans "Susceptibilities not    performed"    <10,000 CFU/ml Normal Urogenital trell present    Culture - Blood (collected 08-13-20 @ 18:52)  Source: .Blood Blood  Preliminary Report (08-15-20 @ 02:02):    No growth to date.            RADIOLOGY & ADDITIONAL TESTS:  Personal review of radiological diagnostics performed  Echo and EKG results noted when applicable.     MEDICATIONS:  ATENolol  Tablet 25 milliGRAM(s) Oral daily  dextrose 40% Gel 15 Gram(s) Oral once PRN  dextrose 5%. 1000 milliLiter(s) IV Continuous <Continuous>  dextrose 50% Injectable 12.5 Gram(s) IV Push once  dextrose 50% Injectable 25 Gram(s) IV Push once  dextrose 50% Injectable 25 Gram(s) IV Push once  enoxaparin Injectable 70 milliGRAM(s) SubCutaneous every 12 hours  glucagon  Injectable 1 milliGRAM(s) IntraMuscular once PRN  guaifenesin/dextromethorphan  Syrup 5 milliLiter(s) Oral every 6 hours  insulin lispro (HumaLOG) corrective regimen sliding scale   SubCutaneous three times a day before meals  pantoprazole    Tablet 40 milliGRAM(s) Oral before breakfast  polyethylene glycol 3350 17 Gram(s) Oral daily      ANTIBIOTICS:

## 2020-08-17 NOTE — PROGRESS NOTE ADULT - ASSESSMENT
· Assessment		  80y M HTN, HLD, CAD?, conduction abnormality sp Dual chamber pacemaker admitted with COVID, tested + as an outpatient about 1 week prior.       IMPRESSION  # COVID-19 PNA with sepsis on admission    Rule out PE     LE Duplex: + DVTs s/p IVC filter 8/6    Procalcitonin, Serum: 0.16 (08-04-20 @ 21:00)- not consistent with bacterial infection    CXR bilateral airspace opacities     WBC 17.9  #Cytokine Release Syndrome   D-Dimer Assay, Quantitative: 3614 ng/mL DDU (08-06-20 @ 08:13)  #Transaminitis   #Lactic acidosis    RECOMMENDATIONS  - afebrile, CXR improving, Procalcitonin, Serum: 0.04 (08-13-20 @ 08:16)- not consistent with bacterial infection  - s/p Remdesivir 100mg daily x 10 days   - Decadron 6mg daily IV x 10 days  - s/p 8/5 Tocilizumab 400mg x1  - s/p 8/5 2 units convalescent plasma  - Therapeutic A/C  - Recommend prone positioning if possible     Please call with any questions or send a message on [employer] Teams  Spectra [phone]

## 2020-08-17 NOTE — PROGRESS NOTE ADULT - ASSESSMENT
79 y/o M HTN, DM, DLD, CAD? , conduction abnormality s/p dual chamber pacemaker, hx of DVT on eliquis patient, presented with AHRF 2ry to [facility], hospital course complicated by acute dvt     #AHRF 2ry to COVID  -On admission: CRP 19.62, D-dimer 63200, procal 0.16, ferritin 1570,   -Repeat 8/10-8/11 CRP 0.93 D-dimer 1132 procal 0.06 ferritin 1625,   -CXR 8/4: Patchy bilateral airspace opacities  -CXR 8/16 unchanged  -CXR 8/17  -s/p 8/5 2 units convalescent plasma, s/p toci  -HFNC 60L/70% today  -S/p Tocilizumab 400mg 8/5  -S/p Remdesivir 200mg x1   -s/p Remdesivir 100mg qd x 10 days (Last day 8/14)  -s/p Dexamethasone 6mg IV qd x10 days (Last day 8/16)  -Monitor liver enzymes, daily CMP cutoff >205  -ID following  -Pulm recs noted  -c/w lovenox 40 q12  -Blood cultures NGTD  -s/p meropenem     #Hx of DVt was on Eliquis  with Acute DVT while on AC SP GFF 8/5  c/w therapeutic Lovenox for now    # DLD/ DM 2/ HTN  - Continue atenolol    # Dual chamber pacemaker  - Reason for implant uncertain  - ECG sinus rhythm, not requiring pace backup at time of admission    # Diet > DASH/TLC  # DVT  On Lovenox therapeutic 79 y/o M HTN, DM, DLD, CAD? , conduction abnormality s/p dual chamber pacemaker, hx of DVT on eliquis patient, presented with AHRF 2ry to [facility], hospital course complicated by acute dvt     #AHRF 2ry to COVID  -On admission: CRP 19.62, D-dimer 46753, procal 0.16, ferritin 1570,   -Repeat 8/10-8/11 CRP 0.93 D-dimer 1132 procal 0.06 ferritin 1625,   -CXR 8/4: Patchy bilateral airspace opacities  -CXR 8/16 unchanged  -CXR 8/17  -s/p 8/5 2 units convalescent plasma, s/p toci  -HFNC 60L/70% today  -S/p Tocilizumab 400mg 8/5  -S/p Remdesivir 200mg x1   -s/p Remdesivir 100mg qd x 10 days (Last day 8/14)  -s/p Dexamethasone 6mg IV qd x10 days (Last day 8/16)  -Monitor liver enzymes, daily CMP cutoff >205  -ID following  -Pulm recs noted  -c/w lovenox 40 q12  -Blood cultures NGTD  -s/p meropenem     #Hx of DVt was on Eliquis  with Acute DVT while on AC SP GFF 8/5  c/w therapeutic Lovenox for now    # DLD/ DM 2/ HTN  - Continue atenolol    #Constipation  -Started on Miralax 17 gm    # Dual chamber pacemaker  - Reason for implant uncertain  - ECG sinus rhythm, not requiring pace backup at time of admission    # Diet > DASH/TLC  # DVT  On Lovenox therapeutic

## 2020-08-17 NOTE — PROGRESS NOTE ADULT - SUBJECTIVE AND OBJECTIVE BOX
Patient is a 80y old  Male who presents with a chief complaint of COVID LRTI (17 Aug 2020 09:36)        Over Night Events:  Resting in bed.  Off pressors          ROS:     All ROS are negative except HPI         PHYSICAL EXAM    ICU Vital Signs Last 24 Hrs  T(C): 36.2 (17 Aug 2020 16:07), Max: 36.2 (17 Aug 2020 16:07)  T(F): 97.2 (17 Aug 2020 16:07), Max: 97.2 (17 Aug 2020 16:07)  HR: 65 (17 Aug 2020 16:07) (58 - 65)  BP: 101/58 (17 Aug 2020 16:07) (82/51 - 101/58)  BP(mean): 75 (17 Aug 2020 16:07) (62 - 75)  ABP: --  ABP(mean): --  RR: 20 (17 Aug 2020 16:07) (20 - 20)  SpO2: 98% (17 Aug 2020 15:30) (96% - 98%)      CONSTITUTIONAL:  Well nourished.  NAD    ENT:   Airway patent,   Mouth with normal mucosa.   No thrush    EYES:   Pupils equal,   Round and reactive to light.    CARDIAC:   Normal rate,   Regular rhythm.    No edema      Vascular:  Normal systolic impulse  No Carotid bruits    RESPIRATORY:   Bilateral crackles   Normal chest expansion  Not tachypneic,  No use of accessory muscles    GASTROINTESTINAL:  Abdomen soft,   Non-tender,   No guarding,   + BS    MUSCULOSKELETAL:   Range of motion is not limited,  No clubbing, cyanosis    NEUROLOGICAL:   Alert and oriented   No motor  deficits.    SKIN:   Skin normal color for race,   Warm and dry and intact.   No evidence of rash.    PSYCHIATRIC:   Normal mood and affect.   No apparent risk to self or others.    HEMATOLOGICAL:  No cervical  lymphadenopathy.  no inguinal lymphadenopathy      08-16-20 @ 07:01  -  08-17-20 @ 07:00  --------------------------------------------------------  IN:  Total IN: 0 mL    OUT:    Estimated Blood Loss: 400 mL    Voided: 500 mL  Total OUT: 900 mL    Total NET: -900 mL      08-17-20 @ 07:01  -  08-17-20 @ 20:42  --------------------------------------------------------  IN:  Total IN: 0 mL    OUT:    Voided: 850 mL  Total OUT: 850 mL    Total NET: -850 mL          LABS:                            14.0   17.91 )-----------( 219      ( 17 Aug 2020 07:40 )             42.6                                               08-17    135  |  102  |  21<H>  ----------------------------<  133<H>  4.5   |  27  |  0.9    Ca    8.5      17 Aug 2020 07:40  Phos  2.6     08-17  Mg     2.2     08-17    TPro  5.5<L>  /  Alb  3.1<L>  /  TBili  1.1  /  DBili  x   /  AST  15  /  ALT  23  /  AlkPhos  57  08-17                                                                                           LIVER FUNCTIONS - ( 17 Aug 2020 07:40 )  Alb: 3.1 g/dL / Pro: 5.5 g/dL / ALK PHOS: 57 U/L / ALT: 23 U/L / AST: 15 U/L / GGT: x                                                                                                                                       MEDICATIONS  (STANDING):  ATENolol  Tablet 25 milliGRAM(s) Oral daily  dextrose 5%. 1000 milliLiter(s) (50 mL/Hr) IV Continuous <Continuous>  dextrose 50% Injectable 12.5 Gram(s) IV Push once  dextrose 50% Injectable 25 Gram(s) IV Push once  dextrose 50% Injectable 25 Gram(s) IV Push once  enoxaparin Injectable 70 milliGRAM(s) SubCutaneous every 12 hours  guaifenesin/dextromethorphan  Syrup 5 milliLiter(s) Oral every 6 hours  insulin lispro (HumaLOG) corrective regimen sliding scale   SubCutaneous three times a day before meals  pantoprazole    Tablet 40 milliGRAM(s) Oral before breakfast  polyethylene glycol 3350 17 Gram(s) Oral daily    MEDICATIONS  (PRN):  dextrose 40% Gel 15 Gram(s) Oral once PRN Blood Glucose LESS THAN 70 milliGRAM(s)/deciliter  glucagon  Injectable 1 milliGRAM(s) IntraMuscular once PRN Glucose LESS THAN 70 milligrams/deciliter      New X-rays reviewed:                                                                                  ECHO    CXR interpreted by me:

## 2020-08-17 NOTE — PROGRESS NOTE ADULT - ASSESSMENT
IMPRESSION:    Acute Hypoxemic Respiratory Failure   Sepsis present on admission   COVID-19 PNA  HO DVT was on Eliquis.   Acute DVT while on AC SP GFF    PLAN:    CNS: Avoid CNS depressants.      HEENT: Oral care    PULMONARY: keep sat >88%.   HOB @ 45 degrees. Aspiration Precautions. Wean O2 as tolerated     CARDIOVASCULAR: Avoid volume overload .     GI: GI prophylaxis.  Feeding as tolerated      RENAL:  Follow up lytes.  Correct as needed.    INFECTIOUS DISEASE:  per ID,      HEMATOLOGICAL:  therapeutic Lovenox q12,      ENDOCRINE:  Follow up FS.  Insulin protocol if needed.    MUSCULOSKELETAL: Bedrest    Prognosis guarded     Continue monitoring in step down unit

## 2020-08-18 NOTE — CHART NOTE - NSCHARTNOTEFT_GEN_A_CORE
Registered Dietitian Limited Follow-Up:    Acute hypoxic respiratory failure 2/2 to COVID per progress notes. Constipation reported - on bowel regimen; last BM 8/17. No N/V/D reported at this time. Pt currently receives a DASH/TLC diet. Reportedly maintains adequate appetite & po intake at this time, consuming 75% of most meals. Labs/medications reviewed. Latest glucose 137 (8/18 6:05). POCT glucose 132 (11:41) vs. 139 (6:34) 8/5 [id number]%. Latest wt 73.4 kg (8/17). Previous wt 74.3 kg (8/9), 69.7 kg (8/5), 74.8 kg (8/4).  No significant wt changes observed at this time. Skin noted intact. No chewing/swallowing difficulty reported. Information obtain from EMR & RN. Unable to speak with pt d/t contact precautions r/t COVID. Nutrition hx data unavailable at this time as a result. Attempted to call contact number in chart, but no response at this time. Will attempt to obtain nutrition hx at follow-up. Nutrition Intervention remains to maintain Meals & Snacks. Recommendation: Continue DASH/TLC diet + add Consistent Carbohydrate diet modifier. No active nutrition diagnosis at this time. Will remain not at nutrition risk. Will review in 7 days. Review with CHARLY [email or web address].

## 2020-08-18 NOTE — PROGRESS NOTE ADULT - ASSESSMENT
· Assessment		  80y M HTN, HLD, CAD?, conduction abnormality sp Dual chamber pacemaker admitted with COVID, tested + as an outpatient about 1 week prior.       IMPRESSION  # COVID-19 PNA with sepsis on admission    LE Duplex: + DVTs s/p IVC filter 8/6    Procalcitonin, Serum: 0.16 (08-04-20 @ 21:00)- not consistent with bacterial infection    CXR bilateral airspace opacities     WBC 17.9>11.8  #Cytokine Release Syndrome   D-Dimer Assay, Quantitative: 3614 ng/mL DDU (08-06-20 @ 08:13)  #Transaminitis   #Lactic acidosis    RECOMMENDATIONS  - afebrile, CXR improving, Procalcitonin, Serum: 0.04 (08-13-20 @ 08:16)- not consistent with bacterial infection  - s/p Remdesivir 100mg daily x 10 days   - Decadron 6mg daily IV x 10 days  - s/p 8/5 Tocilizumab 400mg x1  - s/p 8/5 2 units convalescent plasma  - Therapeutic A/C    Please call with any questions or send a message on [employer] Teams  Spectra [phone]

## 2020-08-18 NOTE — PROGRESS NOTE ADULT - ASSESSMENT
81 y/o M HTN, DM, DLD, CAD? , conduction abnormality s/p dual chamber pacemaker, hx of DVT on eliquis patient, presented with AHRF 2ry to [facility], hospital course complicated by acute dvt     #Acute hypoxic respiratory failure 2/2 to COVID  -On admission: CRP 19.62, D-dimer 25055, procal 0.16, ferritin 1570,   -Repeat 8/10-8/11 CRP 0.93 D-dimer 1132 procal 0.06 ferritin 1625,   -CXR 8/4: Patchy bilateral airspace opacities  -CXR 8/16 unchanged  -CXR 8/18: Low lung volume  No significant interval change. Stable, patchy bilateral lower lung opacities.  -s/p 8/5 2 units convalescent plasma, s/p toci  -S/p Tocilizumab 400mg 8/5  -S/p Remdesivir 200mg x1   -s/p Remdesivir 100mg qd x 10 days (Last day 8/14)  -s/p Dexamethasone 6mg IV qd x10 days (Last day 8/16)  -Monitor liver enzymes, daily CMP cutoff >205  -ID following  -Pulm recs noted  -c/w lovenox 40 q12  -Blood cultures NGTD  -HFNC 60L/70% today; try to wean off o2  -s/p meropenem     #Hx of DVt was on Eliquis  with Acute DVT while on AC SP GFF 8/5  c/w therapeutic Lovenox for now    # DLD/ DM 2/ HTN  - Continue atenolol    #Constipation  -Started on Miralax 17 gm    # Dual chamber pacemaker  - Reason for implant uncertain  - ECG sinus rhythm, not requiring pace backup at time of admission    # Diet > DASH/TLC  # DVT  On Lovenox therapeutic

## 2020-08-18 NOTE — PROGRESS NOTE ADULT - SUBJECTIVE AND OBJECTIVE BOX
JOURDAN, ISMET  80y, Male    All available historical data reviewed    OVERNIGHT EVENTS:  no fever  feels well and has no complaints   97%hfnc 60    ROS:  General: Denies rigors, nightsweats  HEENT: Denies headache, rhinorrhea, sore throat, eye pain  CV: Denies CP, palpitations  PULM: Denies wheezing, hemoptysis  GI: Denies hematemesis, hematochezia, melena  : Denies discharge, hematuria  MSK: Denies arthralgias, myalgias  SKIN: Denies rash, lesions  NEURO: Denies paresthesias, weakness  PSYCH: Denies depression, anxiety    VITALS:  T(F): 97.5, Max: 97.5 (08-18-20 @ 08:25)  HR: 61  BP: 84/55  RR: 20Vital Signs Last 24 Hrs  T(C): 36.4 (18 Aug 2020 08:25), Max: 36.4 (18 Aug 2020 08:25)  T(F): 97.5 (18 Aug 2020 08:25), Max: 97.5 (18 Aug 2020 08:25)  HR: 61 (18 Aug 2020 08:25) (60 - 67)  BP: 84/55 (18 Aug 2020 08:25) (84/55 - 101/58)  BP(mean): 63 (18 Aug 2020 08:25) (63 - 75)  RR: 20 (18 Aug 2020 08:25) (18 - 20)  SpO2: 97% (18 Aug 2020 07:30) (95% - 100%)    TESTS & MEASUREMENTS:                        14.3   11.84 )-----------( 218      ( 18 Aug 2020 06:05 )             43.1     08-18    134<L>  |  101  |  18  ----------------------------<  137<H>  4.3   |  29  |  0.9    Ca    8.2<L>      18 Aug 2020 06:05  Phos  2.6     08-17  Mg     2.2     08-18    TPro  5.3<L>  /  Alb  3.1<L>  /  TBili  0.9  /  DBili  x   /  AST  14  /  ALT  21  /  AlkPhos  62  08-18    LIVER FUNCTIONS - ( 18 Aug 2020 06:05 )  Alb: 3.1 g/dL / Pro: 5.3 g/dL / ALK PHOS: 62 U/L / ALT: 21 U/L / AST: 14 U/L / GGT: x             Culture - Urine (collected 08-14-20 @ 05:20)  Source: .Urine Clean Catch (Midstream)  Final Report (08-16-20 @ 14:49):    10,000 - 49,000 CFU/mL Presumptive Candida albicans "Susceptibilities not    performed"    <10,000 CFU/ml Normal Urogenital trell present    Culture - Blood (collected 08-13-20 @ 18:52)  Source: .Blood Blood  Preliminary Report (08-15-20 @ 02:02):    No growth to date.            RADIOLOGY & ADDITIONAL TESTS:  Personal review of radiological diagnostics performed  Echo and EKG results noted when applicable.     MEDICATIONS:  ATENolol  Tablet 25 milliGRAM(s) Oral daily  dextrose 40% Gel 15 Gram(s) Oral once PRN  dextrose 5%. 1000 milliLiter(s) IV Continuous <Continuous>  dextrose 50% Injectable 12.5 Gram(s) IV Push once  dextrose 50% Injectable 25 Gram(s) IV Push once  dextrose 50% Injectable 25 Gram(s) IV Push once  enoxaparin Injectable 70 milliGRAM(s) SubCutaneous every 12 hours  glucagon  Injectable 1 milliGRAM(s) IntraMuscular once PRN  guaifenesin/dextromethorphan  Syrup 5 milliLiter(s) Oral every 6 hours  insulin lispro (HumaLOG) corrective regimen sliding scale   SubCutaneous three times a day before meals  pantoprazole    Tablet 40 milliGRAM(s) Oral before breakfast  polyethylene glycol 3350 17 Gram(s) Oral daily      ANTIBIOTICS:

## 2020-08-18 NOTE — PROGRESS NOTE ADULT - ASSESSMENT
IMPRESSION:    Acute Hypoxemic Respiratory Failure   Sepsis present on admission   COVID-19 PNA  HO DVT was on Eliquis.   Acute DVT while on AC SP GFF    PLAN:    CNS: Avoid CNS depressants.      HEENT: Oral care    PULMONARY: keep sat >88%.   HOB @ 45 degrees. Aspiration Precautions. Wean O2 as tolerated     CARDIOVASCULAR: Avoid volume overload .     GI: GI prophylaxis.  Feeding as tolerated      RENAL:  Follow up lytes.  Correct as needed.    INFECTIOUS DISEASE:  per ID,      HEMATOLOGICAL:  therapeutic Lovenox q12,      ENDOCRINE:  Follow up FS.  Insulin protocol if needed.    MUSCULOSKELETAL: Bedrest    Continue monitoring in step down unit Discontinue Regimen: Doxycycline Detail Level: Detailed

## 2020-08-18 NOTE — PROGRESS NOTE ADULT - SUBJECTIVE AND OBJECTIVE BOX
SUBJECTIVE:    Patient is a 80y old Male who presents with a chief complaint of COVID LRTI (18 Aug 2020 12:54)    Currently admitted to medicine with the primary diagnosis of COVID-19     Today is hospital day 14d. This morning he is resting comfortably in bed and reports no new issues or overnight events.         PAST MEDICAL & SURGICAL HISTORY  Bradycardia  Hypertension  Artificial pacemaker      ALLERGIES:  No Known Allergies    MEDICATIONS:  STANDING MEDICATIONS  ATENolol  Tablet 25 milliGRAM(s) Oral daily  dextrose 5%. 1000 milliLiter(s) IV Continuous <Continuous>  dextrose 50% Injectable 12.5 Gram(s) IV Push once  dextrose 50% Injectable 25 Gram(s) IV Push once  dextrose 50% Injectable 25 Gram(s) IV Push once  enoxaparin Injectable 70 milliGRAM(s) SubCutaneous every 12 hours  guaifenesin/dextromethorphan  Syrup 5 milliLiter(s) Oral every 6 hours  insulin lispro (HumaLOG) corrective regimen sliding scale   SubCutaneous three times a day before meals  pantoprazole    Tablet 40 milliGRAM(s) Oral before breakfast  polyethylene glycol 3350 17 Gram(s) Oral daily    PRN MEDICATIONS  dextrose 40% Gel 15 Gram(s) Oral once PRN  glucagon  Injectable 1 milliGRAM(s) IntraMuscular once PRN    VITALS:   T(F): 97.5  HR: 61  BP: 84/55  RR: 20  SpO2: 97%    LABS:                        14.3   11.84 )-----------( 218      ( 18 Aug 2020 06:05 )             43.1     08-18    134<L>  |  101  |  18  ----------------------------<  137<H>  4.3   |  29  |  0.9    Ca    8.2<L>      18 Aug 2020 06:05  Phos  2.6     08-17  Mg     2.2     08-18    TPro  5.3<L>  /  Alb  3.1<L>  /  TBili  0.9  /  DBili  x   /  AST  14  /  ALT  21  /  AlkPhos  62  08-18                  RADIOLOGY:    PHYSICAL EXAM:  GEN: No acute distress  PULM/CHEST: Clear to auscultation bilaterally, no rales, rhonchi or wheezes   CVS: Regular rate and rhythm, S1-S2, no murmurs  ABD: Soft, non-tender, non-distended, +BS  EXT: No edema  NEURO: AAOx3    Saleem Catheter:

## 2020-08-18 NOTE — PROGRESS NOTE ADULT - SUBJECTIVE AND OBJECTIVE BOX
Patient is a 80y old  Male who presents with a chief complaint of COVID LRTI (18 Aug 2020 16:04)        Over Night Events:  Feels better.  Remains on HFNCO2         ROS:     All ROS are negative except HPI         PHYSICAL EXAM    ICU Vital Signs Last 24 Hrs  T(C): 36 (18 Aug 2020 17:06), Max: 36.4 (18 Aug 2020 08:25)  T(F): 96.8 (18 Aug 2020 17:06), Max: 97.5 (18 Aug 2020 08:25)  HR: 64 (18 Aug 2020 17:06) (60 - 67)  BP: 105/57 (18 Aug 2020 17:06) (84/55 - 105/57)  BP(mean): 63 (18 Aug 2020 08:25) (63 - 67)  ABP: --  ABP(mean): --  RR: 20 (18 Aug 2020 17:06) (18 - 20)  SpO2: 96% (18 Aug 2020 16:17) (95% - 100%)      CONSTITUTIONAL:  Well nourished.  NAD    ENT:   Airway patent,   Mouth with normal mucosa.   No thrush    EYES:   Pupils equal,   Round and reactive to light.    CARDIAC:   Normal rate,   Regular rhythm.    No edema      Vascular:  Normal systolic impulse  No Carotid bruits    RESPIRATORY:   No wheezing  Bilateral crackles   Normal chest expansion  Not tachypneic,  No use of accessory muscles    GASTROINTESTINAL:  Abdomen soft,   Non-tender,   No guarding,   + BS    MUSCULOSKELETAL:   Range of motion is not limited,  No clubbing, cyanosis    NEUROLOGICAL:   Alert and oriented   No motor  deficits.    SKIN:   Skin normal color for race,   Warm and dry and intact.   No evidence of rash.    PSYCHIATRIC:   Normal mood and affect.   No apparent risk to self or others.    HEMATOLOGICAL:  No cervical  lymphadenopathy.  no inguinal lymphadenopathy      08-17-20 @ 07:01  -  08-18-20 @ 07:00  --------------------------------------------------------  IN:  Total IN: 0 mL    OUT:    Voided: 1750 mL  Total OUT: 1750 mL    Total NET: -1750 mL      08-18-20 @ 07:01  -  08-18-20 @ 18:45  --------------------------------------------------------  IN:  Total IN: 0 mL    OUT:    Voided: 500 mL  Total OUT: 500 mL    Total NET: -500 mL          LABS:                            14.3   11.84 )-----------( 218      ( 18 Aug 2020 06:05 )             43.1                                               08-18    134<L>  |  101  |  18  ----------------------------<  137<H>  4.3   |  29  |  0.9    Ca    8.2<L>      18 Aug 2020 06:05  Phos  2.6     08-17  Mg     2.2     08-18    TPro  5.3<L>  /  Alb  3.1<L>  /  TBili  0.9  /  DBili  x   /  AST  14  /  ALT  21  /  AlkPhos  62  08-18                                                                                           LIVER FUNCTIONS - ( 18 Aug 2020 06:05 )  Alb: 3.1 g/dL / Pro: 5.3 g/dL / ALK PHOS: 62 U/L / ALT: 21 U/L / AST: 14 U/L / GGT: x                                                                                                                                       MEDICATIONS  (STANDING):  ATENolol  Tablet 25 milliGRAM(s) Oral daily  dextrose 5%. 1000 milliLiter(s) (50 mL/Hr) IV Continuous <Continuous>  dextrose 50% Injectable 12.5 Gram(s) IV Push once  dextrose 50% Injectable 25 Gram(s) IV Push once  dextrose 50% Injectable 25 Gram(s) IV Push once  enoxaparin Injectable 70 milliGRAM(s) SubCutaneous every 12 hours  guaifenesin/dextromethorphan  Syrup 5 milliLiter(s) Oral every 6 hours  insulin lispro (HumaLOG) corrective regimen sliding scale   SubCutaneous three times a day before meals  pantoprazole    Tablet 40 milliGRAM(s) Oral before breakfast  polyethylene glycol 3350 17 Gram(s) Oral daily    MEDICATIONS  (PRN):  dextrose 40% Gel 15 Gram(s) Oral once PRN Blood Glucose LESS THAN 70 milliGRAM(s)/deciliter  glucagon  Injectable 1 milliGRAM(s) IntraMuscular once PRN Glucose LESS THAN 70 milligrams/deciliter      New X-rays reviewed:                                                                                  ECHO    CXR interpreted by me:

## 2020-08-19 NOTE — PROGRESS NOTE ADULT - ASSESSMENT
79 y/o M HTN, DM, DLD, CAD? , conduction abnormality s/p dual chamber pacemaker, hx of DVT on eliquis patient, presented with AHRF 2ry to [facility], hospital course complicated by acute dvt     #Acute hypoxic respiratory failure 2/2 to COVID  -On admission: CRP 19.62, D-dimer 03811, procal 0.16, ferritin 1570,   -Repeat 8/10-8/11 CRP 0.93 D-dimer 1132 procal 0.06 ferritin 1625,   -CXR 8/4: Patchy bilateral airspace opacities  -CXR 8/16 unchanged  -CXR 8/18: Low lung volume  No significant interval change. Stable, patchy bilateral lower lung opacities.  -s/p 8/5 2 units convalescent plasma, s/p toci  -S/p Tocilizumab 400mg 8/5  -S/p Remdesivir 200mg x1   -s/p Remdesivir 100mg qd x 10 days (Last day 8/14)  -s/p Dexamethasone 6mg IV qd x10 days (Last day 8/16)  -Monitor liver enzymes, daily CMP cutoff >205  -ID following  -Pulm recs noted  -c/w lovenox 40 q12  -Blood cultures NGTD  -HFNC 60L/70% today; try to wean off o2  -s/p meropenem     #Hx of DVt was on Eliquis  with Acute DVT while on AC SP GFF 8/5  c/w therapeutic Lovenox for now    # DLD/ DM 2/ HTN  - Continue atenolol    #Constipation  -Started on Miralax 17 gm    # Dual chamber pacemaker  - Reason for implant uncertain  - ECG sinus rhythm, not requiring pace backup at time of admission    # Diet > DASH/TLC  # DVT  On Lovenox therapeutic 81 y/o M HTN, DM, DLD, CAD? , conduction abnormality s/p dual chamber pacemaker, hx of DVT on eliquis patient, presented with AHRF 2ry to [facility], hospital course complicated by acute dvt     #Acute hypoxic respiratory failure 2/2 to COVID  -On admission: CRP 19.62, D-dimer 65441, procal 0.16, ferritin 1570,   -Repeat 8/10-8/11 CRP 0.93 D-dimer 1132 procal 0.06 ferritin 1625,   -CXR 8/4: Patchy bilateral airspace opacities  -CXR 8/16 unchanged  -CXR 8/18: Low lung volume  No significant interval change. Stable, patchy bilateral lower lung opacities.  -s/p 8/5 2 units convalescent plasma, s/p toci  -S/p Tocilizumab 400mg 8/5  -S/p Remdesivir 200mg x1   -s/p Remdesivir 100mg qd x 10 days (Last day 8/14)  -s/p Dexamethasone 6mg IV qd x10 days (Last day 8/16)  -Monitor liver enzymes, daily CMP cutoff >205  -ID following  -Pulm recs noted  -c/w lovenox 40 q12  -Blood cultures NGTD  -HFNC 60L/70% today; try to wean off o2  - Solumedrol 60 mg q 12 hrs  -s/p meropenem     #Hx of DVt was on Eliquis  with Acute DVT while on AC SP GFF 8/5  c/w therapeutic Lovenox for now    # DLD/ DM 2/ HTN  - Continue atenolol    #Constipation  -Started on Miralax 17 gm    # Dual chamber pacemaker  - Reason for implant uncertain  - ECG sinus rhythm, not requiring pace backup at time of admission    # Diet > DASH/TLC  # DVT  On Lovenox therapeutic 81 y/o M HTN, DM, DLD, CAD? , conduction abnormality s/p dual chamber pacemaker, hx of DVT on eliquis patient, presented with AHRF 2ry to [facility], hospital course complicated by acute dvt     #Acute hypoxic respiratory failure 2/2 to COVID  -On admission: CRP 19.62, D-dimer 87659, procal 0.16, ferritin 1570,   -Repeat 8/10-8/11 CRP 0.93 D-dimer 1132 procal 0.06 ferritin 1625,   -CXR 8/4: Patchy bilateral airspace opacities  -CXR 8/16 unchanged  -CXR 8/18: Low lung volume  No significant interval change. Stable, patchy bilateral lower lung opacities.  -s/p 8/5 2 units convalescent plasma, s/p toci  -S/p Tocilizumab 400mg 8/5  -S/p Remdesivir 200mg x1   -s/p Remdesivir 100mg qd x 10 days (Last day 8/14)  -s/p Dexamethasone 6mg IV qd x10 days (Last day 8/16)  -Monitor liver enzymes, daily CMP cutoff >205  -ID following  -Pulm recs noted  -c/w lovenox 40 q12  -Blood cultures NGTD  -HFNC 60L/70% today; try to wean off o2  - Solumedrol 60 mg q 12 hrs  -s/p meropenem   -f/u inflammatory markers    #Hx of DVt was on Eliquis  with Acute DVT while on AC SP GFF 8/5  c/w therapeutic Lovenox for now    # DLD/ DM 2/ HTN  - Continue atenolol    #Constipation  -Started on Miralax 17 gm    # Dual chamber pacemaker  - Reason for implant uncertain  - ECG sinus rhythm, not requiring pace backup at time of admission    # Diet > DASH/TLC  # DVT  On Lovenox therapeutic

## 2020-08-19 NOTE — PROGRESS NOTE ADULT - SUBJECTIVE AND OBJECTIVE BOX
SUBJECTIVE:    Patient is a 80y old Male who presents with a chief complaint of COVID LRTI (18 Aug 2020 18:45)    Currently admitted to medicine with the primary diagnosis of COVID-19     Today is hospital day 15d. This morning he is resting comfortably in bed and reports no new issues or overnight events.        PAST MEDICAL & SURGICAL HISTORY  Bradycardia  Hypertension  Artificial pacemaker      ALLERGIES:  No Known Allergies    MEDICATIONS:  STANDING MEDICATIONS  ATENolol  Tablet 25 milliGRAM(s) Oral daily  dextrose 5%. 1000 milliLiter(s) IV Continuous <Continuous>  dextrose 50% Injectable 12.5 Gram(s) IV Push once  dextrose 50% Injectable 25 Gram(s) IV Push once  dextrose 50% Injectable 25 Gram(s) IV Push once  enoxaparin Injectable 70 milliGRAM(s) SubCutaneous every 12 hours  guaifenesin/dextromethorphan  Syrup 5 milliLiter(s) Oral every 6 hours  insulin lispro (HumaLOG) corrective regimen sliding scale   SubCutaneous three times a day before meals  pantoprazole    Tablet 40 milliGRAM(s) Oral before breakfast  polyethylene glycol 3350 17 Gram(s) Oral daily    PRN MEDICATIONS  ALBUTerol    90 MICROgram(s) HFA Inhaler 2 Puff(s) Inhalation every 6 hours PRN  dextrose 40% Gel 15 Gram(s) Oral once PRN  glucagon  Injectable 1 milliGRAM(s) IntraMuscular once PRN    VITALS:   T(F): 96.2  HR: 70  BP: 96/53  RR: 20  SpO2: 98%    LABS:                        14.1   10.84 )-----------( 191      ( 19 Aug 2020 05:57 )             43.2     08-19    134<L>  |  101  |  15  ----------------------------<  143<H>  4.6   |  25  |  0.9    Ca    8.4<L>      19 Aug 2020 05:57  Mg     2.3     08-19    TPro  5.3<L>  /  Alb  3.1<L>  /  TBili  1.0  /  DBili  x   /  AST  14  /  ALT  16  /  AlkPhos  63  08-19                  RADIOLOGY:    PHYSICAL EXAM:  GEN: No acute distress  PULM/CHEST: Clear to auscultation bilaterally, no rales, rhonchi or wheezes   CVS: Regular rate and rhythm, S1-S2, no murmurs  ABD: Soft, non-tender, non-distended, +BS  EXT: No edema  NEURO: AAOx3    Saleem Catheter: SUBJECTIVE:    Patient is a 80y old Male who presents with a chief complaint of COVID LRTI (18 Aug 2020 18:45)    Currently admitted to medicine with the primary diagnosis of COVID-19     Today is hospital day 15d. This morning he is resting comfortably in bed and reports no new issues or overnight events.   On high flow nasal canula saturating at 98%       PAST MEDICAL & SURGICAL HISTORY  Bradycardia  Hypertension  Artificial pacemaker      ALLERGIES:  No Known Allergies    MEDICATIONS:  STANDING MEDICATIONS  ATENolol  Tablet 25 milliGRAM(s) Oral daily  dextrose 5%. 1000 milliLiter(s) IV Continuous <Continuous>  dextrose 50% Injectable 12.5 Gram(s) IV Push once  dextrose 50% Injectable 25 Gram(s) IV Push once  dextrose 50% Injectable 25 Gram(s) IV Push once  enoxaparin Injectable 70 milliGRAM(s) SubCutaneous every 12 hours  guaifenesin/dextromethorphan  Syrup 5 milliLiter(s) Oral every 6 hours  insulin lispro (HumaLOG) corrective regimen sliding scale   SubCutaneous three times a day before meals  pantoprazole    Tablet 40 milliGRAM(s) Oral before breakfast  polyethylene glycol 3350 17 Gram(s) Oral daily    PRN MEDICATIONS  ALBUTerol    90 MICROgram(s) HFA Inhaler 2 Puff(s) Inhalation every 6 hours PRN  dextrose 40% Gel 15 Gram(s) Oral once PRN  glucagon  Injectable 1 milliGRAM(s) IntraMuscular once PRN    VITALS:   T(F): 96.2  HR: 70  BP: 96/53  RR: 20  SpO2: 98%    LABS:                        14.1   10.84 )-----------( 191      ( 19 Aug 2020 05:57 )             43.2     08-19    134<L>  |  101  |  15  ----------------------------<  143<H>  4.6   |  25  |  0.9    Ca    8.4<L>      19 Aug 2020 05:57  Mg     2.3     08-19    TPro  5.3<L>  /  Alb  3.1<L>  /  TBili  1.0  /  DBili  x   /  AST  14  /  ALT  16  /  AlkPhos  63  08-19                  RADIOLOGY:    PHYSICAL EXAM:  GEN: No acute distress  PULM/CHEST: Clear to auscultation bilaterally, no rales, rhonchi or wheezes   CVS: Regular rate and rhythm, S1-S2, no murmurs  ABD: Soft, non-tender, non-distended, +BS  EXT: No edema  NEURO: AAOx3    Saleem Catheter:

## 2020-08-19 NOTE — PROGRESS NOTE ADULT - SUBJECTIVE AND OBJECTIVE BOX
Patient is a 80y old  Male who presents with a chief complaint of COVID LRTI (19 Aug 2020 10:28)        Over Night Events:  Feels better.  NO SOB At rest         ROS:     All ROS are negative except HPI         PHYSICAL EXAM    ICU Vital Signs Last 24 Hrs  T(C): 36.6 (19 Aug 2020 16:13), Max: 36.6 (19 Aug 2020 16:13)  T(F): 97.8 (19 Aug 2020 16:13), Max: 97.8 (19 Aug 2020 16:13)  HR: 70 (19 Aug 2020 16:13) (63 - 72)  BP: 108/61 (19 Aug 2020 16:13) (96/53 - 108/61)  BP(mean): 78 (19 Aug 2020 16:13) (66 - 78)  ABP: --  ABP(mean): --  RR: 20 (19 Aug 2020 16:13) (20 - 20)  SpO2: 97% (19 Aug 2020 16:12) (94% - 98%)      CONSTITUTIONAL:  Well nourished.  NAD    ENT:   Airway patent,   Mouth with normal mucosa.   No thrush    EYES:   Pupils equal,   Round and reactive to light.    CARDIAC:   Normal rate,   Regular rhythm.    No edema      Vascular:  Normal systolic impulse  No Carotid bruits    RESPIRATORY:   No wheezing  Bilateral crackles   Normal chest expansion  Not tachypneic,  No use of accessory muscles    GASTROINTESTINAL:  Abdomen soft,   Non-tender,   No guarding,   + BS    MUSCULOSKELETAL:   Range of motion is not limited,  No clubbing, cyanosis    NEUROLOGICAL:   Alert and oriented   No motor  deficits.    SKIN:   Skin normal color for race,   Warm and dry    No evidence of rash.    PSYCHIATRIC:   No apparent risk to self or others.    HEMATOLOGICAL:  No cervical  lymphadenopathy.  no inguinal lymphadenopathy      08-18-20 @ 07:01  -  08-19-20 @ 07:00  --------------------------------------------------------  IN:  Total IN: 0 mL    OUT:    Voided: 1500 mL  Total OUT: 1500 mL    Total NET: -1500 mL      08-19-20 @ 07:01  -  08-19-20 @ 19:34  --------------------------------------------------------  IN:  Total IN: 0 mL    OUT:    Voided: 600 mL  Total OUT: 600 mL    Total NET: -600 mL          LABS:                            14.1   10.84 )-----------( 191      ( 19 Aug 2020 05:57 )             43.2                                               08-19    134<L>  |  101  |  15  ----------------------------<  143<H>  4.6   |  25  |  0.9    Ca    8.4<L>      19 Aug 2020 05:57  Mg     2.3     08-19    TPro  5.3<L>  /  Alb  3.1<L>  /  TBili  1.0  /  DBili  x   /  AST  14  /  ALT  16  /  AlkPhos  63  08-19                                                                                           LIVER FUNCTIONS - ( 19 Aug 2020 05:57 )  Alb: 3.1 g/dL / Pro: 5.3 g/dL / ALK PHOS: 63 U/L / ALT: 16 U/L / AST: 14 U/L / GGT: x                                                                                                                                       MEDICATIONS  (STANDING):  ATENolol  Tablet 25 milliGRAM(s) Oral daily  dextrose 5%. 1000 milliLiter(s) (50 mL/Hr) IV Continuous <Continuous>  dextrose 50% Injectable 12.5 Gram(s) IV Push once  dextrose 50% Injectable 25 Gram(s) IV Push once  dextrose 50% Injectable 25 Gram(s) IV Push once  enoxaparin Injectable 70 milliGRAM(s) SubCutaneous every 12 hours  guaifenesin/dextromethorphan  Syrup 5 milliLiter(s) Oral every 6 hours  insulin lispro (HumaLOG) corrective regimen sliding scale   SubCutaneous three times a day before meals  methylPREDNISolone sodium succinate Injectable 60 milliGRAM(s) IV Push every 12 hours  pantoprazole    Tablet 40 milliGRAM(s) Oral before breakfast  polyethylene glycol 3350 17 Gram(s) Oral daily    MEDICATIONS  (PRN):  ALBUTerol    90 MICROgram(s) HFA Inhaler 2 Puff(s) Inhalation every 6 hours PRN Shortness of Breath and/or Wheezing  dextrose 40% Gel 15 Gram(s) Oral once PRN Blood Glucose LESS THAN 70 milliGRAM(s)/deciliter  glucagon  Injectable 1 milliGRAM(s) IntraMuscular once PRN Glucose LESS THAN 70 milligrams/deciliter      New X-rays reviewed:                                                                                  ECHO    CXR interpreted by me:  Bibasilar infiltrates

## 2020-08-19 NOTE — PROGRESS NOTE ADULT - ASSESSMENT
IMPRESSION:    Acute Hypoxemic Respiratory Failure   Sepsis present on admission   COVID-19 PNA  HO DVT was on Eliquis.   Acute DVT while on AC SP GFF    PLAN:    CNS: Avoid CNS depressants.      HEENT: Oral care    PULMONARY: keep sat >88%.   HOB @ 45 degrees. Aspiration Precautions. Wean O2 as tolerated     CARDIOVASCULAR: Avoid volume overload .     GI: GI prophylaxis.  Feeding as tolerated      RENAL:  Follow up lytes.  Correct as needed.    INFECTIOUS DISEASE:  per ID,  FU inflammatory markers     HEMATOLOGICAL:  therapeutic Lovenox q12,      ENDOCRINE:  Follow up FS.  Insulin protocol if needed.    MUSCULOSKELETAL: Bedrest    Continue monitoring in step down unit

## 2020-08-20 NOTE — PROGRESS NOTE ADULT - ASSESSMENT
81 y/o M HTN, DM, DLD, CAD? , conduction abnormality s/p dual chamber pacemaker, hx of DVT on eliquis patient, presented with AHRF 2ry to [facility], hospital course complicated by acute dvt     #Acute hypoxic respiratory failure 2/2 to COVID  -On admission: CRP 19.62, D-dimer 01695, procal 0.16, ferritin 1570,   -Repeat 8/10-8/11 CRP 0.93 D-dimer 1132 procal 0.06 ferritin 1625,   -CXR 8/4: Patchy bilateral airspace opacities  -CXR 8/16 unchanged  -CXR 8/18: Low lung volume  No significant interval change. Stable, patchy bilateral lower lung opacities.  -s/p 8/5 2 units convalescent plasma, s/p toci  -S/p Tocilizumab 400mg 8/5  -S/p Remdesivir 200mg x1   -s/p Remdesivir 100mg qd x 10 days (Last day 8/14)  -s/p Dexamethasone 6mg IV qd x10 days (Last day 8/16)  -Monitor liver enzymes, daily CMP cutoff >205  -ID following  -Pulm recs noted  -c/w lovenox 40 q12  -Blood cultures NGTD  -HFNC 60L/70% today; try to wean off o2  - Solumedrol 60 mg q 12 hrs  -s/p meropenem   -procalc 08/13 and 08/14= .03        #Hx of DVt was on Eliquis  with Acute DVT while on AC SP GFF 8/5  c/w therapeutic Lovenox for now    # DLD/ DM 2/ HTN  - Continue atenolol    #Constipation  -Started on Miralax 17 gm    # Dual chamber pacemaker  - Reason for implant uncertain  - ECG sinus rhythm, not requiring pace backup at time of admission    # Diet > DASH/TLC  # DVT  On Lovenox therapeutic 81 y/o M HTN, DM, DLD, CAD? , conduction abnormality s/p dual chamber pacemaker, hx of DVT on eliquis patient, presented with AHRF 2ry to [facility], hospital course complicated by acute dvt     #Acute hypoxic respiratory failure 2/2 to COVID  -On admission: CRP 19.62, D-dimer 33809, procal 0.16, ferritin 1570,   -Repeat 8/10-8/11 CRP 0.93 D-dimer 1132 procal 0.06 ferritin 1625,   -CXR 8/4: Patchy bilateral airspace opacities  -CXR 8/16 unchanged  -CXR 8/18: Low lung volume  No significant interval change. Stable, patchy bilateral lower lung opacities.  -s/p 8/5 2 units convalescent plasma, s/p toci  -S/p Tocilizumab 400mg 8/5  -S/p Remdesivir 200mg x1   -s/p Remdesivir 100mg qd x 10 days (Last day 8/14)  -s/p Dexamethasone 6mg IV qd x10 days (Last day 8/16)  -Monitor liver enzymes, daily CMP cutoff >205  -ID following  -Pulm recs noted  -c/w lovenox 40 q12  -Blood cultures NGTD  -HFNC 60L/70% today; try to wean off o2  -s/p meropenem   -procalc 08/13 and 08/14= .03  - Solumedrol 60 mg q 12 hrs  - Started patient on bactrim for PCP PPX      #Hx of DVt was on Eliquis  with Acute DVT while on AC SP GFF 8/5  c/w therapeutic Lovenox for now    # DLD/ DM 2/ HTN  - Continue atenolol    #Constipation  -Started on Miralax 17 gm    # Dual chamber pacemaker  - Reason for implant uncertain  - ECG sinus rhythm, not requiring pace backup at time of admission    # Diet > DASH/TLC  # DVT  On Lovenox therapeutic 81 y/o M HTN, DM, DLD, CAD? , conduction abnormality s/p dual chamber pacemaker, hx of DVT on eliquis patient, presented with AHRF 2ry to [facility], hospital course complicated by acute dvt     #Acute hypoxic respiratory failure 2/2 to COVID  -On admission: CRP 19.62, D-dimer 58898, procal 0.16, ferritin 1570,   -Repeat 8/10-8/11 CRP 0.93 D-dimer 1132 procal 0.06 ferritin 1625,   -CXR 8/4: Patchy bilateral airspace opacities  -CXR 8/16 unchanged  -CXR 8/18: Low lung volume  No significant interval change. Stable, patchy bilateral lower lung opacities.  -s/p 8/5 2 units convalescent plasma, s/p toci  -S/p Tocilizumab 400mg 8/5  -S/p Remdesivir 200mg x1   -s/p Remdesivir 100mg qd x 10 days (Last day 8/14)  -s/p Dexamethasone 6mg IV qd x10 days (Last day 8/16)  -Monitor liver enzymes, daily CMP cutoff >205  -ID following  -Pulm recs noted  -c/w lovenox 40 q12  -Blood cultures NGTD  -s/p meropenem   -procalc 08/13 and 08/14= .03  - Solumedrol 60 mg q 12 hrs  - Started patient on bactrim for PCP PPX  - Current flow rate 45; on 55% saturating 94%; attempt to wean off o2      #Hx of DVt was on Eliquis  with Acute DVT while on AC SP GFF 8/5  c/w therapeutic Lovenox for now    # DLD/ DM 2/ HTN  - Continue atenolol    #Constipation  -Started on Miralax 17 gm    # Dual chamber pacemaker  - Reason for implant uncertain  - ECG sinus rhythm, not requiring pace backup at time of admission    # Diet > DASH/TLC  # DVT  On Lovenox therapeutic

## 2020-08-20 NOTE — PROGRESS NOTE ADULT - SUBJECTIVE AND OBJECTIVE BOX
Patient is a 80y old  Male who presents with a chief complaint of COVID LRTI (20 Aug 2020 09:43)        Over Night Events:  Continue to slowly improve         ROS:     All ROS are negative except HPI         PHYSICAL EXAM    ICU Vital Signs Last 24 Hrs  T(C): 35.7 (20 Aug 2020 15:30), Max: 36.5 (19 Aug 2020 23:32)  T(F): 96.2 (20 Aug 2020 15:30), Max: 97.7 (19 Aug 2020 23:32)  HR: 87 (20 Aug 2020 15:30) (60 - 87)  BP: 102/55 (20 Aug 2020 15:30) (100/59 - 107/61)  BP(mean): 73 (20 Aug 2020 15:30) (73 - 79)  ABP: --  ABP(mean): --  RR: 20 (20 Aug 2020 15:30) (20 - 20)  SpO2: 98% (20 Aug 2020 16:25) (94% - 98%)      CONSTITUTIONAL:  Well nourished.  NAD    ENT:   Airway patent,   Mouth with normal mucosa.   No thrush    EYES:   Pupils equal,   Round and reactive to light.    CARDIAC:   Normal rate,   Regular rhythm.    No edema      Vascular:  Normal systolic impulse  No Carotid bruits    RESPIRATORY:   No wheezing  Bilateral BS  Normal chest expansion  Not tachypneic,  No use of accessory muscles    GASTROINTESTINAL:  Abdomen soft,   Non-tender,   No guarding,   + BS    MUSCULOSKELETAL:   Range of motion is not limited,  No clubbing, cyanosis    NEUROLOGICAL:   Alert and oriented   No motor  deficits.    SKIN:   Skin normal color for race,   Warm and dry and intact.   No evidence of rash.    PSYCHIATRIC:   Normal mood and affect.   No apparent risk to self or others.    HEMATOLOGICAL:  No cervical  lymphadenopathy.  no inguinal lymphadenopathy      08-19-20 @ 07:01  -  08-20-20 @ 07:00  --------------------------------------------------------  IN:  Total IN: 0 mL    OUT:    Voided: 900 mL  Total OUT: 900 mL    Total NET: -900 mL          LABS:                            13.9   11.90 )-----------( 195      ( 20 Aug 2020 08:00 )             42.8                                               08-20    134<L>  |  99  |  14  ----------------------------<  232<H>  5.0   |  23  |  0.9    Ca    8.6      20 Aug 2020 08:00  Mg     2.3     08-20    TPro  5.6<L>  /  Alb  3.4<L>  /  TBili  0.6  /  DBili  x   /  AST  14  /  ALT  19  /  AlkPhos  67  08-20                                                                                           LIVER FUNCTIONS - ( 20 Aug 2020 08:00 )  Alb: 3.4 g/dL / Pro: 5.6 g/dL / ALK PHOS: 67 U/L / ALT: 19 U/L / AST: 14 U/L / GGT: x                                                                                                                                       MEDICATIONS  (STANDING):  ATENolol  Tablet 25 milliGRAM(s) Oral daily  dextrose 5%. 1000 milliLiter(s) (50 mL/Hr) IV Continuous <Continuous>  dextrose 50% Injectable 12.5 Gram(s) IV Push once  dextrose 50% Injectable 25 Gram(s) IV Push once  dextrose 50% Injectable 25 Gram(s) IV Push once  enoxaparin Injectable 70 milliGRAM(s) SubCutaneous every 12 hours  guaifenesin/dextromethorphan  Syrup 5 milliLiter(s) Oral every 6 hours  insulin lispro (HumaLOG) corrective regimen sliding scale   SubCutaneous three times a day before meals  pantoprazole    Tablet 40 milliGRAM(s) Oral before breakfast  polyethylene glycol 3350 17 Gram(s) Oral daily    MEDICATIONS  (PRN):  ALBUTerol    90 MICROgram(s) HFA Inhaler 2 Puff(s) Inhalation every 6 hours PRN Shortness of Breath and/or Wheezing  dextrose 40% Gel 15 Gram(s) Oral once PRN Blood Glucose LESS THAN 70 milliGRAM(s)/deciliter  glucagon  Injectable 1 milliGRAM(s) IntraMuscular once PRN Glucose LESS THAN 70 milligrams/deciliter      New X-rays reviewed:                                                                                  ECHO    CXR interpreted by me:

## 2020-08-20 NOTE — PROGRESS NOTE ADULT - SUBJECTIVE AND OBJECTIVE BOX
SUBJECTIVE:    Patient is a 80y old Male who presents with a chief complaint of COVID LRTI (19 Aug 2020 19:34)    Currently admitted to medicine with the primary diagnosis of COVID-19     Today is hospital day 16d. This morning he is resting comfortably in bed and reports no new issues or overnight events.       PAST MEDICAL & SURGICAL HISTORY  Bradycardia  Hypertension  Artificial pacemaker      ALLERGIES:  No Known Allergies    MEDICATIONS:  STANDING MEDICATIONS  ATENolol  Tablet 25 milliGRAM(s) Oral daily  dextrose 5%. 1000 milliLiter(s) IV Continuous <Continuous>  dextrose 50% Injectable 12.5 Gram(s) IV Push once  dextrose 50% Injectable 25 Gram(s) IV Push once  dextrose 50% Injectable 25 Gram(s) IV Push once  enoxaparin Injectable 70 milliGRAM(s) SubCutaneous every 12 hours  guaifenesin/dextromethorphan  Syrup 5 milliLiter(s) Oral every 6 hours  insulin lispro (HumaLOG) corrective regimen sliding scale   SubCutaneous three times a day before meals  methylPREDNISolone sodium succinate Injectable 60 milliGRAM(s) IV Push every 12 hours  pantoprazole    Tablet 40 milliGRAM(s) Oral before breakfast  polyethylene glycol 3350 17 Gram(s) Oral daily    PRN MEDICATIONS  ALBUTerol    90 MICROgram(s) HFA Inhaler 2 Puff(s) Inhalation every 6 hours PRN  dextrose 40% Gel 15 Gram(s) Oral once PRN  glucagon  Injectable 1 milliGRAM(s) IntraMuscular once PRN    VITALS:   T(F): 96.5  HR: 60  BP: 107/61  RR: 20  SpO2: 97%    LABS:                        13.9   11.90 )-----------( 195      ( 20 Aug 2020 08:00 )             42.8     08-20    134<L>  |  99  |  14  ----------------------------<  232<H>  5.0   |  23  |  0.9    Ca    8.6      20 Aug 2020 08:00  Mg     2.3     08-20    TPro  5.6<L>  /  Alb  3.4<L>  /  TBili  0.6  /  DBili  x   /  AST  14  /  ALT  19  /  AlkPhos  67  08-20                  RADIOLOGY:    PHYSICAL EXAM:  GEN: No acute distress  PULM/CHEST: Clear to auscultation bilaterally, no rales, rhonchi or wheezes   CVS: Regular rate and rhythm, S1-S2, no murmurs  ABD: Soft, non-tender, non-distended, +BS  EXT: No edema  NEURO: AAOx3    Saleem Catheter: SUBJECTIVE:    Patient is a 80y old Male who presents with a chief complaint of COVID LRTI (19 Aug 2020 19:34)    Currently admitted to medicine with the primary diagnosis of COVID-19     Today is hospital day 16d. This morning he is resting comfortably in bed and reports no new issues or overnight events.     PAST MEDICAL & SURGICAL HISTORY  Bradycardia  Hypertension  Artificial pacemaker      ALLERGIES:  No Known Allergies    MEDICATIONS:  STANDING MEDICATIONS  ATENolol  Tablet 25 milliGRAM(s) Oral daily  dextrose 5%. 1000 milliLiter(s) IV Continuous <Continuous>  dextrose 50% Injectable 12.5 Gram(s) IV Push once  dextrose 50% Injectable 25 Gram(s) IV Push once  dextrose 50% Injectable 25 Gram(s) IV Push once  enoxaparin Injectable 70 milliGRAM(s) SubCutaneous every 12 hours  guaifenesin/dextromethorphan  Syrup 5 milliLiter(s) Oral every 6 hours  insulin lispro (HumaLOG) corrective regimen sliding scale   SubCutaneous three times a day before meals  methylPREDNISolone sodium succinate Injectable 60 milliGRAM(s) IV Push every 12 hours  pantoprazole    Tablet 40 milliGRAM(s) Oral before breakfast  polyethylene glycol 3350 17 Gram(s) Oral daily    PRN MEDICATIONS  ALBUTerol    90 MICROgram(s) HFA Inhaler 2 Puff(s) Inhalation every 6 hours PRN  dextrose 40% Gel 15 Gram(s) Oral once PRN  glucagon  Injectable 1 milliGRAM(s) IntraMuscular once PRN    VITALS:   T(F): 96.5  HR: 60  BP: 107/61  RR: 20  SpO2: 97%    LABS:                        13.9   11.90 )-----------( 195      ( 20 Aug 2020 08:00 )             42.8     08-20    134<L>  |  99  |  14  ----------------------------<  232<H>  5.0   |  23  |  0.9    Ca    8.6      20 Aug 2020 08:00  Mg     2.3     08-20    TPro  5.6<L>  /  Alb  3.4<L>  /  TBili  0.6  /  DBili  x   /  AST  14  /  ALT  19  /  AlkPhos  67  08-20                  RADIOLOGY:    PHYSICAL EXAM:  GEN: No acute distress  PULM/CHEST: Clear to auscultation bilaterally, no rales, rhonchi or wheezes   CVS: Regular rate and rhythm, S1-S2, no murmurs  ABD: Soft, non-tender, non-distended, +BS  EXT: No edema  NEURO: AAOx3    Saleem Catheter:

## 2020-08-21 NOTE — PROGRESS NOTE ADULT - SUBJECTIVE AND OBJECTIVE BOX
JOURDAN, ISMET  80y, Male    All available historical data reviewed    OVERNIGHT EVENTS:  no fevers  feels well and has no complaints   HFNC   nose bleed    ROS:  General: Denies rigors, nightsweats  HEENT: Denies headache, rhinorrhea, sore throat, eye pain  CV: Denies CP, palpitations  PULM: Denies wheezing, hemoptysis  GI: Denies hematemesis, hematochezia, melena  : Denies discharge, hematuria  MSK: Denies arthralgias, myalgias  SKIN: Denies rash, lesions  NEURO: Denies paresthesias, weakness  PSYCH: Denies depression, anxiety    VITALS:  T(F): 96.9, Max: 96.9 (08-21-20 @ 08:23)  HR: 62  BP: 92/51  RR: 18Vital Signs Last 24 Hrs  T(C): 36.1 (21 Aug 2020 08:23), Max: 36.1 (21 Aug 2020 08:23)  T(F): 96.9 (21 Aug 2020 08:23), Max: 96.9 (21 Aug 2020 08:23)  HR: 62 (21 Aug 2020 08:23) (62 - 87)  BP: 92/51 (21 Aug 2020 08:23) (92/51 - 110/71)  BP(mean): 67 (21 Aug 2020 08:23) (67 - 73)  RR: 18 (21 Aug 2020 08:23) (18 - 20)  SpO2: 100% (21 Aug 2020 07:53) (98% - 100%)    TESTS & MEASUREMENTS:                        11.6   20.99 )-----------( 187      ( 21 Aug 2020 06:09 )             34.2     08-21    129<L>  |  94<L>  |  18  ----------------------------<  154<H>  5.0   |  23  |  0.9    Ca    8.5      21 Aug 2020 06:09  Mg     2.2     08-21    TPro  5.5<L>  /  Alb  3.2<L>  /  TBili  0.9  /  DBili  x   /  AST  19  /  ALT  18  /  AlkPhos  60  08-21    LIVER FUNCTIONS - ( 21 Aug 2020 06:09 )  Alb: 3.2 g/dL / Pro: 5.5 g/dL / ALK PHOS: 60 U/L / ALT: 18 U/L / AST: 19 U/L / GGT: x                   RADIOLOGY & ADDITIONAL TESTS:  Personal review of radiological diagnostics performed  Echo and EKG results noted when applicable.     MEDICATIONS:  ALBUTerol    90 MICROgram(s) HFA Inhaler 2 Puff(s) Inhalation every 6 hours PRN  ATENolol  Tablet 25 milliGRAM(s) Oral daily  dextrose 40% Gel 15 Gram(s) Oral once PRN  dextrose 5%. 1000 milliLiter(s) IV Continuous <Continuous>  dextrose 50% Injectable 12.5 Gram(s) IV Push once  dextrose 50% Injectable 25 Gram(s) IV Push once  dextrose 50% Injectable 25 Gram(s) IV Push once  enoxaparin Injectable 70 milliGRAM(s) SubCutaneous every 12 hours  glucagon  Injectable 1 milliGRAM(s) IntraMuscular once PRN  guaifenesin/dextromethorphan  Syrup 5 milliLiter(s) Oral every 6 hours  HYDROmorphone  Injectable 1 milliGRAM(s) IV Push once  insulin lispro (HumaLOG) corrective regimen sliding scale   SubCutaneous three times a day before meals  pantoprazole    Tablet 40 milliGRAM(s) Oral before breakfast  polyethylene glycol 3350 17 Gram(s) Oral daily      ANTIBIOTICS:

## 2020-08-21 NOTE — PROGRESS NOTE ADULT - SUBJECTIVE AND OBJECTIVE BOX
SUBJECTIVE:    Patient is a 80y old Male who presents with a chief complaint of COVID LRTI (20 Aug 2020 19:57)    Currently admitted to medicine with the primary diagnosis of COVID-19     Today is hospital day 17d. This morning he is resting comfortably in bed and reports no new issues or overnight events.     Overnight patient had a nose bleed and was switched to nonrebreather; resolved hgb stable; currently back on HFNC 50/50    PAST MEDICAL & SURGICAL HISTORY  Bradycardia  Hypertension  Artificial pacemaker      ALLERGIES:  No Known Allergies    MEDICATIONS:  STANDING MEDICATIONS  acetaminophen   Tablet .. 650 milliGRAM(s) Oral once  ATENolol  Tablet 25 milliGRAM(s) Oral daily  dextrose 5%. 1000 milliLiter(s) IV Continuous <Continuous>  dextrose 50% Injectable 12.5 Gram(s) IV Push once  dextrose 50% Injectable 25 Gram(s) IV Push once  dextrose 50% Injectable 25 Gram(s) IV Push once  enoxaparin Injectable 70 milliGRAM(s) SubCutaneous every 12 hours  guaifenesin/dextromethorphan  Syrup 5 milliLiter(s) Oral every 6 hours  insulin lispro (HumaLOG) corrective regimen sliding scale   SubCutaneous three times a day before meals  pantoprazole    Tablet 40 milliGRAM(s) Oral before breakfast  polyethylene glycol 3350 17 Gram(s) Oral daily    PRN MEDICATIONS  ALBUTerol    90 MICROgram(s) HFA Inhaler 2 Puff(s) Inhalation every 6 hours PRN  dextrose 40% Gel 15 Gram(s) Oral once PRN  glucagon  Injectable 1 milliGRAM(s) IntraMuscular once PRN    VITALS:   T(F): 96.9  HR: 62  BP: 92/51  RR: 18  SpO2: 100%    LABS:                        11.6   20.99 )-----------( 187      ( 21 Aug 2020 06:09 )             34.2     08-21    129<L>  |  94<L>  |  18  ----------------------------<  154<H>  5.0   |  23  |  0.9    Ca    8.5      21 Aug 2020 06:09  Mg     2.2     08-21    TPro  5.5<L>  /  Alb  3.2<L>  /  TBili  0.9  /  DBili  x   /  AST  19  /  ALT  18  /  AlkPhos  60  08-21                  RADIOLOGY:    PHYSICAL EXAM:  GEN: No acute distress  PULM/CHEST: Clear to auscultation bilaterally, no rales, rhonchi or wheezes   CVS: Regular rate and rhythm, S1-S2, no murmurs  ABD: Soft, non-tender, non-distended, +BS  EXT: No edema  NEURO: AAOx3    Saleem Catheter:

## 2020-08-21 NOTE — PROGRESS NOTE ADULT - SUBJECTIVE AND OBJECTIVE BOX
Patient is a 80y old  Male who presents with a chief complaint of COVID LRTI (21 Aug 2020 13:55)        Over Night Events: ON 50% O2.  No SOB at rest.         ROS:     All ROS are negative except HPI         PHYSICAL EXAM    ICU Vital Signs Last 24 Hrs  T(C): 35.5 (21 Aug 2020 16:15), Max: 36.1 (21 Aug 2020 08:23)  T(F): 95.9 (21 Aug 2020 16:15), Max: 96.9 (21 Aug 2020 08:23)  HR: 78 (21 Aug 2020 16:15) (62 - 86)  BP: 98/48 (21 Aug 2020 16:15) (92/51 - 110/71)  BP(mean): 68 (21 Aug 2020 16:15) (67 - 68)  ABP: --  ABP(mean): --  RR: 18 (21 Aug 2020 16:15) (18 - 18)  SpO2: 99% (21 Aug 2020 14:49) (98% - 100%)      CONSTITUTIONAL:  Well nourished.  NAD    ENT:   Airway patent,   Mouth with normal mucosa.   No thrush    EYES:   Pupils equal,   Round and reactive to light.    CARDIAC:   Normal rate,   Regular rhythm.    No edema      Vascular:  Normal systolic impulse  No Carotid bruits    RESPIRATORY:   No wheezing  Bilateral BS  Normal chest expansion  Not tachypneic,  No use of accessory muscles    GASTROINTESTINAL:  Abdomen soft,   Non-tender,   No guarding,   + BS    MUSCULOSKELETAL:   Range of motion is not limited,  No clubbing, cyanosis    NEUROLOGICAL:   Alert and oriented   No motor  deficits.    SKIN:   Skin normal color for race,   Warm and dry and intact.   No evidence of rash.    PSYCHIATRIC:   Normal mood and affect.   No apparent risk to self or others.    HEMATOLOGICAL:  No cervical  lymphadenopathy.  no inguinal lymphadenopathy      08-20-20 @ 07:01  -  08-21-20 @ 07:00  --------------------------------------------------------  IN:  Total IN: 0 mL    OUT:    Voided: 500 mL  Total OUT: 500 mL    Total NET: -500 mL      08-21-20 @ 07:01  -  08-21-20 @ 16:19  --------------------------------------------------------  IN:  Total IN: 0 mL    OUT:    Voided: 600 mL  Total OUT: 600 mL    Total NET: -600 mL          LABS:                            11.6   20.99 )-----------( 187      ( 21 Aug 2020 06:09 )             34.2                                               08-21    129<L>  |  94<L>  |  18  ----------------------------<  154<H>  5.0   |  23  |  0.9    Ca    8.5      21 Aug 2020 06:09  Mg     2.2     08-21    TPro  5.5<L>  /  Alb  3.2<L>  /  TBili  0.9  /  DBili  x   /  AST  19  /  ALT  18  /  AlkPhos  60  08-21                                                                                           LIVER FUNCTIONS - ( 21 Aug 2020 06:09 )  Alb: 3.2 g/dL / Pro: 5.5 g/dL / ALK PHOS: 60 U/L / ALT: 18 U/L / AST: 19 U/L / GGT: x                                                                                                                                       MEDICATIONS  (STANDING):  ATENolol  Tablet 25 milliGRAM(s) Oral daily  dextrose 5%. 1000 milliLiter(s) (50 mL/Hr) IV Continuous <Continuous>  dextrose 50% Injectable 12.5 Gram(s) IV Push once  dextrose 50% Injectable 25 Gram(s) IV Push once  dextrose 50% Injectable 25 Gram(s) IV Push once  enoxaparin Injectable 70 milliGRAM(s) SubCutaneous every 12 hours  guaifenesin/dextromethorphan  Syrup 5 milliLiter(s) Oral every 6 hours  insulin lispro (HumaLOG) corrective regimen sliding scale   SubCutaneous three times a day before meals  pantoprazole    Tablet 40 milliGRAM(s) Oral before breakfast  polyethylene glycol 3350 17 Gram(s) Oral daily    MEDICATIONS  (PRN):  ALBUTerol    90 MICROgram(s) HFA Inhaler 2 Puff(s) Inhalation every 6 hours PRN Shortness of Breath and/or Wheezing  dextrose 40% Gel 15 Gram(s) Oral once PRN Blood Glucose LESS THAN 70 milliGRAM(s)/deciliter  glucagon  Injectable 1 milliGRAM(s) IntraMuscular once PRN Glucose LESS THAN 70 milligrams/deciliter      New X-rays reviewed:                                                                                  ECHO    CXR interpreted by me:

## 2020-08-21 NOTE — PROGRESS NOTE ADULT - ASSESSMENT
79 y/o M HTN, DM, DLD, CAD? , conduction abnormality s/p dual chamber pacemaker, hx of DVT on eliquis patient, presented with AHRF 2ry to [facility], hospital course complicated by acute dvt     #Acute hypoxic respiratory failure 2/2 to COVID  -On admission: CRP 19.62, D-dimer 02712, procal 0.16, ferritin 1570,   -Repeat 8/10-8/11 CRP 0.93 D-dimer 1132 procal 0.06 ferritin 1625,   -CXR 8/4: Patchy bilateral airspace opacities  -CXR 8/16 unchanged  -CXR 8/18: Low lung volume  No significant interval change. Stable, patchy bilateral lower lung opacities.  -s/p 8/5 2 units convalescent plasma, s/p toci  -S/p Tocilizumab 400mg 8/5  -S/p Remdesivir 200mg x1   -s/p Remdesivir 100mg qd x 10 days (Last day 8/14)  -s/p Dexamethasone 6mg IV qd x10 days (Last day 8/16)  -Monitor liver enzymes, daily CMP cutoff >205  -ID following  -Pulm recs noted  -c/w lovenox 40 q12  -Blood cultures NGTD  -s/p meropenem   -procalc 08/13 and 08/14= .03  - Solumedrol 60 mg q 12 hrs  - Started patient on bactrim for PCP PPX  - Current flow rate 50; on 50% saturating 94%; attempt to wean off o2    #Hx of DVt was on Eliquis with Acute DVT while on AC SP GFF 8/5  -c/w therapeutic Lovenox for now  - f/u with repeat duplex ultrasound    # DLD/ DM 2/ HTN  - Continue atenolol    #Constipation  -Started on Miralax 17 gm    # Dual chamber pacemaker  - Reason for implant uncertain  - ECG sinus rhythm, not requiring pace backup at time of admission    # Diet > DASH/TLC  # DVT  On Lovenox therapeutic 79 y/o M HTN, DM, DLD, CAD? , conduction abnormality s/p dual chamber pacemaker, hx of DVT on eliquis patient, presented with AHRF 2ry to [facility], hospital course complicated by acute dvt     #Acute hypoxic respiratory failure 2/2 to COVID  -On admission: CRP 19.62, D-dimer 12100, procal 0.16, ferritin 1570,   -Repeat 8/10-8/11 CRP 0.93 D-dimer 1132 procal 0.06 ferritin 1625,   -CXR 8/4: Patchy bilateral airspace opacities  -CXR 8/16 unchanged  -CXR 8/18: Low lung volume  No significant interval change. Stable, patchy bilateral lower lung opacities.  -s/p 8/5 2 units convalescent plasma, s/p toci  -S/p Tocilizumab 400mg 8/5  -S/p Remdesivir 200mg x1   -s/p Remdesivir 100mg qd x 10 days (Last day 8/14)  -s/p Dexamethasone 6mg IV qd x10 days (Last day 8/16)  -Monitor liver enzymes, daily CMP cutoff >205  -ID following  -Pulm recs noted  -c/w lovenox 40 q12  -Blood cultures NGTD  -s/p meropenem   -procalc 08/13 and 08/14= .03  - Solumedrol 60 mg q 12 hrs  - Started patient on bactrim for PCP PPX  - Current flow rate 50; on 50% saturating 94%; attempt to wean off o2    #Hx of DVt was on Eliquis with Acute DVT while on AC SP GFF 8/5  -c/w therapeutic Lovenox for now  - f/u with repeat duplex ultrasound  - < from: [facility] Duplex Lower Ext Vein Scan, Bilat (08.21.20 @ 11:31) >  Acute right common femoral and femoral vein DVT. The previously noted popliteal vein DVT is not visualized.  Acute left posterior tibial vein DVT. Acute left gastrocnemius vein thrombosis approximately 1.1 cm from the popliteal vein.  -Patient is on therapeutic Lovenox and has an IVC filter placed 8/5 via IR    # DLD/ DM 2/ HTN  - Continue atenolol    #Constipation  -Started on Miralax 17 gm    # Dual chamber pacemaker  - Reason for implant uncertain  - ECG sinus rhythm, not requiring pace backup at time of admission    # Diet > DASH/TLC  # DVT  On Lovenox therapeutic

## 2020-08-21 NOTE — PROGRESS NOTE ADULT - ASSESSMENT
· Assessment		  80y M HTN, HLD, CAD?, conduction abnormality sp Dual chamber pacemaker admitted with COVID, tested + as an outpatient about 1 week prior.       IMPRESSION  # COVID-19 PNA with sepsis on admission which has resolved    LE Duplex: + DVTs s/p IVC filter 8/6    Procalcitonin, Serum: 0.16 (08-04-20 @ 21:00)- not consistent with bacterial infection    CXR bilateral airspace opacities with no changes 8/21    WBC 17.9>11.8>20.9 ( secondary to steroids )  #Cytokine Release Syndrome   D-Dimer Assay, Quantitative: 3614 ng/mL DDU (08-06-20 @ 08:13)  #Transaminitis   #Lactic acidosis    RECOMMENDATIONS  - afebrile, CXR improving, Procalcitonin, Serum: 0.04 (08-13-20 @ 08:16)- not consistent with bacterial infection  - s/p Remdesivir 100mg daily x 10 days   - Decadron 6mg daily IV x 10 days in all  - s/p 8/5 Tocilizumab 400mg x1  - s/p 8/5 2 units convalescent plasma  - Therapeutic A/C    Please call with any questions or send a message on Microsoft Teams  Spectra [email or web address]

## 2020-08-21 NOTE — PROGRESS NOTE ADULT - ASSESSMENT
IMPRESSION:    Acute Hypoxemic Respiratory Failure   Sepsis present on admission   COVID-19 PNA  HO DVT was on Eliquis.   Acute DVT while on AC SP GFF    PLAN:    CNS: Avoid CNS depressants.      HEENT: Oral care    PULMONARY: keep sat >88%.   HOB @ 45 degrees. Aspiration Precautions. Continue Weaning O2 as tolerated     CARDIOVASCULAR: Avoid volume overload .     GI: GI prophylaxis.  Feeding as tolerated      RENAL:  Follow up lytes.  Correct as needed.    INFECTIOUS DISEASE:  per ID,  FU inflammatory markers     HEMATOLOGICAL:  therapeutic Lovenox q12,      ENDOCRINE:  Follow up FS.  Insulin protocol if needed.    MUSCULOSKELETAL: Bedrest    Continue monitoring in step down unit

## 2020-08-22 NOTE — PROGRESS NOTE ADULT - REASON FOR ADMISSION
COVID LRTI
COVID PNA
COVID pna
COVID LRTI

## 2020-08-22 NOTE — AIRWAY PLACEMENT NOTE ADULT - POST AIRWAY PLACEMENT ASSESSMENT:
breath sounds equal/chest excursion noted/breath sounds bilateral/positive end tidal CO2 noted/CXR pending

## 2020-08-22 NOTE — CHART NOTE - NSCHARTNOTEFT_GEN_A_CORE
Patient's son, Sandrita Shaw, was contacted in regards to the patient's worsening clinical status.  I informed him that patient's SpO2 levels were decreasing despite being on high flow nasal canula.  When discussing possibility of intubation due to patient's worsening respiratory status, patient's son adamantly did not want his father to be intubated, and his father would've not wanted to be intubated.  Will fill out MOLST form today verbally with a witness. Patient's son, Sandrita Shaw, was contacted in regards to the patient's worsening clinical status.  I informed him that patient's SpO2 levels were decreasing despite being on high flow nasal canula.  When discussing possibility of intubation due to patient's worsening respiratory status, patient's son adamantly did not want his father to be intubated, and his father would've not wanted to be intubated, but son did express desire for trial of CPR in case of cardiac arrest. 80 year old M w/PMHx of 81 y/o M HTN, DM, DLD, CAD? , conduction abnormality s/p dual chamber pacemaker, hx of DVT on eliquis patient, presented with AHRF 2ry to COVID pna, hospital course complicated by acute dvt.  Patient was shown to be COVID -19 PCR positive on admission and was admitted with AHRF 2/2 COVID PNA.  Rapid response was called at around 9:00AM for desaturation to the 50s and was increasingly tachypneic despite being on the BiPAP.  Patient's son HCP, Sandrita Valentine,  was immediately called and arrived at the hospital for GOC discussion and kary's son wanted full medical intervention. Femoral CVC was immediately placed and patient was started on 1mcg/kg/min levophed. Patient was subsequently intubated for airway protection.  Patient was started on broad spectrum Abx (vancomycin and meropenem), casopfungin also started for anti-fungal activity. \    81 y/o M HTN, DM, DLD, CAD? , conduction abnormality s/p dual chamber pacemaker, hx of DVT on eliquis patient, presented with AHRF 2ry to COVID pna, hospital course complicated by acute dvt       To f/u  CBC  CMP  Mag  Phos  PT/INR/PTT  Lactate         #Acute hypoxic respiratory failure 2/2 to COVID  Patient s/p intubated 8/22 for airway protection as he was tachypneic and was desaturating on BiPAP  Meropenem, Vancomycin and Caspofungin started  f/u BCx and Fungitell at 4pm   CXR 8/22 unchanged; no new opacities, consilidations   f/u STAT CBC, CMP, Mag, Lactate   -On admission: CRP 19.62, D-dimer 82864, procal 0.16, ferritin 1570,   -Repeat 8/10-8/11 CRP 0.93 D-dimer 1132 procal 0.06 ferritin 1625,   -CXR 8/4: Patchy bilateral airspace opacities  -CXR 8/16 unchanged  -CXR 8/18: Low lung volume  No significant interval change. Stable, patchy bilateral lower lung opacities.  -s/p 8/5 2 units convalescent plasma, s/p toci  -S/p Tocilizumab 400mg 8/5  -S/p Remdesivir 200mg x1   -s/p Remdesivir 100mg qd x 10 days (Last day 8/14)  -s/p Dexamethasone 6mg IV qd x10 days (Last day 8/16)  -Monitor liver enzymes, daily CMP cutoff >205  -ID following  -Pulm recs noted  -c/w lovenox 40 q12  -Blood cultures NGTD  -s/p meropenem   -procalc 08/13 and 08/14= .03  - Solumedrol 60 mg q 12 hrs  - Started patient on bactrim for PCP PPX  - Current flow rate 50; on 50% saturating 94%; attempt to wean off o2    #Hx of DVt was on Eliquis with Acute DVT while on AC SP GFF 8/5  -c/w therapeutic Lovenox for now  - f/u with repeat duplex ultrasound  - < from: [facility] Duplex Lower Ext Vein Scan, Bilat (08.21.20 @ 11:31) >  Acute right common femoral and femoral vein DVT. The previously noted popliteal vein DVT is not visualized.  Acute left posterior tibial vein DVT. Acute left gastrocnemius vein thrombosis approximately 1.1 cm from the popliteal vein.  -Patient is on therapeutic Lovenox and has an IVC filter placed 8/5 via IR    # DLD/ DM 2/ HTN  - Continue atenolol    #Constipation  -Started on Miralax 17 gm    # Dual chamber pacemaker  - Reason for implant uncertain  - ECG sinus rhythm, not requiring pace backup at time of admission    # Diet > DASH/TLC  # DVT  On Lovenox therapeutic

## 2020-08-22 NOTE — PROGRESS NOTE ADULT - PROVIDER SPECIALTY LIST ADULT
Critical Care
Infectious Disease
Internal Medicine
Intervent Radiology
MICU
Pulmonology
Critical Care
Internal Medicine
Critical Care
Infectious Disease

## 2020-08-22 NOTE — PROGRESS NOTE ADULT - ASSESSMENT
IMPRESSION:    Acute Hypoxemic Respiratory Failure   Sepsis present on admission   COVID-19 PNA  HO DVT was on Eliquis.   Acute DVT while on AC SP GFF    PLAN:    CNS: Avoid CNS depressants.  Increase Klonopin to BID     HEENT: Oral care    PULMONARY: keep sat >88%.   HOB @ 45 degrees. Aspiration Precautions. Continue Weaning O2 as tolerated     CARDIOVASCULAR: Avoid volume overload .     GI: GI prophylaxis.  Feeding as tolerated      RENAL:  Follow up lytes.  Correct as needed.  Repeat Lytes     INFECTIOUS DISEASE:  per ID,  Repeat CBC     HEMATOLOGICAL:  therapeutic Lovenox q12,      ENDOCRINE:  Follow up FS.  Insulin protocol if needed.    MUSCULOSKELETAL: Bedrest    Continue monitoring in step down unit IMPRESSION:    Acute Hypoxemic Respiratory Failure   Sepsis present on admission   COVID-19 PNA  HO DVT was on Eliquis.   Acute DVT while on AC SP GFF    PLAN:    CNS: Avoid CNS depressants.  Increase Klonopin to BID     HEENT: Oral care    PULMONARY: keep sat >88%.   HOB @ 45 degrees. Aspiration Precautions. Continue Weaning O2 as tolerated     CARDIOVASCULAR: Avoid volume overload .     GI: GI prophylaxis.  Feeding as tolerated      RENAL:  Follow up lytes.  Correct as needed.  Repeat Lytes     INFECTIOUS DISEASE:  per ID,  Repeat CBC     HEMATOLOGICAL:  therapeutic Lovenox q12,      ENDOCRINE:  Follow up FS.  Insulin protocol if needed.    MUSCULOSKELETAL: Bedrest    Continue monitoring in step down unit     Addendum:    Rapid response called. Patient seen and examined.  Case DW Son at great length  Needs to repeat CXR stat, ECHO STAT.  Intubation and MV.  A line.  DTA and start Broad spectrum ABX and Anti fungal.  Repeat cultures and CE.  EP / Cardiology to adjust his PPM for Rate of > 80   Prognosis extremely poor    mg Q8   TO transfer to ICU once stabilized

## 2020-08-22 NOTE — RAPID RESPONSE TEAM SUMMARY - NSSITUATIONBACKGROUNDRRT_GEN_ALL_CORE
Nurse found patient in respiratory distress with an oxygen saturation around 89% and called a rapid response.   Patient is a 81 y/o male with pmh of HTN, conduction abnormality s/p dual chamber pacemaker and DMII here for COVID on high flow NC.  Patient was tachypneic at first, but improved  with no intervention. His saturation went up to 99% and he looked comfortable.

## 2020-08-22 NOTE — PROGRESS NOTE ADULT - SUBJECTIVE AND OBJECTIVE BOX
Patient is a 80y old  Male who presents with a chief complaint of COVID LRTI (21 Aug 2020 16:19)        Over Night Events:  Anxious.  NO SOB at rest.,  NO CP         ROS:     All ROS are negative except HPI         PHYSICAL EXAM    ICU Vital Signs Last 24 Hrs  T(C): 36.4 (22 Aug 2020 01:50), Max: 36.4 (22 Aug 2020 01:50)  T(F): 97.6 (22 Aug 2020 01:50), Max: 97.6 (22 Aug 2020 01:50)  HR: 100 (22 Aug 2020 01:50) (62 - 100)  BP: 98/63 (22 Aug 2020 01:50) (90/60 - 98/63)  BP(mean): 68 (22 Aug 2020 01:50) (65 - 68)  ABP: --  ABP(mean): --  RR: 18 (22 Aug 2020 01:50) (18 - 18)  SpO2: 100% (22 Aug 2020 06:17) (97% - 100%)      CONSTITUTIONAL:  Well nourished.  NAD    ENT:   Airway patent,   Mouth with normal mucosa.   No thrush    EYES:   Pupils equal,   Round and reactive to light.    CARDIAC:   Normal rate,   Regular rhythm.    No edema      Vascular:  Normal systolic impulse  No Carotid bruits    RESPIRATORY:   No wheezing  Bilateral BS  Normal chest expansion  Not tachypneic,  No use of accessory muscles    GASTROINTESTINAL:  Abdomen soft,   Non-tender,   No guarding,   + BS    MUSCULOSKELETAL:   Range of motion is not limited,  No clubbing, cyanosis    NEUROLOGICAL:   Alert and oriented   No motor  deficits.    SKIN:   Skin normal color for race,   Warm and dry and intact.   No evidence of rash.    PSYCHIATRIC:   Normal mood and affect.   No apparent risk to self or others.    HEMATOLOGICAL:  No cervical  lymphadenopathy.  no inguinal lymphadenopathy      08-21-20 @ 07:01  -  08-22-20 @ 07:00  --------------------------------------------------------  IN:  Total IN: 0 mL    OUT:    Voided: 1100 mL  Total OUT: 1100 mL    Total NET: -1100 mL          LABS:                            11.6   20.99 )-----------( 187      ( 21 Aug 2020 06:09 )             34.2                                                     08-21    129<L>  |  94<L>  |  18  ----------------------------<  154<H>  5.0   |  23  |  0.9    Ca    8.5      21 Aug 2020 06:09  Mg     2.2     08-21    TPro  5.5<L>  /  Alb  3.2<L>  /  TBili  0.9  /  DBili  x   /  AST  19  /  ALT  18  /  AlkPhos  60  08-21                                                                                           LIVER FUNCTIONS - ( 21 Aug 2020 06:09 )  Alb: 3.2 g/dL / Pro: 5.5 g/dL / ALK PHOS: 60 U/L / ALT: 18 U/L / AST: 19 U/L / GGT: x                                                                                                                                   ABG - ( 22 Aug 2020 01:39 )  pH, Arterial: 7.41  pH, Blood: x     /  pCO2: 26    /  pO2: 153   / HCO3: 17    / Base Excess: ?-6.8 /  SaO2: 100                 MEDICATIONS  (STANDING):  ATENolol  Tablet 25 milliGRAM(s) Oral daily  clonazePAM  Tablet 0.5 milliGRAM(s) Oral once  dextrose 5%. 1000 milliLiter(s) (50 mL/Hr) IV Continuous <Continuous>  dextrose 50% Injectable 12.5 Gram(s) IV Push once  dextrose 50% Injectable 25 Gram(s) IV Push once  dextrose 50% Injectable 25 Gram(s) IV Push once  enoxaparin Injectable 70 milliGRAM(s) SubCutaneous every 12 hours  guaifenesin/dextromethorphan  Syrup 5 milliLiter(s) Oral every 6 hours  insulin lispro (HumaLOG) corrective regimen sliding scale   SubCutaneous three times a day before meals  pantoprazole    Tablet 40 milliGRAM(s) Oral before breakfast  polyethylene glycol 3350 17 Gram(s) Oral daily    MEDICATIONS  (PRN):  ALBUTerol    90 MICROgram(s) HFA Inhaler 2 Puff(s) Inhalation every 6 hours PRN Shortness of Breath and/or Wheezing  dextrose 40% Gel 15 Gram(s) Oral once PRN Blood Glucose LESS THAN 70 milliGRAM(s)/deciliter  glucagon  Injectable 1 milliGRAM(s) IntraMuscular once PRN Glucose LESS THAN 70 milligrams/deciliter      New X-rays reviewed:                                                                                  ECHO    CXR interpreted by me:    Improving infiltrates

## 2020-08-22 NOTE — PROGRESS NOTE ADULT - NSHPATTENDINGPLANDISCUSS_GEN_ALL_CORE
CEU Team
TEAM
Medical resident and nursing staff and patient
team

## 2020-08-22 NOTE — CHART NOTE - NSCHARTNOTEFT_GEN_A_CORE
Patient was placed on Bipap for oxygenation but started having non bloody non bilious emesis and bipap had to be removed for airway protection. He is currently placed on High Flow nasal cannula, mental status is at baseline. Patient is agreeable for elective intubation should the need arise if patient is unable to maintain his oxygen saturation on high flow nasal cannula.

## 2020-08-22 NOTE — RAPID RESPONSE TEAM SUMMARY - NSOTHERINTERVENTIONSRRT_GEN_ALL_CORE
Respiratory distress 2/2 possible new infection  - f/u AM lactate, procal and Bcx  - f/u CXR  - Trial of BIPAP if patient becomes tachypneic again  - continue high flow NC for now

## 2020-08-22 NOTE — DISCHARGE NOTE FOR THE EXPIRED PATIENT - HOSPITAL COURSE
This is a 80 year old male with a PMHx of ?CAD, Bradycardia s/p PPM, DVT (Eliquis), DM, DLD, & HTN who was admitted with a working diagnosis of acute hypoxemic respiratory failure secondary to COVID-19 Pneumonia; course was complicated with acute DVT. On the morning of 8/22, the patient was found pulseless and unresponsive, and CODE BLUE was initiated with ROSC achieved. The patient was upgrade to ICU management; however, around 2PM the patient was again found pulseless and unresponsive by the caring RN. Again, CODE BLUE was initiated with ROSC achieved after 3 EPII; Rhythm was PEA throughout the code. Unfortunately the patient became pulseless almost immediately after ROSC was achieved and the patient could not be resuscitated; the rhythm was asystole. TOD 2:46PM called by Dr. Tej Bridges ([facility] senior resident).

## 2020-08-22 NOTE — PROCEDURE NOTE - NSPROCDETAILS_GEN_ALL_CORE
sterile dressing applied/lumen(s) aspirated and flushed/guidewire recovered/sterile technique, catheter placed

## 2020-08-27 LAB
CULTURE RESULTS: SIGNIFICANT CHANGE UP
SPECIMEN SOURCE: SIGNIFICANT CHANGE UP

## 2020-08-31 DIAGNOSIS — E87.2 ACIDOSIS: ICD-10-CM

## 2020-08-31 DIAGNOSIS — U07.1 COVID-19: ICD-10-CM

## 2020-08-31 DIAGNOSIS — I10 ESSENTIAL (PRIMARY) HYPERTENSION: ICD-10-CM

## 2020-08-31 DIAGNOSIS — Z95.0 PRESENCE OF CARDIAC PACEMAKER: ICD-10-CM

## 2020-08-31 DIAGNOSIS — K59.00 CONSTIPATION, UNSPECIFIED: ICD-10-CM

## 2020-08-31 DIAGNOSIS — J12.89 OTHER VIRAL PNEUMONIA: ICD-10-CM

## 2020-08-31 DIAGNOSIS — I46.9 CARDIAC ARREST, CAUSE UNSPECIFIED: ICD-10-CM

## 2020-08-31 DIAGNOSIS — Z79.899 OTHER LONG TERM (CURRENT) DRUG THERAPY: ICD-10-CM

## 2020-08-31 DIAGNOSIS — A41.89 OTHER SPECIFIED SEPSIS: ICD-10-CM

## 2020-08-31 DIAGNOSIS — I82.462 ACUTE EMBOLISM AND THROMBOSIS OF LEFT CALF MUSCULAR VEIN: ICD-10-CM

## 2020-08-31 DIAGNOSIS — Z00.6 ENCOUNTER FOR EXAMINATION FOR NORMAL COMPARISON AND CONTROL IN CLINICAL RESEARCH PROGRAM: ICD-10-CM

## 2020-08-31 DIAGNOSIS — I82.431 ACUTE EMBOLISM AND THROMBOSIS OF RIGHT POPLITEAL VEIN: ICD-10-CM

## 2020-08-31 DIAGNOSIS — I82.442 ACUTE EMBOLISM AND THROMBOSIS OF LEFT TIBIAL VEIN: ICD-10-CM

## 2020-08-31 DIAGNOSIS — J96.01 ACUTE RESPIRATORY FAILURE WITH HYPOXIA: ICD-10-CM

## 2020-08-31 DIAGNOSIS — E11.9 TYPE 2 DIABETES MELLITUS WITHOUT COMPLICATIONS: ICD-10-CM
